# Patient Record
Sex: MALE | Race: WHITE | NOT HISPANIC OR LATINO
[De-identification: names, ages, dates, MRNs, and addresses within clinical notes are randomized per-mention and may not be internally consistent; named-entity substitution may affect disease eponyms.]

---

## 2017-02-23 ENCOUNTER — APPOINTMENT (OUTPATIENT)
Dept: CARDIOLOGY | Facility: CLINIC | Age: 72
End: 2017-02-23

## 2017-02-23 VITALS — BODY MASS INDEX: 28.96 KG/M2 | WEIGHT: 174 LBS

## 2017-02-27 ENCOUNTER — MEDICATION RENEWAL (OUTPATIENT)
Age: 72
End: 2017-02-27

## 2017-03-03 ENCOUNTER — OUTPATIENT (OUTPATIENT)
Dept: OUTPATIENT SERVICES | Facility: HOSPITAL | Age: 72
LOS: 1 days | Discharge: HOME | End: 2017-03-03

## 2017-03-13 ENCOUNTER — MEDICATION RENEWAL (OUTPATIENT)
Age: 72
End: 2017-03-13

## 2017-04-03 ENCOUNTER — APPOINTMENT (OUTPATIENT)
Dept: CARDIOLOGY | Facility: CLINIC | Age: 72
End: 2017-04-03

## 2017-04-03 VITALS — HEIGHT: 65 IN | WEIGHT: 174 LBS | BODY MASS INDEX: 28.99 KG/M2

## 2017-04-03 VITALS — DIASTOLIC BLOOD PRESSURE: 80 MMHG | SYSTOLIC BLOOD PRESSURE: 130 MMHG | HEART RATE: 80 BPM

## 2017-05-01 ENCOUNTER — APPOINTMENT (OUTPATIENT)
Dept: CARDIOLOGY | Facility: CLINIC | Age: 72
End: 2017-05-01

## 2017-06-07 ENCOUNTER — APPOINTMENT (OUTPATIENT)
Dept: OTOLARYNGOLOGY | Facility: CLINIC | Age: 72
End: 2017-06-07

## 2017-06-07 ENCOUNTER — RX RENEWAL (OUTPATIENT)
Age: 72
End: 2017-06-07

## 2017-06-07 VITALS — WEIGHT: 174 LBS | HEIGHT: 65 IN | BODY MASS INDEX: 28.99 KG/M2

## 2017-06-27 DIAGNOSIS — A88.1 EPIDEMIC VERTIGO: ICD-10-CM

## 2017-07-12 ENCOUNTER — APPOINTMENT (OUTPATIENT)
Dept: OTOLARYNGOLOGY | Facility: CLINIC | Age: 72
End: 2017-07-12

## 2017-07-27 ENCOUNTER — APPOINTMENT (OUTPATIENT)
Dept: CARDIOLOGY | Facility: CLINIC | Age: 72
End: 2017-07-27

## 2017-07-27 VITALS — SYSTOLIC BLOOD PRESSURE: 124 MMHG | DIASTOLIC BLOOD PRESSURE: 78 MMHG

## 2017-07-27 VITALS — BODY MASS INDEX: 30.79 KG/M2 | WEIGHT: 185 LBS

## 2017-07-27 RX ORDER — ASPIRIN 81 MG/1
81 TABLET ORAL
Qty: 30 | Refills: 6 | Status: DISCONTINUED | COMMUNITY
Start: 2017-04-03 | End: 2017-07-27

## 2017-07-28 ENCOUNTER — APPOINTMENT (OUTPATIENT)
Dept: OTOLARYNGOLOGY | Facility: CLINIC | Age: 72
End: 2017-07-28
Payer: MEDICARE

## 2017-07-28 VITALS — HEIGHT: 65 IN | BODY MASS INDEX: 30.82 KG/M2 | WEIGHT: 185 LBS

## 2017-07-28 PROCEDURE — 99213 OFFICE O/P EST LOW 20 MIN: CPT

## 2017-08-18 ENCOUNTER — APPOINTMENT (OUTPATIENT)
Dept: OTOLARYNGOLOGY | Facility: CLINIC | Age: 72
End: 2017-08-18
Payer: MEDICARE

## 2017-08-18 VITALS — WEIGHT: 185 LBS | BODY MASS INDEX: 30.82 KG/M2 | HEIGHT: 65 IN

## 2017-08-18 PROCEDURE — 69433 CREATE EARDRUM OPENING: CPT

## 2017-08-18 PROCEDURE — 99213 OFFICE O/P EST LOW 20 MIN: CPT | Mod: 25

## 2017-08-24 ENCOUNTER — APPOINTMENT (OUTPATIENT)
Dept: CARDIOLOGY | Facility: CLINIC | Age: 72
End: 2017-08-24

## 2017-08-24 VITALS — DIASTOLIC BLOOD PRESSURE: 80 MMHG | HEART RATE: 67 BPM | SYSTOLIC BLOOD PRESSURE: 130 MMHG

## 2017-08-24 VITALS — HEIGHT: 65 IN | WEIGHT: 184 LBS | BODY MASS INDEX: 30.66 KG/M2

## 2017-09-22 ENCOUNTER — APPOINTMENT (OUTPATIENT)
Dept: OTOLARYNGOLOGY | Facility: CLINIC | Age: 72
End: 2017-09-22
Payer: MEDICARE

## 2017-09-22 ENCOUNTER — RX RENEWAL (OUTPATIENT)
Age: 72
End: 2017-09-22

## 2017-09-22 VITALS — BODY MASS INDEX: 30.66 KG/M2 | HEIGHT: 65 IN | WEIGHT: 184 LBS

## 2017-09-22 PROCEDURE — 99213 OFFICE O/P EST LOW 20 MIN: CPT

## 2017-10-20 ENCOUNTER — OUTPATIENT (OUTPATIENT)
Dept: OUTPATIENT SERVICES | Facility: HOSPITAL | Age: 72
LOS: 1 days | Discharge: HOME | End: 2017-10-20

## 2017-10-20 DIAGNOSIS — H81.13 BENIGN PAROXYSMAL VERTIGO, BILATERAL: ICD-10-CM

## 2017-12-07 ENCOUNTER — APPOINTMENT (OUTPATIENT)
Dept: CARDIOLOGY | Facility: CLINIC | Age: 72
End: 2017-12-07

## 2017-12-07 VITALS — BODY MASS INDEX: 31.12 KG/M2 | WEIGHT: 187 LBS

## 2017-12-07 VITALS — DIASTOLIC BLOOD PRESSURE: 62 MMHG | SYSTOLIC BLOOD PRESSURE: 140 MMHG

## 2017-12-07 RX ORDER — GLIPIZIDE 10 MG/1
10 TABLET, FILM COATED, EXTENDED RELEASE ORAL
Refills: 0 | Status: DISCONTINUED | COMMUNITY
End: 2017-12-07

## 2017-12-18 ENCOUNTER — APPOINTMENT (OUTPATIENT)
Dept: OTOLARYNGOLOGY | Facility: CLINIC | Age: 72
End: 2017-12-18
Payer: MEDICARE

## 2017-12-18 VITALS — BODY MASS INDEX: 31.16 KG/M2 | HEIGHT: 65 IN | WEIGHT: 187 LBS

## 2017-12-18 PROCEDURE — 99213 OFFICE O/P EST LOW 20 MIN: CPT | Mod: 25

## 2017-12-18 PROCEDURE — 69210 REMOVE IMPACTED EAR WAX UNI: CPT

## 2018-01-16 ENCOUNTER — APPOINTMENT (OUTPATIENT)
Dept: CARDIOLOGY | Facility: CLINIC | Age: 73
End: 2018-01-16

## 2018-01-16 VITALS — BODY MASS INDEX: 31.32 KG/M2 | HEIGHT: 65 IN | WEIGHT: 188 LBS

## 2018-01-16 VITALS — HEART RATE: 82 BPM | SYSTOLIC BLOOD PRESSURE: 130 MMHG | DIASTOLIC BLOOD PRESSURE: 80 MMHG

## 2018-01-16 DIAGNOSIS — H65.92 UNSPECIFIED NONSUPPURATIVE OTITIS MEDIA, LEFT EAR: ICD-10-CM

## 2018-03-12 ENCOUNTER — RX RENEWAL (OUTPATIENT)
Age: 73
End: 2018-03-12

## 2018-04-12 ENCOUNTER — APPOINTMENT (OUTPATIENT)
Dept: CARDIOLOGY | Facility: CLINIC | Age: 73
End: 2018-04-12

## 2018-04-12 VITALS — SYSTOLIC BLOOD PRESSURE: 120 MMHG | DIASTOLIC BLOOD PRESSURE: 70 MMHG | BODY MASS INDEX: 31.62 KG/M2 | WEIGHT: 190 LBS

## 2018-04-12 RX ORDER — PIOGLITAZONE HYDROCHLORIDE 45 MG/1
45 TABLET ORAL
Refills: 0 | Status: COMPLETED | COMMUNITY
Start: 2017-09-11 | End: 2018-04-12

## 2018-04-18 ENCOUNTER — APPOINTMENT (OUTPATIENT)
Dept: OTOLARYNGOLOGY | Facility: CLINIC | Age: 73
End: 2018-04-18
Payer: MEDICARE

## 2018-04-18 VITALS
HEIGHT: 65 IN | WEIGHT: 193 LBS | BODY MASS INDEX: 32.15 KG/M2 | DIASTOLIC BLOOD PRESSURE: 78 MMHG | SYSTOLIC BLOOD PRESSURE: 122 MMHG

## 2018-04-18 DIAGNOSIS — Z87.09 PERSONAL HISTORY OF OTHER DISEASES OF THE RESPIRATORY SYSTEM: ICD-10-CM

## 2018-04-18 PROCEDURE — 31575 DIAGNOSTIC LARYNGOSCOPY: CPT

## 2018-04-18 PROCEDURE — 99213 OFFICE O/P EST LOW 20 MIN: CPT | Mod: 25

## 2018-05-15 ENCOUNTER — APPOINTMENT (OUTPATIENT)
Dept: VASCULAR SURGERY | Facility: CLINIC | Age: 73
End: 2018-05-15
Payer: MEDICARE

## 2018-05-15 VITALS
DIASTOLIC BLOOD PRESSURE: 70 MMHG | HEIGHT: 65 IN | BODY MASS INDEX: 31.99 KG/M2 | WEIGHT: 192 LBS | SYSTOLIC BLOOD PRESSURE: 122 MMHG

## 2018-05-15 PROCEDURE — 99213 OFFICE O/P EST LOW 20 MIN: CPT

## 2018-05-15 PROCEDURE — 93970 EXTREMITY STUDY: CPT

## 2018-05-21 ENCOUNTER — MEDICATION RENEWAL (OUTPATIENT)
Age: 73
End: 2018-05-21

## 2018-05-22 ENCOUNTER — RX RENEWAL (OUTPATIENT)
Age: 73
End: 2018-05-22

## 2018-06-18 ENCOUNTER — APPOINTMENT (OUTPATIENT)
Dept: CARDIOLOGY | Facility: CLINIC | Age: 73
End: 2018-06-18

## 2018-06-18 VITALS
WEIGHT: 199 LBS | DIASTOLIC BLOOD PRESSURE: 70 MMHG | BODY MASS INDEX: 33.15 KG/M2 | HEART RATE: 81 BPM | SYSTOLIC BLOOD PRESSURE: 130 MMHG | HEIGHT: 65 IN

## 2018-07-02 ENCOUNTER — APPOINTMENT (OUTPATIENT)
Dept: CARDIOLOGY | Facility: CLINIC | Age: 73
End: 2018-07-02

## 2018-07-11 ENCOUNTER — OUTPATIENT (OUTPATIENT)
Dept: OUTPATIENT SERVICES | Facility: HOSPITAL | Age: 73
LOS: 1 days | Discharge: HOME | End: 2018-07-11

## 2018-07-11 DIAGNOSIS — I25.10 ATHEROSCLEROTIC HEART DISEASE OF NATIVE CORONARY ARTERY WITHOUT ANGINA PECTORIS: ICD-10-CM

## 2018-10-18 ENCOUNTER — APPOINTMENT (OUTPATIENT)
Dept: CARDIOLOGY | Facility: CLINIC | Age: 73
End: 2018-10-18

## 2018-10-18 VITALS — DIASTOLIC BLOOD PRESSURE: 72 MMHG | SYSTOLIC BLOOD PRESSURE: 132 MMHG

## 2018-10-18 VITALS — BODY MASS INDEX: 33.12 KG/M2 | WEIGHT: 199 LBS

## 2018-10-26 ENCOUNTER — APPOINTMENT (OUTPATIENT)
Dept: CARDIOLOGY | Facility: CLINIC | Age: 73
End: 2018-10-26

## 2018-10-26 VITALS
SYSTOLIC BLOOD PRESSURE: 130 MMHG | DIASTOLIC BLOOD PRESSURE: 76 MMHG | HEART RATE: 80 BPM | BODY MASS INDEX: 32.99 KG/M2 | WEIGHT: 198 LBS | HEIGHT: 65 IN

## 2018-11-30 ENCOUNTER — RX RENEWAL (OUTPATIENT)
Age: 73
End: 2018-11-30

## 2018-12-10 ENCOUNTER — OUTPATIENT (OUTPATIENT)
Dept: OUTPATIENT SERVICES | Facility: HOSPITAL | Age: 73
LOS: 1 days | Discharge: HOME | End: 2018-12-10

## 2018-12-10 DIAGNOSIS — R55 SYNCOPE AND COLLAPSE: ICD-10-CM

## 2018-12-10 LAB — GLUCOSE BLDC GLUCOMTR-MCNC: 138 MG/DL — HIGH (ref 70–99)

## 2018-12-10 NOTE — H&P ADULT - NSHPPHYSICALEXAM_GEN_ALL_CORE
Constitutional: well developed, normal appearance, well groomed, well nourished, no deformities and no acute distress.   Cardiovascular: heart rate and rhythm were normal, normal S1 and S2 and no murmurs present.   Pulmonary: no respiratory distress, normal respiratory rhythm and effort, no accessory muscle use and lungs were clear to auscultation bilaterally.   Skin: The incision was clean, dry and well-healed.   Abdomen: soft, non-tender, no hepato-splenomegaly and no abdominal mass palpated.   Extremities: no clubbing of the fingernails, no localized cyanosis, no petechial hemorrhages and no ischemic changes.

## 2018-12-10 NOTE — H&P ADULT - HISTORY OF PRESENT ILLNESS
Pti s a 74 yo M with hx of DM, 1st degree AV block, HLD, HTN, with loop recorder placed 2 years ago coming to EP lab to have loop recorder removed. Pt has hx of palpitations and dizziness but has not had any episodes in several months. Pt at this time feeling well and asymptomatic. Denies dizziness, syncope, chest pain, leg swelling, fever.

## 2018-12-10 NOTE — H&P ADULT - ATTENDING COMMENTS
Patient with loop recorder for syncope. No events noted. Here for loop explant.    keflex 500 mg po x 1 dose prior to procedure

## 2018-12-10 NOTE — PROGRESS NOTE ADULT - SUBJECTIVE AND OBJECTIVE BOX
Electrophysiology Brief Post-Op Note    I have personally seen and examined the patient.  I agree with the history and physical which I have reviewed and noted any changes below.  12-10-18 @ 08:30 AM    PRE-OP DIAGNOSIS: Syncope    POST-OP DIAGNOSIS: Syncope    PROCEDURE: Implantable Loop Recorder Extraction    Physician: Jaja Childers MD  Assistant: None    ESTIMATED BLOOD LOSS:     5  mL    ANESTHESIA TYPE:  [  ]General Anesthesia  [  ] Sedation  [ X ] Local/Regional    CONDITION  [  ] Critical  [  ] Serious  [  ]Fair  [ X ]Good      SPECIMENS REMOVED (IF APPLICABLE): None    IMPLANTS (IF APPLICABLE): Implantable Loop Recorder ([employer])      FINDINGS  PLAN OF CARE  - Keflex 500 mg PO x 1 dose prior to procedure  - Remove outer dressing tomorrow  - No shower x 2 days. May shower on Thurs.   - Follow up with Dr. Mcelroy in the office in 4 weeks      Jaja Childers MD   Electrophysiology Attending

## 2018-12-10 NOTE — CHART NOTE - NSCHARTNOTEFT_GEN_A_CORE
Cardiac Electrophysiology Procedure Report  Date of procedure	12/10/18  EP Attending	Jaja Childers MD  Procedure	Loop Recorder Removal    Indication: 72 yo M with loop recorder in place for syncope. He is now referred for removal of implantable loop recorder.    Anesthesia: Local    EQUIPMENT EXPLANTED  :     [employer]   Model Number:  [id number]  Serial Number:    [id number]    DESCRIPTION OF PROCEDURE  The patient was brought to the electrophysiology procedure area in a nonsedated state, and received preoperative antibiotics. Informed, written consent was obtained prior to the procedure.  The device was interrogated and no events were noted on the loop recorder. The left anterior chest region was prepped and cleaned from the nipple to the upper left clavicular border with serial applications of Chlorhexidine. Patient was then covered with sterile drapes in the usual manner. Blood pressure, oxygenation, and level of comfort were stable throughout.     Following infiltration with local anesthetic, a 1-cm incision was made along the left sternal border at the fourth intercostal space over the previous scar. The implantable loop recorder was removed from the subcutaneous tissue. The wound was approximated with a 4-0 absorbable suture. Direct pressure was applied to the wound to obtain hemostasis. The wound was approximated using dermabond. Steristrips and a dry, sterile dressing were placed over the wound.     The patient tolerated the procedure well.    COMPLICATIONS  There were no immediate complications.    CONCLUSIONS  Successful removal of a loop recorder.    EBL: 5 cc  _______________________________  Jaja Childers MD  Cardiac Electrophysiology

## 2018-12-14 DIAGNOSIS — Z45.09 ENCOUNTER FOR ADJUSTMENT AND MANAGEMENT OF OTHER CARDIAC DEVICE: ICD-10-CM

## 2019-01-10 ENCOUNTER — APPOINTMENT (OUTPATIENT)
Dept: CARDIOLOGY | Facility: CLINIC | Age: 74
End: 2019-01-10

## 2019-01-10 VITALS — BODY MASS INDEX: 33.28 KG/M2 | DIASTOLIC BLOOD PRESSURE: 70 MMHG | SYSTOLIC BLOOD PRESSURE: 138 MMHG | WEIGHT: 200 LBS

## 2019-01-10 NOTE — ASSESSMENT
[FreeTextEntry1] : dizziness - likely not related to rhythm. It is most likely multifactorial anmeia, debates\par No significant arrhythmic cause for patient dizziness. \par - symptoms improved  continue meds\par -  removing loop. risk and benfits expaliend

## 2019-01-10 NOTE — HISTORY OF PRESENT ILLNESS
[Syncope] : no syncope [Dizziness] : no dizziness [Erythema at Site] : no erythema at device site [Swelling at Site] : no swelling at device site [de-identified] : Patient c/o no further episodes after seeing ENT.  Loop had no episodes

## 2019-01-10 NOTE — PHYSICAL EXAM
[General Appearance - Well Developed] : well developed [Normal Appearance] : normal appearance [Well Groomed] : well groomed [General Appearance - Well Nourished] : well nourished [No Deformities] : no deformities [General Appearance - In No Acute Distress] : no acute distress [Respiration, Rhythm And Depth] : normal respiratory rhythm and effort [Exaggerated Use Of Accessory Muscles For Inspiration] : no accessory muscle use [Auscultation Breath Sounds / Voice Sounds] : lungs were clear to auscultation bilaterally [Heart Rate And Rhythm] : heart rate and rhythm were normal [Heart Sounds] : normal S1 and S2 [Murmurs] : no murmurs present [Abdomen Soft] : soft [Abdomen Tenderness] : non-tender [Abdomen Mass (___ Cm)] : no abdominal mass palpated [Nail Clubbing] : no clubbing of the fingernails [Cyanosis, Localized] : no localized cyanosis [Petechial Hemorrhages (___cm)] : no petechial hemorrhages [] : no ischemic changes [Clean] : clean [Dry] : dry [Well-Healed] : well-healed

## 2019-01-15 ENCOUNTER — APPOINTMENT (OUTPATIENT)
Dept: CARDIOLOGY | Facility: CLINIC | Age: 74
End: 2019-01-15

## 2019-02-22 ENCOUNTER — APPOINTMENT (OUTPATIENT)
Dept: CARDIOLOGY | Facility: CLINIC | Age: 74
End: 2019-02-22

## 2019-02-22 VITALS
DIASTOLIC BLOOD PRESSURE: 64 MMHG | SYSTOLIC BLOOD PRESSURE: 130 MMHG | HEART RATE: 80 BPM | WEIGHT: 196 LBS | HEIGHT: 65 IN | BODY MASS INDEX: 32.65 KG/M2

## 2019-02-22 NOTE — DISCUSSION/SUMMARY
[___ Month(s)] : [unfilled] month(s) [FreeTextEntry1] : The patient refuses cath . His had a creat of 1.38 and only mild ischemia on nuclear stress test with no symptoms. Will continue medical therapy . If symptomatic will discuss cath again.

## 2019-02-22 NOTE — HISTORY OF PRESENT ILLNESS
[FreeTextEntry1] : The patient has had 1 episode of dizziness after having the ILM extracted. . No chest pain No SOB . He had refused cath .

## 2019-02-22 NOTE — PHYSICAL EXAM
[General Appearance - Well Developed] : well developed [Normal Appearance] : normal appearance [Well Groomed] : well groomed [General Appearance - Well Nourished] : well nourished [No Deformities] : no deformities [General Appearance - In No Acute Distress] : no acute distress [Normal Conjunctiva] : the conjunctiva exhibited no abnormalities [Eyelids - No Xanthelasma] : the eyelids demonstrated no xanthelasmas [Normal Oral Mucosa] : normal oral mucosa [No Oral Pallor] : no oral pallor [No Oral Cyanosis] : no oral cyanosis [Respiration, Rhythm And Depth] : normal respiratory rhythm and effort [Exaggerated Use Of Accessory Muscles For Inspiration] : no accessory muscle use [Auscultation Breath Sounds / Voice Sounds] : lungs were clear to auscultation bilaterally [Heart Rate And Rhythm] : heart rate and rhythm were normal [Heart Sounds] : normal S1 and S2 [Murmurs] : no murmurs present [Abdomen Soft] : soft [Abdomen Tenderness] : non-tender [Abdomen Mass (___ Cm)] : no abdominal mass palpated [Abnormal Walk] : normal gait [Nail Clubbing] : no clubbing of the fingernails [Cyanosis, Localized] : no localized cyanosis [Petechial Hemorrhages (___cm)] : no petechial hemorrhages [] : no ischemic changes [Oriented To Time, Place, And Person] : oriented to person, place, and time [Affect] : the affect was normal [Mood] : the mood was normal [No Anxiety] : not feeling anxious [FreeTextEntry1] : No JVD

## 2019-02-22 NOTE — REASON FOR VISIT
[Follow-Up - Clinic] : a clinic follow-up of [Coronary Artery Disease] : coronary artery disease [Hyperlipidemia] : hyperlipidemia [Hypertension] : hypertension [Medication Management] : Medication management [FreeTextEntry1] : dizziness

## 2019-02-22 NOTE — ASSESSMENT
[FreeTextEntry1] : The patient has not had chest pain . Some LONG . Nuclear stress test shows moderate area of mild ischemia inferior wall. In view of known POBA of LAD with acute MI more than 20 years ago and some LONG ( which is multifactorial). The patient had refused cardiac cath . He has been feeling well on medical therapy.

## 2019-03-04 ENCOUNTER — TRANSCRIPTION ENCOUNTER (OUTPATIENT)
Age: 74
End: 2019-03-04

## 2019-03-05 ENCOUNTER — OUTPATIENT (OUTPATIENT)
Dept: OUTPATIENT SERVICES | Facility: HOSPITAL | Age: 74
LOS: 1 days | Discharge: HOME | End: 2019-03-05

## 2019-03-05 DIAGNOSIS — R10.9 UNSPECIFIED ABDOMINAL PAIN: ICD-10-CM

## 2019-03-28 ENCOUNTER — APPOINTMENT (OUTPATIENT)
Dept: CARDIOLOGY | Facility: CLINIC | Age: 74
End: 2019-03-28

## 2019-05-14 ENCOUNTER — APPOINTMENT (OUTPATIENT)
Dept: CARDIOLOGY | Facility: CLINIC | Age: 74
End: 2019-05-14
Payer: MEDICARE

## 2019-05-14 VITALS
DIASTOLIC BLOOD PRESSURE: 76 MMHG | HEART RATE: 78 BPM | HEIGHT: 65 IN | WEIGHT: 198 LBS | BODY MASS INDEX: 32.99 KG/M2 | SYSTOLIC BLOOD PRESSURE: 140 MMHG

## 2019-05-14 PROCEDURE — 99214 OFFICE O/P EST MOD 30 MIN: CPT

## 2019-05-14 PROCEDURE — 93000 ELECTROCARDIOGRAM COMPLETE: CPT

## 2019-05-14 NOTE — HISTORY OF PRESENT ILLNESS
[FreeTextEntry1] : The patient has had no further dizziness. No chest pain. .  The patient has known chronic anemia . He has had a full GI workup and is under the care of Dr. Rios.

## 2019-05-14 NOTE — PHYSICAL EXAM
[General Appearance - Well Developed] : well developed [General Appearance - Well Nourished] : well nourished [Normal Appearance] : normal appearance [Well Groomed] : well groomed [General Appearance - In No Acute Distress] : no acute distress [No Deformities] : no deformities [Normal Conjunctiva] : the conjunctiva exhibited no abnormalities [Eyelids - No Xanthelasma] : the eyelids demonstrated no xanthelasmas [No Oral Cyanosis] : no oral cyanosis [Normal Oral Mucosa] : normal oral mucosa [No Oral Pallor] : no oral pallor [Auscultation Breath Sounds / Voice Sounds] : lungs were clear to auscultation bilaterally [Exaggerated Use Of Accessory Muscles For Inspiration] : no accessory muscle use [Heart Rate And Rhythm] : heart rate and rhythm were normal [Respiration, Rhythm And Depth] : normal respiratory rhythm and effort [Heart Sounds] : normal S1 and S2 [Murmurs] : no murmurs present [Abdomen Tenderness] : non-tender [Abdomen Soft] : soft [Abdomen Mass (___ Cm)] : no abdominal mass palpated [Abnormal Walk] : normal gait [Nail Clubbing] : no clubbing of the fingernails [Cyanosis, Localized] : no localized cyanosis [Petechial Hemorrhages (___cm)] : no petechial hemorrhages [] : no ischemic changes [Oriented To Time, Place, And Person] : oriented to person, place, and time [Affect] : the affect was normal [Mood] : the mood was normal [No Anxiety] : not feeling anxious [FreeTextEntry1] : No JVD

## 2019-05-14 NOTE — ASSESSMENT
[FreeTextEntry1] : The patient has had no chest pain . He had a know fatty liver . HAd had CAD . LDL is closed to goal . . He had an abnormal Nuclear stress test last year . Discussed cath at the time but he had refused . He has been stable on medical therapy .

## 2019-05-24 ENCOUNTER — APPOINTMENT (OUTPATIENT)
Dept: OTOLARYNGOLOGY | Facility: CLINIC | Age: 74
End: 2019-05-24
Payer: MEDICARE

## 2019-05-24 DIAGNOSIS — J31.0 CHRONIC RHINITIS: ICD-10-CM

## 2019-05-24 PROCEDURE — 99214 OFFICE O/P EST MOD 30 MIN: CPT | Mod: 25

## 2019-05-24 PROCEDURE — 92550 TYMPANOMETRY & REFLEX THRESH: CPT

## 2019-05-24 PROCEDURE — 31231 NASAL ENDOSCOPY DX: CPT

## 2019-05-24 PROCEDURE — 92557 COMPREHENSIVE HEARING TEST: CPT

## 2019-05-24 NOTE — HISTORY OF PRESENT ILLNESS
[FreeTextEntry1] : Patient following up on rhinitis and left otalgia. Patient admits otalgia has been going on for a few weeks. No hearing loss. No tinnitus.\par Patient also c/o rhinitis for a few weeks. Patient has been using Flonase with no improvement.  He complains pf nasal dripping , possibly right more than left. No anosmia.

## 2019-05-24 NOTE — ASSESSMENT
[FreeTextEntry1] : Add zyrtec-D.\par Audiogram reviewed today with patient and his daughter.\par R/o CSF leak

## 2019-06-08 ENCOUNTER — OUTPATIENT (OUTPATIENT)
Dept: OUTPATIENT SERVICES | Facility: HOSPITAL | Age: 74
LOS: 1 days | Discharge: HOME | End: 2019-06-08
Payer: MEDICARE

## 2019-06-08 DIAGNOSIS — J34.89 OTHER SPECIFIED DISORDERS OF NOSE AND NASAL SINUSES: ICD-10-CM

## 2019-06-08 PROCEDURE — 70486 CT MAXILLOFACIAL W/O DYE: CPT | Mod: 26

## 2019-06-12 ENCOUNTER — APPOINTMENT (OUTPATIENT)
Dept: OTOLARYNGOLOGY | Facility: CLINIC | Age: 74
End: 2019-06-12
Payer: MEDICARE

## 2019-06-12 VITALS
DIASTOLIC BLOOD PRESSURE: 85 MMHG | HEIGHT: 65 IN | WEIGHT: 198 LBS | SYSTOLIC BLOOD PRESSURE: 132 MMHG | BODY MASS INDEX: 32.99 KG/M2

## 2019-06-12 PROCEDURE — 99214 OFFICE O/P EST MOD 30 MIN: CPT

## 2019-06-12 NOTE — HISTORY OF PRESENT ILLNESS
[de-identified] : Pt returns to the office to evaluate atrophic rhinitis and rhinorrhea. Pt completed CT scan of the sinuses; results are inconclusive regarding a skull base defect. Patient has a history of head trauma during childhood, he has been having a unilateral clear fluid rhinorrhea. \par CT images were reviewed in detail.

## 2019-06-12 NOTE — REASON FOR VISIT
[Subsequent Evaluation] : a subsequent evaluation for [FreeTextEntry2] : atrophic rhinitis, rhinorrhea

## 2019-07-11 ENCOUNTER — APPOINTMENT (OUTPATIENT)
Dept: CARDIOLOGY | Facility: CLINIC | Age: 74
End: 2019-07-11

## 2019-07-29 ENCOUNTER — APPOINTMENT (OUTPATIENT)
Dept: CARDIOLOGY | Facility: CLINIC | Age: 74
End: 2019-07-29

## 2019-09-17 ENCOUNTER — APPOINTMENT (OUTPATIENT)
Dept: CARDIOLOGY | Facility: CLINIC | Age: 74
End: 2019-09-17
Payer: MEDICARE

## 2019-09-17 VITALS
DIASTOLIC BLOOD PRESSURE: 80 MMHG | SYSTOLIC BLOOD PRESSURE: 140 MMHG | WEIGHT: 194 LBS | HEART RATE: 78 BPM | HEIGHT: 65 IN | BODY MASS INDEX: 32.32 KG/M2

## 2019-09-17 PROCEDURE — 93000 ELECTROCARDIOGRAM COMPLETE: CPT

## 2019-09-17 PROCEDURE — 99214 OFFICE O/P EST MOD 30 MIN: CPT

## 2019-09-17 NOTE — PHYSICAL EXAM
[General Appearance - Well Developed] : well developed [Normal Appearance] : normal appearance [Well Groomed] : well groomed [General Appearance - Well Nourished] : well nourished [No Deformities] : no deformities [General Appearance - In No Acute Distress] : no acute distress [Normal Conjunctiva] : the conjunctiva exhibited no abnormalities [Normal Oral Mucosa] : normal oral mucosa [Eyelids - No Xanthelasma] : the eyelids demonstrated no xanthelasmas [No Oral Pallor] : no oral pallor [No Oral Cyanosis] : no oral cyanosis [Respiration, Rhythm And Depth] : normal respiratory rhythm and effort [Exaggerated Use Of Accessory Muscles For Inspiration] : no accessory muscle use [Auscultation Breath Sounds / Voice Sounds] : lungs were clear to auscultation bilaterally [Heart Rate And Rhythm] : heart rate and rhythm were normal [Heart Sounds] : normal S1 and S2 [Murmurs] : no murmurs present [Abdomen Soft] : soft [Abdomen Tenderness] : non-tender [Abdomen Mass (___ Cm)] : no abdominal mass palpated [Abnormal Walk] : normal gait [Nail Clubbing] : no clubbing of the fingernails [Cyanosis, Localized] : no localized cyanosis [Petechial Hemorrhages (___cm)] : no petechial hemorrhages [] : no ischemic changes [Oriented To Time, Place, And Person] : oriented to person, place, and time [No Anxiety] : not feeling anxious [Affect] : the affect was normal [Mood] : the mood was normal [FreeTextEntry1] : No JVD

## 2019-09-17 NOTE — ASSESSMENT
[FreeTextEntry1] : The patient remains stable at this timeo n medical therapy . He did have an abnormal nuclear stress test last year . He was told of the need for cardiac cath but he has refused .ECG is unchanged and he has had no chest pain . The pateint has had a remote MI followed by [facility] ., He had an abnormal nuclear stress test last year and was told of the need for cath at that time. . He had refused . . Mikey continue medical therapy . He has had a ILM in the past for dizziness . This had been removed. No arrhythmias.

## 2019-09-17 NOTE — HISTORY OF PRESENT ILLNESS
[FreeTextEntry1] : The patient has had no further dizziness. No chest pain . . The patient had been told in the past that he needs a cardiac cath but he has refused. The patient has had a POBA aftr MI in th 1990's . He had an abnormal nuclear stress test last year .

## 2019-09-18 ENCOUNTER — OTHER (OUTPATIENT)
Age: 74
End: 2019-09-18

## 2019-10-07 ENCOUNTER — APPOINTMENT (OUTPATIENT)
Dept: CARDIOLOGY | Facility: CLINIC | Age: 74
End: 2019-10-07
Payer: MEDICARE

## 2019-10-07 PROCEDURE — 93880 EXTRACRANIAL BILAT STUDY: CPT

## 2019-10-07 PROCEDURE — 93306 TTE W/DOPPLER COMPLETE: CPT

## 2020-02-10 ENCOUNTER — APPOINTMENT (OUTPATIENT)
Dept: CARDIOLOGY | Facility: CLINIC | Age: 75
End: 2020-02-10
Payer: MEDICARE

## 2020-02-10 VITALS
BODY MASS INDEX: 33.15 KG/M2 | HEIGHT: 65 IN | HEART RATE: 79 BPM | DIASTOLIC BLOOD PRESSURE: 70 MMHG | WEIGHT: 199 LBS | SYSTOLIC BLOOD PRESSURE: 144 MMHG

## 2020-02-10 VITALS — SYSTOLIC BLOOD PRESSURE: 130 MMHG | DIASTOLIC BLOOD PRESSURE: 70 MMHG

## 2020-02-10 PROCEDURE — 99214 OFFICE O/P EST MOD 30 MIN: CPT

## 2020-02-10 PROCEDURE — 93000 ELECTROCARDIOGRAM COMPLETE: CPT

## 2020-02-10 NOTE — PHYSICAL EXAM
[General Appearance - Well Developed] : well developed [Normal Appearance] : normal appearance [General Appearance - Well Nourished] : well nourished [Well Groomed] : well groomed [Normal Conjunctiva] : the conjunctiva exhibited no abnormalities [No Deformities] : no deformities [General Appearance - In No Acute Distress] : no acute distress [Eyelids - No Xanthelasma] : the eyelids demonstrated no xanthelasmas [No Oral Pallor] : no oral pallor [Normal Oral Mucosa] : normal oral mucosa [No Oral Cyanosis] : no oral cyanosis [Respiration, Rhythm And Depth] : normal respiratory rhythm and effort [Exaggerated Use Of Accessory Muscles For Inspiration] : no accessory muscle use [Heart Sounds] : normal S1 and S2 [Murmurs] : no murmurs present [Auscultation Breath Sounds / Voice Sounds] : lungs were clear to auscultation bilaterally [Heart Rate And Rhythm] : heart rate and rhythm were normal [Abdomen Tenderness] : non-tender [Abdomen Soft] : soft [Abdomen Mass (___ Cm)] : no abdominal mass palpated [Abnormal Walk] : normal gait [Nail Clubbing] : no clubbing of the fingernails [Petechial Hemorrhages (___cm)] : no petechial hemorrhages [Cyanosis, Localized] : no localized cyanosis [] : no ischemic changes [Affect] : the affect was normal [Oriented To Time, Place, And Person] : oriented to person, place, and time [No Anxiety] : not feeling anxious [Mood] : the mood was normal [FreeTextEntry1] : No JVD

## 2020-02-10 NOTE — HISTORY OF PRESENT ILLNESS
[FreeTextEntry1] : The patient has been feeling well. No chest pain . Known past MI and POBA in 1990 . No events since that time. He had an abnormal nuclear stress test but had refused cath .  .

## 2020-02-10 NOTE — ASSESSMENT
[FreeTextEntry1] : The patient ha known CAD which is suspected to have progressed . He h does have a remote POBA . He had inferior ischemia on last nuclear stress test but had refused cath .

## 2020-03-11 ENCOUNTER — FORM ENCOUNTER (OUTPATIENT)
Age: 75
End: 2020-03-11

## 2020-03-12 ENCOUNTER — OUTPATIENT (OUTPATIENT)
Dept: OUTPATIENT SERVICES | Facility: HOSPITAL | Age: 75
LOS: 1 days | Discharge: HOME | End: 2020-03-12
Payer: MEDICARE

## 2020-03-12 DIAGNOSIS — I10 ESSENTIAL (PRIMARY) HYPERTENSION: ICD-10-CM

## 2020-03-12 DIAGNOSIS — E11.9 TYPE 2 DIABETES MELLITUS WITHOUT COMPLICATIONS: ICD-10-CM

## 2020-03-12 DIAGNOSIS — I21.9 ACUTE MYOCARDIAL INFARCTION, UNSPECIFIED: ICD-10-CM

## 2020-03-12 PROCEDURE — 78452 HT MUSCLE IMAGE SPECT MULT: CPT | Mod: 26

## 2020-04-17 ENCOUNTER — APPOINTMENT (OUTPATIENT)
Dept: CARDIOLOGY | Facility: CLINIC | Age: 75
End: 2020-04-17
Payer: MEDICARE

## 2020-04-17 VITALS — SYSTOLIC BLOOD PRESSURE: 140 MMHG | DIASTOLIC BLOOD PRESSURE: 70 MMHG

## 2020-04-17 VITALS — HEART RATE: 108 BPM

## 2020-04-17 PROCEDURE — 93000 ELECTROCARDIOGRAM COMPLETE: CPT

## 2020-04-17 PROCEDURE — 93306 TTE W/DOPPLER COMPLETE: CPT

## 2020-04-17 PROCEDURE — 99214 OFFICE O/P EST MOD 30 MIN: CPT | Mod: 95

## 2020-04-17 NOTE — HISTORY OF PRESENT ILLNESS
[Home] : at home, [unfilled] , at the time of the visit. [Other Location: e.g. Home (Enter Location, City,State)___] : at [unfilled] [Patient] : the patient [FreeTextEntry2] : and daughter Anny [FreeTextEntry1] : The patient had a fever 2 weeks ago. No cough . The patient had a fever for about a week. The fever had peaked to 101.2 then low grade fever . Now improved but fatigued . She had noted that his HR was increased up to 120/min by pulse ox. His 02 sat was 94% to 96% . During the time his 02 sat was decreased to the low 90's . ECG done today showed an atrial tachycardia  vs sinus tachycardia .

## 2020-04-17 NOTE — ASSESSMENT
[FreeTextEntry1] : The patient has had a tachycardia of uncertain etiology . Possible AT . He had possible Covid 2 weeks ago but was never tested . Nobody else was sick in the house . He does usually have a first degree AV block and an IVCD . Coreg was stopped for that reason . He does have inferior ischemia on nuclear stress test and has refused stress testing .  This is an issue with his tachycardia . His anemia is improved . Still waiting for his TFT's . He did have a low sat while febrile and now improved. I have discussed ER where they can get a CT scan of the chest urgenty but his daughter refuses . Do not want to give beta blockers because of periods of ? bradycardia as well noted on pulse ox . Echo was suggestive of normal LV systolic function by visual estimation RV appeared noraml.

## 2020-04-20 ENCOUNTER — OUTPATIENT (OUTPATIENT)
Dept: OUTPATIENT SERVICES | Facility: HOSPITAL | Age: 75
LOS: 1 days | Discharge: HOME | End: 2020-04-20
Payer: MEDICARE

## 2020-04-20 ENCOUNTER — RESULT REVIEW (OUTPATIENT)
Age: 75
End: 2020-04-20

## 2020-04-20 DIAGNOSIS — E11.9 TYPE 2 DIABETES MELLITUS WITHOUT COMPLICATIONS: ICD-10-CM

## 2020-04-20 DIAGNOSIS — R00.0 TACHYCARDIA, UNSPECIFIED: ICD-10-CM

## 2020-04-20 LAB
ALBUMIN SERPL ELPH-MCNC: 3.9 G/DL
ALP BLD-CCNC: 38 U/L
ALT SERPL-CCNC: 20 U/L
ANION GAP SERPL CALC-SCNC: 19 MMOL/L
AST SERPL-CCNC: 22 U/L
BASOPHILS # BLD AUTO: 0.02 K/UL
BASOPHILS NFR BLD AUTO: 0.2 %
BILIRUB SERPL-MCNC: 0.5 MG/DL
BUN SERPL-MCNC: 16 MG/DL
CALCIUM SERPL-MCNC: 9.9 MG/DL
CHLORIDE SERPL-SCNC: 95 MMOL/L
CO2 SERPL-SCNC: 22 MMOL/L
CREAT SERPL-MCNC: 1.2 MG/DL
EOSINOPHIL # BLD AUTO: 0.04 K/UL
EOSINOPHIL NFR BLD AUTO: 0.4 %
GLUCOSE SERPL-MCNC: 250 MG/DL
HCT VFR BLD CALC: 36 %
HGB BLD-MCNC: 11.2 G/DL
IMM GRANULOCYTES NFR BLD AUTO: 0.8 %
LYMPHOCYTES # BLD AUTO: 0.75 K/UL
LYMPHOCYTES NFR BLD AUTO: 8.2 %
MAN DIFF?: NORMAL
MCHC RBC-ENTMCNC: 28.1 PG
MCHC RBC-ENTMCNC: 31.1 G/DL
MCV RBC AUTO: 90.2 FL
MONOCYTES # BLD AUTO: 0.62 K/UL
MONOCYTES NFR BLD AUTO: 6.7 %
NEUTROPHILS # BLD AUTO: 7.7 K/UL
NEUTROPHILS NFR BLD AUTO: 83.7 %
PLATELET # BLD AUTO: 333 K/UL
POTASSIUM SERPL-SCNC: 4.8 MMOL/L
PROT SERPL-MCNC: 7.4 G/DL
RBC # BLD: 3.99 M/UL
RBC # FLD: 14.1 %
SODIUM SERPL-SCNC: 136 MMOL/L
T4 FREE SERPL-MCNC: 1.4 NG/DL
TSH SERPL-ACNC: 1 UIU/ML
WBC # FLD AUTO: 9.2 K/UL

## 2020-04-20 PROCEDURE — 71260 CT THORAX DX C+: CPT | Mod: 26

## 2020-06-17 ENCOUNTER — APPOINTMENT (OUTPATIENT)
Dept: CARDIOLOGY | Facility: CLINIC | Age: 75
End: 2020-06-17
Payer: MEDICARE

## 2020-06-17 PROCEDURE — 93228 REMOTE 30 DAY ECG REV/REPORT: CPT

## 2020-06-23 ENCOUNTER — APPOINTMENT (OUTPATIENT)
Dept: CARDIOLOGY | Facility: CLINIC | Age: 75
End: 2020-06-23
Payer: MEDICARE

## 2020-06-23 ENCOUNTER — APPOINTMENT (OUTPATIENT)
Dept: CARDIOLOGY | Facility: CLINIC | Age: 75
End: 2020-06-23

## 2020-06-23 VITALS
WEIGHT: 189 LBS | SYSTOLIC BLOOD PRESSURE: 110 MMHG | HEIGHT: 65 IN | TEMPERATURE: 97.6 F | HEART RATE: 82 BPM | DIASTOLIC BLOOD PRESSURE: 60 MMHG | BODY MASS INDEX: 31.49 KG/M2

## 2020-06-23 DIAGNOSIS — D64.9 ANEMIA, UNSPECIFIED: ICD-10-CM

## 2020-06-23 PROCEDURE — 99214 OFFICE O/P EST MOD 30 MIN: CPT

## 2020-06-23 PROCEDURE — 93000 ELECTROCARDIOGRAM COMPLETE: CPT

## 2020-06-23 NOTE — HISTORY OF PRESENT ILLNESS
[Home] : at home, [unfilled] , at the time of the visit. [Other Location: e.g. Home (Enter Location, City,State)___] : at [unfilled] [Patient] : the patient [FreeTextEntry1] : The patient had a CTA of the chest which showed no PE but had findings c/w Covid relate PNA . The patient had been followed by his pulmonologist . The patient has had no SOB or chest pain Echo showed EF of 55% Mild concentric LVH IVCD conduction of septum mild AI Mild MR mild TR ./ . 02 st today is 98% HR is back to his baseline . [FreeTextEntry2] : and daughter Anny

## 2020-06-23 NOTE — ASSESSMENT
[FreeTextEntry1] : The patient has had a suspected Covid 19 infection with lung findings c/w Covid related PNA . This has resolved clinically and the patient had told me he was told that a follow up CXR shoed that it has resolved. The patient has not had chest pain or SOB . He does have inferior ischemia on nuclear stress test but he has refused cath in the past . LV systolic function is normal. His tachycardia has resolved . 02 sat is 98% . The patient has  PSVT at 138/min which is posible AT vs AFL . No AF noted on long term event monitor . He didhave PVC's and NS VT . The patient should have cath however refused that . Cannot be on beta blockers bedcause of first degree AV block and IVCD

## 2020-06-23 NOTE — REASON FOR VISIT
[Follow-Up - Clinic] : a clinic follow-up of [Coronary Artery Disease] : coronary artery disease [Hypertension] : hypertension [Hyperlipidemia] : hyperlipidemia [Medication Management] : Medication management [FreeTextEntry1] : dizziness

## 2020-06-26 ENCOUNTER — APPOINTMENT (OUTPATIENT)
Dept: CARDIOLOGY | Facility: CLINIC | Age: 75
End: 2020-06-26
Payer: MEDICARE

## 2020-06-26 VITALS
SYSTOLIC BLOOD PRESSURE: 112 MMHG | HEIGHT: 65 IN | TEMPERATURE: 97.9 F | BODY MASS INDEX: 30.99 KG/M2 | HEART RATE: 84 BPM | DIASTOLIC BLOOD PRESSURE: 72 MMHG | WEIGHT: 186 LBS

## 2020-06-26 PROCEDURE — 99214 OFFICE O/P EST MOD 30 MIN: CPT

## 2020-06-26 NOTE — PHYSICAL EXAM
[General Appearance - Well Developed] : well developed [Well Groomed] : well groomed [Normal Appearance] : normal appearance [General Appearance - Well Nourished] : well nourished [No Deformities] : no deformities [General Appearance - In No Acute Distress] : no acute distress [Exaggerated Use Of Accessory Muscles For Inspiration] : no accessory muscle use [Respiration, Rhythm And Depth] : normal respiratory rhythm and effort [Auscultation Breath Sounds / Voice Sounds] : lungs were clear to auscultation bilaterally [Heart Sounds] : normal S1 and S2 [Heart Rate And Rhythm] : heart rate and rhythm were normal [Abdomen Soft] : soft [Murmurs] : no murmurs present [Abdomen Tenderness] : non-tender [Abdomen Mass (___ Cm)] : no abdominal mass palpated [Nail Clubbing] : no clubbing of the fingernails [] : no ischemic changes [Petechial Hemorrhages (___cm)] : no petechial hemorrhages [Cyanosis, Localized] : no localized cyanosis [Clean] : clean [Dry] : dry [Well-Healed] : well-healed

## 2020-06-29 NOTE — HISTORY OF PRESENT ILLNESS
[Syncope] : no syncope [FreeTextEntry1] : \par \par EKG SR 84 bpm 1st degre AV block RBBB [Erythema at Site] : no erythema at device site [Swelling at Site] : no swelling at device site [Dizziness] : no dizziness

## 2020-06-29 NOTE — ASSESSMENT
[FreeTextEntry1] : dizziness - likely not related to rhythm. It is most likely multifactorial anmeia, debates\par No significant arrhythmic cause for patient dizziness. \par - symptoms improved  continue meds\par -  removing loop. risk and befits explained

## 2020-10-08 ENCOUNTER — APPOINTMENT (OUTPATIENT)
Dept: CARDIOLOGY | Facility: CLINIC | Age: 75
End: 2020-10-08
Payer: MEDICARE

## 2020-10-08 VITALS
SYSTOLIC BLOOD PRESSURE: 128 MMHG | WEIGHT: 197 LBS | DIASTOLIC BLOOD PRESSURE: 70 MMHG | HEART RATE: 78 BPM | TEMPERATURE: 97.4 F | HEIGHT: 65 IN | BODY MASS INDEX: 32.82 KG/M2

## 2020-10-08 PROCEDURE — 93000 ELECTROCARDIOGRAM COMPLETE: CPT

## 2020-10-08 PROCEDURE — 99214 OFFICE O/P EST MOD 30 MIN: CPT

## 2020-10-08 NOTE — HISTORY OF PRESENT ILLNESS
[FreeTextEntry1] : The patient has been feeling well. His Covid infection has resolved. No chest pain or SOB . The patient has been feeling well. No SOB . The patient has persisent anemia and is being followed by Dr. Rios .

## 2020-10-08 NOTE — ASSESSMENT
[FreeTextEntry1] : The patient has seen Dr. Donato.  He has had no further tachycardia . He has had an abnormal nuclear stress test which showeed the same degree of interior wall ischemia . He has refuysed cath and is currently stable on medical therapy  LDL is near goal . BP is accpetable. The patient was not though to have AF . During ciovid and tachycardia he at AT vs AFL . Seen by EP

## 2020-10-16 ENCOUNTER — TRANSCRIPTION ENCOUNTER (OUTPATIENT)
Age: 75
End: 2020-10-16

## 2020-12-16 PROBLEM — Z87.09 HISTORY OF SORE THROAT: Status: RESOLVED | Noted: 2018-04-18 | Resolved: 2020-12-16

## 2021-01-16 ENCOUNTER — TRANSCRIPTION ENCOUNTER (OUTPATIENT)
Age: 76
End: 2021-01-16

## 2021-02-23 ENCOUNTER — APPOINTMENT (OUTPATIENT)
Dept: CARDIOLOGY | Facility: CLINIC | Age: 76
End: 2021-02-23
Payer: MEDICARE

## 2021-02-23 VITALS
BODY MASS INDEX: 30.32 KG/M2 | HEIGHT: 65 IN | DIASTOLIC BLOOD PRESSURE: 76 MMHG | SYSTOLIC BLOOD PRESSURE: 126 MMHG | TEMPERATURE: 96.8 F | HEART RATE: 88 BPM | WEIGHT: 182 LBS

## 2021-02-23 PROCEDURE — 99072 ADDL SUPL MATRL&STAF TM PHE: CPT

## 2021-02-23 PROCEDURE — 93000 ELECTROCARDIOGRAM COMPLETE: CPT

## 2021-02-23 PROCEDURE — 99214 OFFICE O/P EST MOD 30 MIN: CPT

## 2021-02-23 NOTE — HISTORY OF PRESENT ILLNESS
[FreeTextEntry1] : The patient had fallen and fractured his right wrist .  He had a mechanical fall. No chest pain

## 2021-02-23 NOTE — ASSESSMENT
[FreeTextEntry1] : The patient had fallen and fractured his right wrist . The patient has had no chest pain or SOB . He has multifocal PVC's on his resting ECG . ILM has been removed . He has an abnormal nuclear stress test which showed moderate area of inferior ischemia . He has refused cath on multiple occasions and I have brought this up in front of his wife today

## 2021-02-23 NOTE — PHYSICAL EXAM
[General Appearance - Well Developed] : well developed [Normal Appearance] : normal appearance [Well Groomed] : well groomed [General Appearance - Well Nourished] : well nourished [No Deformities] : no deformities [General Appearance - In No Acute Distress] : no acute distress [Normal Conjunctiva] : the conjunctiva exhibited no abnormalities [Eyelids - No Xanthelasma] : the eyelids demonstrated no xanthelasmas [Normal Oral Mucosa] : normal oral mucosa [No Oral Pallor] : no oral pallor [No Oral Cyanosis] : no oral cyanosis [Exaggerated Use Of Accessory Muscles For Inspiration] : no accessory muscle use [Respiration, Rhythm And Depth] : normal respiratory rhythm and effort [Auscultation Breath Sounds / Voice Sounds] : lungs were clear to auscultation bilaterally [Heart Rate And Rhythm] : heart rate and rhythm were normal [Heart Sounds] : normal S1 and S2 [Murmurs] : no murmurs present [Abdomen Soft] : soft [Abdomen Tenderness] : non-tender [Abdomen Mass (___ Cm)] : no abdominal mass palpated [Abnormal Walk] : normal gait [Nail Clubbing] : no clubbing of the fingernails [Cyanosis, Localized] : no localized cyanosis [Petechial Hemorrhages (___cm)] : no petechial hemorrhages [] : no ischemic changes [Oriented To Time, Place, And Person] : oriented to person, place, and time [Affect] : the affect was normal [Mood] : the mood was normal [No Anxiety] : not feeling anxious [FreeTextEntry1] : No JVD

## 2021-02-26 ENCOUNTER — OUTPATIENT (OUTPATIENT)
Dept: OUTPATIENT SERVICES | Facility: HOSPITAL | Age: 76
LOS: 1 days | Discharge: HOME | End: 2021-02-26
Payer: MEDICARE

## 2021-02-26 DIAGNOSIS — M54.9 DORSALGIA, UNSPECIFIED: ICD-10-CM

## 2021-02-26 PROCEDURE — 72195 MRI PELVIS W/O DYE: CPT | Mod: 26

## 2021-02-26 PROCEDURE — 72148 MRI LUMBAR SPINE W/O DYE: CPT | Mod: 26

## 2021-03-02 ENCOUNTER — APPOINTMENT (OUTPATIENT)
Dept: CARDIOLOGY | Facility: CLINIC | Age: 76
End: 2021-03-02
Payer: MEDICARE

## 2021-03-02 PROCEDURE — 99072 ADDL SUPL MATRL&STAF TM PHE: CPT

## 2021-03-02 PROCEDURE — 93244 EXT ECG>48HR<7D REV&INTERPJ: CPT

## 2021-03-16 ENCOUNTER — NON-APPOINTMENT (OUTPATIENT)
Age: 76
End: 2021-03-16

## 2021-03-17 ENCOUNTER — NON-APPOINTMENT (OUTPATIENT)
Age: 76
End: 2021-03-17

## 2021-03-25 ENCOUNTER — APPOINTMENT (OUTPATIENT)
Dept: CARDIOLOGY | Facility: CLINIC | Age: 76
End: 2021-03-25
Payer: MEDICARE

## 2021-03-25 VITALS
WEIGHT: 182 LBS | BODY MASS INDEX: 30.29 KG/M2 | SYSTOLIC BLOOD PRESSURE: 120 MMHG | DIASTOLIC BLOOD PRESSURE: 70 MMHG | HEART RATE: 68 BPM

## 2021-03-25 PROCEDURE — 93000 ELECTROCARDIOGRAM COMPLETE: CPT

## 2021-03-25 PROCEDURE — 99213 OFFICE O/P EST LOW 20 MIN: CPT

## 2021-03-25 PROCEDURE — 99072 ADDL SUPL MATRL&STAF TM PHE: CPT

## 2021-04-08 ENCOUNTER — OUTPATIENT (OUTPATIENT)
Dept: OUTPATIENT SERVICES | Facility: HOSPITAL | Age: 76
LOS: 1 days | Discharge: HOME | End: 2021-04-08
Payer: MEDICARE

## 2021-04-08 VITALS
TEMPERATURE: 97 F | SYSTOLIC BLOOD PRESSURE: 140 MMHG | WEIGHT: 169.09 LBS | DIASTOLIC BLOOD PRESSURE: 67 MMHG | RESPIRATION RATE: 16 BRPM | HEIGHT: 64 IN | HEART RATE: 85 BPM | OXYGEN SATURATION: 100 %

## 2021-04-08 DIAGNOSIS — I25.10 ATHEROSCLEROTIC HEART DISEASE OF NATIVE CORONARY ARTERY WITHOUT ANGINA PECTORIS: ICD-10-CM

## 2021-04-08 DIAGNOSIS — Z98.890 OTHER SPECIFIED POSTPROCEDURAL STATES: Chronic | ICD-10-CM

## 2021-04-08 DIAGNOSIS — Z01.818 ENCOUNTER FOR OTHER PREPROCEDURAL EXAMINATION: ICD-10-CM

## 2021-04-08 LAB
A1C WITH ESTIMATED AVERAGE GLUCOSE RESULT: 6.1 % — HIGH (ref 4–5.6)
ALBUMIN SERPL ELPH-MCNC: 4.2 G/DL — SIGNIFICANT CHANGE UP (ref 3.5–5.2)
ALP SERPL-CCNC: 40 U/L — SIGNIFICANT CHANGE UP (ref 30–115)
ALT FLD-CCNC: 20 U/L — SIGNIFICANT CHANGE UP (ref 0–41)
ANION GAP SERPL CALC-SCNC: 12 MMOL/L — SIGNIFICANT CHANGE UP (ref 7–14)
APTT BLD: 31.5 SEC — SIGNIFICANT CHANGE UP (ref 27–39.2)
AST SERPL-CCNC: 27 U/L — SIGNIFICANT CHANGE UP (ref 0–41)
BASOPHILS # BLD AUTO: 0.03 K/UL — SIGNIFICANT CHANGE UP (ref 0–0.2)
BASOPHILS NFR BLD AUTO: 0.6 % — SIGNIFICANT CHANGE UP (ref 0–1)
BILIRUB SERPL-MCNC: 0.3 MG/DL — SIGNIFICANT CHANGE UP (ref 0.2–1.2)
BUN SERPL-MCNC: 25 MG/DL — HIGH (ref 10–20)
CALCIUM SERPL-MCNC: 9.8 MG/DL — SIGNIFICANT CHANGE UP (ref 8.5–10.1)
CHLORIDE SERPL-SCNC: 105 MMOL/L — SIGNIFICANT CHANGE UP (ref 98–110)
CO2 SERPL-SCNC: 24 MMOL/L — SIGNIFICANT CHANGE UP (ref 17–32)
CREAT SERPL-MCNC: 1.3 MG/DL — SIGNIFICANT CHANGE UP (ref 0.7–1.5)
EOSINOPHIL # BLD AUTO: 0.09 K/UL — SIGNIFICANT CHANGE UP (ref 0–0.7)
EOSINOPHIL NFR BLD AUTO: 1.8 % — SIGNIFICANT CHANGE UP (ref 0–8)
ESTIMATED AVERAGE GLUCOSE: 128 MG/DL — HIGH (ref 68–114)
GLUCOSE SERPL-MCNC: 155 MG/DL — HIGH (ref 70–99)
HCT VFR BLD CALC: 32.1 % — LOW (ref 42–52)
HGB BLD-MCNC: 10.3 G/DL — LOW (ref 14–18)
IMM GRANULOCYTES NFR BLD AUTO: 0.6 % — HIGH (ref 0.1–0.3)
INR BLD: 1 RATIO — SIGNIFICANT CHANGE UP (ref 0.65–1.3)
LYMPHOCYTES # BLD AUTO: 1.31 K/UL — SIGNIFICANT CHANGE UP (ref 1.2–3.4)
LYMPHOCYTES # BLD AUTO: 26.7 % — SIGNIFICANT CHANGE UP (ref 20.5–51.1)
MCHC RBC-ENTMCNC: 27.8 PG — SIGNIFICANT CHANGE UP (ref 27–31)
MCHC RBC-ENTMCNC: 32.1 G/DL — SIGNIFICANT CHANGE UP (ref 32–37)
MCV RBC AUTO: 86.8 FL — SIGNIFICANT CHANGE UP (ref 80–94)
MONOCYTES # BLD AUTO: 0.36 K/UL — SIGNIFICANT CHANGE UP (ref 0.1–0.6)
MONOCYTES NFR BLD AUTO: 7.3 % — SIGNIFICANT CHANGE UP (ref 1.7–9.3)
NEUTROPHILS # BLD AUTO: 3.09 K/UL — SIGNIFICANT CHANGE UP (ref 1.4–6.5)
NEUTROPHILS NFR BLD AUTO: 63 % — SIGNIFICANT CHANGE UP (ref 42.2–75.2)
NRBC # BLD: 0 /100 WBCS — SIGNIFICANT CHANGE UP (ref 0–0)
PLATELET # BLD AUTO: 294 K/UL — SIGNIFICANT CHANGE UP (ref 130–400)
POTASSIUM SERPL-MCNC: 4.6 MMOL/L — SIGNIFICANT CHANGE UP (ref 3.5–5)
POTASSIUM SERPL-SCNC: 4.6 MMOL/L — SIGNIFICANT CHANGE UP (ref 3.5–5)
PROT SERPL-MCNC: 6.9 G/DL — SIGNIFICANT CHANGE UP (ref 6–8)
PROTHROM AB SERPL-ACNC: 11.5 SEC — SIGNIFICANT CHANGE UP (ref 9.95–12.87)
RBC # BLD: 3.7 M/UL — LOW (ref 4.7–6.1)
RBC # FLD: 16.1 % — HIGH (ref 11.5–14.5)
SODIUM SERPL-SCNC: 141 MMOL/L — SIGNIFICANT CHANGE UP (ref 135–146)
WBC # BLD: 4.91 K/UL — SIGNIFICANT CHANGE UP (ref 4.8–10.8)
WBC # FLD AUTO: 4.91 K/UL — SIGNIFICANT CHANGE UP (ref 4.8–10.8)

## 2021-04-08 PROCEDURE — 93010 ELECTROCARDIOGRAM REPORT: CPT

## 2021-04-08 NOTE — H&P PST ADULT - NSICDXPASTMEDICALHX_GEN_ALL_CORE_FT
PAST MEDICAL HISTORY:  Anemia     Asthma     CAD (coronary artery disease)     DM (diabetes mellitus)     H/O myocardial ischemia     High cholesterol     HTN (hypertension)     Neuropathy     Spinal stenosis

## 2021-04-08 NOTE — H&P PST ADULT - HISTORY OF PRESENT ILLNESS
Pt is mostly Nepali speaking. Pt daughter (Anny Bernal) states that pt has a hx of MI approx 23 years ago but his recent stress tests have been abnormal. Daughter also states that he wore a holter monitor which showed an excessive amount of PVCs. Denies any symptoms. Now for listed procedure. Denies any chest pain, difficulty breathing, SOB, palpitations, dysuria, URI, or any other infections in the last 2 weeks. Denies any recent travel, contact, or exposure to any persons with known or suspected COVID-19. Pt also denies COVID testing within the last 2 weeks. Denies any suicidal or homicidal ideations. Pt advised to self quarantine until day of procedure. Exercise tolerance of 1 flight of stairs without dyspnea but with limit d/t spinal stenosis. KATHRIN reviewed with patient. Pt verbalized understanding of all pre-operative instructions.    Anesthesia Alert  NO--Difficult Airway  NO--History of neck surgery or radiation  NO--Limited ROM of neck  NO--History of Malignant hyperthermia  NO--No personal or family history of Pseudocholinesterase deficiency.  NO--Prior Anesthesia Complication  NO--Latex Allergy  NO--Loose teeth  NO--History of Rheumatoid Arthritis  NO--KATHRIN  NO--Other_____

## 2021-04-08 NOTE — H&P PST ADULT - REASON FOR ADMISSION
77 yo male presents for PAST in preparation for left heart catheterization on 4/15/2021 under sedation by Dr. Ojeda (Cardiac Cath).

## 2021-04-12 ENCOUNTER — OUTPATIENT (OUTPATIENT)
Dept: OUTPATIENT SERVICES | Facility: HOSPITAL | Age: 76
LOS: 1 days | Discharge: HOME | End: 2021-04-12

## 2021-04-12 ENCOUNTER — LABORATORY RESULT (OUTPATIENT)
Age: 76
End: 2021-04-12

## 2021-04-12 DIAGNOSIS — Z11.59 ENCOUNTER FOR SCREENING FOR OTHER VIRAL DISEASES: ICD-10-CM

## 2021-04-12 DIAGNOSIS — Z98.890 OTHER SPECIFIED POSTPROCEDURAL STATES: Chronic | ICD-10-CM

## 2021-04-12 PROBLEM — E78.00 PURE HYPERCHOLESTEROLEMIA, UNSPECIFIED: Chronic | Status: ACTIVE | Noted: 2021-04-08

## 2021-04-12 PROBLEM — I25.10 ATHEROSCLEROTIC HEART DISEASE OF NATIVE CORONARY ARTERY WITHOUT ANGINA PECTORIS: Chronic | Status: ACTIVE | Noted: 2021-04-08

## 2021-04-12 PROBLEM — I10 ESSENTIAL (PRIMARY) HYPERTENSION: Chronic | Status: ACTIVE | Noted: 2021-04-08

## 2021-04-12 PROBLEM — G62.9 POLYNEUROPATHY, UNSPECIFIED: Chronic | Status: ACTIVE | Noted: 2021-04-08

## 2021-04-12 PROBLEM — D64.9 ANEMIA, UNSPECIFIED: Chronic | Status: ACTIVE | Noted: 2021-04-08

## 2021-04-12 PROBLEM — J45.909 UNSPECIFIED ASTHMA, UNCOMPLICATED: Chronic | Status: ACTIVE | Noted: 2021-04-08

## 2021-04-12 PROBLEM — E11.9 TYPE 2 DIABETES MELLITUS WITHOUT COMPLICATIONS: Chronic | Status: ACTIVE | Noted: 2021-04-08

## 2021-04-12 PROBLEM — M48.00 SPINAL STENOSIS, SITE UNSPECIFIED: Chronic | Status: ACTIVE | Noted: 2021-04-08

## 2021-04-12 PROBLEM — Z86.79 PERSONAL HISTORY OF OTHER DISEASES OF THE CIRCULATORY SYSTEM: Chronic | Status: ACTIVE | Noted: 2021-04-08

## 2021-04-15 ENCOUNTER — INPATIENT (INPATIENT)
Facility: HOSPITAL | Age: 76
LOS: 0 days | Discharge: HOME | End: 2021-04-16
Attending: INTERNAL MEDICINE | Admitting: INTERNAL MEDICINE
Payer: MEDICARE

## 2021-04-15 VITALS
HEART RATE: 95 BPM | DIASTOLIC BLOOD PRESSURE: 69 MMHG | RESPIRATION RATE: 18 BRPM | HEIGHT: 64 IN | SYSTOLIC BLOOD PRESSURE: 149 MMHG | TEMPERATURE: 98 F | WEIGHT: 183.87 LBS

## 2021-04-15 DIAGNOSIS — Z98.890 OTHER SPECIFIED POSTPROCEDURAL STATES: Chronic | ICD-10-CM

## 2021-04-15 LAB
ANION GAP SERPL CALC-SCNC: 14 MMOL/L — SIGNIFICANT CHANGE UP (ref 7–14)
BUN SERPL-MCNC: 26 MG/DL — HIGH (ref 10–20)
CALCIUM SERPL-MCNC: 9.6 MG/DL — SIGNIFICANT CHANGE UP (ref 8.5–10.1)
CHLORIDE SERPL-SCNC: 105 MMOL/L — SIGNIFICANT CHANGE UP (ref 98–110)
CO2 SERPL-SCNC: 22 MMOL/L — SIGNIFICANT CHANGE UP (ref 17–32)
CREAT SERPL-MCNC: 1.2 MG/DL — SIGNIFICANT CHANGE UP (ref 0.7–1.5)
GLUCOSE BLDC GLUCOMTR-MCNC: 103 MG/DL — HIGH (ref 70–99)
GLUCOSE BLDC GLUCOMTR-MCNC: 112 MG/DL — HIGH (ref 70–99)
GLUCOSE BLDC GLUCOMTR-MCNC: 150 MG/DL — HIGH (ref 70–99)
GLUCOSE SERPL-MCNC: 102 MG/DL — HIGH (ref 70–99)
HCT VFR BLD CALC: 31.1 % — LOW (ref 42–52)
HGB BLD-MCNC: 9.9 G/DL — LOW (ref 14–18)
MCHC RBC-ENTMCNC: 27.7 PG — SIGNIFICANT CHANGE UP (ref 27–31)
MCHC RBC-ENTMCNC: 31.8 G/DL — LOW (ref 32–37)
MCV RBC AUTO: 87.1 FL — SIGNIFICANT CHANGE UP (ref 80–94)
NRBC # BLD: 0 /100 WBCS — SIGNIFICANT CHANGE UP (ref 0–0)
PLATELET # BLD AUTO: 298 K/UL — SIGNIFICANT CHANGE UP (ref 130–400)
POTASSIUM SERPL-MCNC: 4.3 MMOL/L — SIGNIFICANT CHANGE UP (ref 3.5–5)
POTASSIUM SERPL-SCNC: 4.3 MMOL/L — SIGNIFICANT CHANGE UP (ref 3.5–5)
RBC # BLD: 3.57 M/UL — LOW (ref 4.7–6.1)
RBC # FLD: 15.8 % — HIGH (ref 11.5–14.5)
SODIUM SERPL-SCNC: 141 MMOL/L — SIGNIFICANT CHANGE UP (ref 135–146)
WBC # BLD: 4.72 K/UL — LOW (ref 4.8–10.8)
WBC # FLD AUTO: 4.72 K/UL — LOW (ref 4.8–10.8)

## 2021-04-15 PROCEDURE — 92924 PRQ TRLUML C ATHRC 1 ART&/BR: CPT | Mod: LC

## 2021-04-15 PROCEDURE — 93458 L HRT ARTERY/VENTRICLE ANGIO: CPT | Mod: 26,XU

## 2021-04-15 PROCEDURE — 93010 ELECTROCARDIOGRAM REPORT: CPT

## 2021-04-15 RX ORDER — FENOFIBRATE,MICRONIZED 130 MG
145 CAPSULE ORAL DAILY
Refills: 0 | Status: DISCONTINUED | OUTPATIENT
Start: 2021-04-15 | End: 2021-04-16

## 2021-04-15 RX ORDER — SIMVASTATIN 20 MG/1
20 TABLET, FILM COATED ORAL AT BEDTIME
Refills: 0 | Status: DISCONTINUED | OUTPATIENT
Start: 2021-04-15 | End: 2021-04-16

## 2021-04-15 RX ORDER — ASPIRIN/CALCIUM CARB/MAGNESIUM 324 MG
81 TABLET ORAL DAILY
Refills: 0 | Status: DISCONTINUED | OUTPATIENT
Start: 2021-04-15 | End: 2021-04-16

## 2021-04-15 RX ORDER — TICAGRELOR 90 MG/1
1 TABLET ORAL
Qty: 60 | Refills: 1
Start: 2021-04-15 | End: 2021-06-13

## 2021-04-15 RX ORDER — TICAGRELOR 90 MG/1
90 TABLET ORAL
Refills: 0 | Status: DISCONTINUED | OUTPATIENT
Start: 2021-04-15 | End: 2021-04-16

## 2021-04-15 RX ORDER — LISINOPRIL 2.5 MG/1
40 TABLET ORAL DAILY
Refills: 0 | Status: DISCONTINUED | OUTPATIENT
Start: 2021-04-15 | End: 2021-04-16

## 2021-04-15 RX ORDER — CARVEDILOL PHOSPHATE 80 MG/1
3.12 CAPSULE, EXTENDED RELEASE ORAL EVERY 12 HOURS
Refills: 0 | Status: DISCONTINUED | OUTPATIENT
Start: 2021-04-15 | End: 2021-04-16

## 2021-04-15 RX ORDER — SODIUM CHLORIDE 9 MG/ML
1000 INJECTION INTRAMUSCULAR; INTRAVENOUS; SUBCUTANEOUS
Refills: 0 | Status: DISCONTINUED | OUTPATIENT
Start: 2021-04-15 | End: 2021-04-16

## 2021-04-15 RX ADMIN — CARVEDILOL PHOSPHATE 3.12 MILLIGRAM(S): 80 CAPSULE, EXTENDED RELEASE ORAL at 18:51

## 2021-04-15 RX ADMIN — TICAGRELOR 90 MILLIGRAM(S): 90 TABLET ORAL at 22:38

## 2021-04-15 RX ADMIN — SIMVASTATIN 20 MILLIGRAM(S): 20 TABLET, FILM COATED ORAL at 22:38

## 2021-04-15 NOTE — CHART NOTE - NSCHARTNOTEFT_GEN_A_CORE
PRE-OP DIAGNOSIS: CAD, abnormal stress test     PROCEDURE:[ x ] LHC                         [ x ] Coronary angiography                         [  ] RHC  Physician: Dr Ojeda  Assistant: Sabrina    ANESTHESIA TYPE:  [  ]General Anesthesia  [ x ] Sedation  [  ] Local/Regional    ESTIMATED BLOOD LOSS:    10   mL    CONDITION  [  ] Critical  [  ] Serious  [  ]Fair  [ x ]Good    IV CONTRAST:   300  mL    FINDINGS  Left Heart Catheterization:  LVEF%:  LVEDP: nl  [ ] Normal Coronary Arteries  [ ] Luminal Irregularities  [ ] Non-obstructive CAD    ACCESS:    [x ] right radial artery  [ ] right femoral artery    HEMOSTASIS:    [x ] D-Stat  [ ] Perclose  [ ] Manual compression    LEFT HEART CATHETERIZATION                                    Left main: mild disease  LAD: 70% diffuse lesion in mid LAD( iFR 0.92 , not hemodynamically significant)  Left Circumflex: 70% diffuse lesion in prox segment , ectatic segment in distal Cx followed by a 90% lesion prior to OM2 origin  Right Coronary Artery: 50% lesion in prox segment,  moderate disease in RPDA and RPL    SYNTAX I/II SCORE:    INTERVENTION  IMPLANTS: 2 BECCA    APPROPRIATE USE CRITERIA (AUC): 7    SPECIMEN REMOVED: N/A    POST-OP DIAGNOSIS  CAD with abnormal stress test , s/p Laser atherectomy and PCI with 1 BECCA ( 3.0x8mm) to distal Cx lesion , and PCI with BECCA in prox segment lesion  (3.5x33mm).  iFR to mid LAD lesion is 0.92 ( not hemodynamically significant).        PLAN OF CARE  [ ] D/C Home today  [ ]  D/C in AM  [ ] Return to In-patient bed  [x ] Admit for observation  [ ] Return for staged procedure:  [ ] CT Surgery consult called  [ x]  DAPT ( asa/brilinta) , BB , statin PRE-OP DIAGNOSIS: CAD, abnormal stress test     PROCEDURE:[ x ] LHC                         [ x ] Coronary angiography                         [  ] RHC  Physician: Dr Ojeda  Assistant: Sabrina    ANESTHESIA TYPE:  [  ]General Anesthesia  [ x ] Sedation  [  ] Local/Regional    ESTIMATED BLOOD LOSS:    10   mL    CONDITION  [  ] Critical  [  ] Serious  [  ]Fair  [ x ]Good    IV CONTRAST:   300  mL    FINDINGS  Left Heart Catheterization:  LVEF%:  LVEDP: nl  [ ] Normal Coronary Arteries  [ ] Luminal Irregularities  [ ] Non-obstructive CAD    ACCESS:    [x ] right radial artery  [ ] right femoral artery    HEMOSTASIS:    [x ] D-Stat  [ ] Perclose  [ ] Manual compression    LEFT HEART CATHETERIZATION                                    Left main: mild disease  LAD: 70% diffuse lesion in mid LAD( iFR 0.92 , not hemodynamically significant)  Left Circumflex: 70% diffuse lesion in prox segment , ectatic segment in distal Cx followed by a 95% lesion prior to OM2 origin  Right Coronary Artery: 80% lesion in prox segment,  moderate disease in RPDA and RPL    SYNTAX I/II SCORE:    INTERVENTION  IMPLANTS: 2 BECCA    APPROPRIATE USE CRITERIA (AUC): 7    SPECIMEN REMOVED: N/A    POST-OP DIAGNOSIS  CAD with abnormal stress test , s/p Laser atherectomy and PCI with 1 BECCA ( 3.0x8mm) to distal Cx lesion , and PCI with BECCA in prox segment lesion  (3.5x33mm).  iFR to mid LAD lesion is 0.92 ( not hemodynamically significant).        PLAN OF CARE  [ ] D/C Home today  [ ]  D/C in AM  [ ] Return to In-patient bed  [x ] Admit for observation  [ ] Return for staged procedure:  [ ] CT Surgery consult called  [ x]  DAPT ( asa/brilinta) , BB , statin

## 2021-04-15 NOTE — ASU PATIENT PROFILE, ADULT - PMH
Anemia    Asthma    CAD (coronary artery disease)    DM (diabetes mellitus)    H/O myocardial ischemia    High cholesterol    HTN (hypertension)    Neuropathy    Spinal stenosis

## 2021-04-15 NOTE — CHART NOTE - NSCHARTNOTEFT_GEN_A_CORE
PREOPERATIVE DAY OF PROCEDURE EVALUATION:  I have personally seen and examined the patient.  I agree with the history and physical which I have reviewed and noted any changes below.  (Signed electronically by __________)  04-15-21 @ 14:55

## 2021-04-16 ENCOUNTER — TRANSCRIPTION ENCOUNTER (OUTPATIENT)
Age: 76
End: 2021-04-16

## 2021-04-16 VITALS — OXYGEN SATURATION: 98 %

## 2021-04-16 LAB
ANION GAP SERPL CALC-SCNC: 11 MMOL/L — SIGNIFICANT CHANGE UP (ref 7–14)
BUN SERPL-MCNC: 19 MG/DL — SIGNIFICANT CHANGE UP (ref 10–20)
CALCIUM SERPL-MCNC: 8.8 MG/DL — SIGNIFICANT CHANGE UP (ref 8.5–10.1)
CHLORIDE SERPL-SCNC: 107 MMOL/L — SIGNIFICANT CHANGE UP (ref 98–110)
CO2 SERPL-SCNC: 24 MMOL/L — SIGNIFICANT CHANGE UP (ref 17–32)
COVID-19 SPIKE DOMAIN AB INTERP: POSITIVE
COVID-19 SPIKE DOMAIN ANTIBODY RESULT: >250 U/ML — HIGH
CREAT SERPL-MCNC: 1.3 MG/DL — SIGNIFICANT CHANGE UP (ref 0.7–1.5)
GLUCOSE BLDC GLUCOMTR-MCNC: 112 MG/DL — HIGH (ref 70–99)
GLUCOSE SERPL-MCNC: 158 MG/DL — HIGH (ref 70–99)
HCT VFR BLD CALC: 31.1 % — LOW (ref 42–52)
HGB BLD-MCNC: 10 G/DL — LOW (ref 14–18)
MCHC RBC-ENTMCNC: 28.1 PG — SIGNIFICANT CHANGE UP (ref 27–31)
MCHC RBC-ENTMCNC: 32.2 G/DL — SIGNIFICANT CHANGE UP (ref 32–37)
MCV RBC AUTO: 87.4 FL — SIGNIFICANT CHANGE UP (ref 80–94)
NRBC # BLD: 0 /100 WBCS — SIGNIFICANT CHANGE UP (ref 0–0)
PLATELET # BLD AUTO: 279 K/UL — SIGNIFICANT CHANGE UP (ref 130–400)
POTASSIUM SERPL-MCNC: 3.8 MMOL/L — SIGNIFICANT CHANGE UP (ref 3.5–5)
POTASSIUM SERPL-SCNC: 3.8 MMOL/L — SIGNIFICANT CHANGE UP (ref 3.5–5)
RBC # BLD: 3.56 M/UL — LOW (ref 4.7–6.1)
RBC # FLD: 15.9 % — HIGH (ref 11.5–14.5)
SARS-COV-2 IGG+IGM SERPL QL IA: >250 U/ML — HIGH
SARS-COV-2 IGG+IGM SERPL QL IA: POSITIVE
SODIUM SERPL-SCNC: 142 MMOL/L — SIGNIFICANT CHANGE UP (ref 135–146)
WBC # BLD: 4.39 K/UL — LOW (ref 4.8–10.8)
WBC # FLD AUTO: 4.39 K/UL — LOW (ref 4.8–10.8)

## 2021-04-16 PROCEDURE — 99238 HOSP IP/OBS DSCHRG MGMT 30/<: CPT

## 2021-04-16 PROCEDURE — 93010 ELECTROCARDIOGRAM REPORT: CPT

## 2021-04-16 RX ORDER — RANITIDINE HYDROCHLORIDE 150 MG/1
1 TABLET, FILM COATED ORAL
Qty: 30 | Refills: 0
Start: 2021-04-16 | End: 2021-05-15

## 2021-04-16 RX ADMIN — TICAGRELOR 90 MILLIGRAM(S): 90 TABLET ORAL at 05:54

## 2021-04-16 RX ADMIN — Medication 145 MILLIGRAM(S): at 11:19

## 2021-04-16 RX ADMIN — Medication 81 MILLIGRAM(S): at 11:19

## 2021-04-16 RX ADMIN — LISINOPRIL 40 MILLIGRAM(S): 2.5 TABLET ORAL at 05:54

## 2021-04-16 NOTE — DISCHARGE NOTE NURSING/CASE MANAGEMENT/SOCIAL WORK - PATIENT PORTAL LINK FT
You can access the FollowMyHealth Patient Portal offered by [facility] by registering at the following website: [email or web address]. By joining [employer]’s FollowMyHealth portal, you will also be able to view your health information using other applications (apps) compatible with our system.

## 2021-04-16 NOTE — DISCHARGE NOTE PROVIDER - NSDCCPTREATMENT_GEN_ALL_CORE_FT
PRINCIPAL PROCEDURE  Procedure: Percutaneous coronary intervention (PCI) with insertion of stent into left circumflex coronary artery  Findings and Treatment: continue medical regimen as prescribed

## 2021-04-16 NOTE — DISCHARGE NOTE PROVIDER - PROVIDER TOKENS
PROVIDER:[TOKEN:[[id number]],FOLLOWUP:[1 week]] Rhomboid Transposition Flap Text: The defect edges were debeveled with a #15 scalpel blade.  Given the location of the defect and the proximity to free margins a rhomboid transposition flap was deemed most appropriate.  Using a sterile surgical marker, an appropriate rhomboid flap was drawn incorporating the defect.    The area thus outlined was incised deep to adipose tissue with a #15 scalpel blade.  The skin margins were undermined to an appropriate distance in all directions utilizing iris scissors.

## 2021-04-16 NOTE — PROGRESS NOTE ADULT - SUBJECTIVE AND OBJECTIVE BOX
Cardiology Follow up    JAMES ESCOTO   76y Male  PAST MEDICAL & SURGICAL HISTORY:  DM (diabetes mellitus)    HTN (hypertension)    CAD (coronary artery disease)    Anemia    Spinal stenosis    High cholesterol    H/O myocardial ischemia    Neuropathy    Asthma    H/O colonoscopy    History of surgery on arm         HPI:    Allergies    No Known Allergies    Intolerances    Patient seen and examined at bedside. No acute events overnight.  Patient without complaints. Pt ambulated without issues/symptoms  Denies CP, SOB, palpitations, or dizziness  Bradycardia events noted on telemetry overnight    Vital Signs Last 24 Hrs  T(C): 36.7 (16 Apr 2021 05:47), Max: 36.7 (16 Apr 2021 05:47)  T(F): 98.1 (16 Apr 2021 05:47), Max: 98.1 (16 Apr 2021 05:47)  HR: 79 (16 Apr 2021 05:47) (79 - 95)  BP: 101/62 (16 Apr 2021 05:47) (101/62 - 149/69)  BP(mean): --  RR: 18 (16 Apr 2021 05:47) (18 - 18)  SpO2: --    MEDICATIONS  (STANDING):  aspirin enteric coated 81 milliGRAM(s) Oral daily  carvedilol 3.125 milliGRAM(s) Oral every 12 hours  fenofibrate Tablet 145 milliGRAM(s) Oral daily  lisinopril 40 milliGRAM(s) Oral daily  simvastatin 20 milliGRAM(s) Oral at bedtime  ticagrelor 90 milliGRAM(s) Oral two times a day    MEDICATIONS  (PRN):      REVIEW OF SYSTEMS:          All negative except as mentioned in HPI    PHYSICAL EXAM:           CONSTITUTIONAL: Well-developed; well-nourished; in no acute distress  	SKIN: warm, dry  	HEAD: Normocephalic; atraumatic  	EYES: PERRL.  	ENT: No nasal discharge, airway clear, mucous membranes moist  	NECK: Supple; non tender.  	CARD: +S1, +S2, no murmurs, gallops, or rubs. Regular rate and rhythm    	RESP: No wheezes, rales or rhonchi. CTA B/L  	ABD: soft ntnd, + BS x 4 quadrants  	EXT: moves all extremities,  no clubbing, cyanosis or edema  	NEURO: Alert and oriented x3, no focal deficits          PSYCH: Cooperative, appropriate          VASCULAR:  + Rad / + PTs / +  DPs          EXTREMITY:             Right Groin:  Dressing D/C/I, pressure dressing removed, access site soft, no hematoma, no pain, + pulses, no sign of infection, no numbness  	   Right Radial: Dressing D/C/I, pressure dressing removed, access site soft, no hematoma, no pain, + pulses, no sign of infection, no numbness            ECG:   < from: 12 Lead ECG (04.15.21 @ 20:28) >  Ventricular Rate 79 BPM    Atrial Rate 79 BPM    P-R Interval 282 ms    QRS Duration 134 ms    Q-T Interval 424 ms    QTC Calculation(Bazett) 486 ms    P Axis 64 degrees    R Axis 98 degrees    T Axis -13 degrees    Diagnosis Line Atrial fibrillation with  Premature ventricular complexes  Rightward axis  Non-specific intra-ventricular conduction block  Cannot rule out Inferior infarct , age undetermined  Abnormal ECG    Confirmed by WILLIAN FRENCH MD ([id number]) on 4/15/2021 9:31:11 PM                                                                                     LABS:                        10.0   4.39  )-----------( 279      ( 16 Apr 2021 08:41 )             31.1     04-16    142  |  107  |  19  ----------------------------<  158<H>  3.8   |  24  |  1.3    Ca    8.8      16 Apr 2021 04:56    A/P:  I discussed the case with Cardiologist Dr. Ojeda and recommend the following:    S/P PCI:   LEFT HEART CATHETERIZATION                                    Left main: mild disease  LAD: 70% diffuse lesion in mid LAD( iFR 0.92 , not hemodynamically significant)  Left Circumflex: 70% diffuse lesion in prox segment , ectatic segment in distal Cx followed by a 95% lesion prior to OM2 origin  Right Coronary Artery: 80% lesion in prox segment,  moderate disease in RPDA and RPL    SYNTAX I/II SCORE:    INTERVENTION  IMPLANTS: 2 BECCA    APPROPRIATE USE CRITERIA (AUC): 7    SPECIMEN REMOVED: N/A    POST-OP DIAGNOSIS  CAD with abnormal stress test , s/p Laser atherectomy and PCI with 1 BECCA ( 3.0x8mm) to distal Cx lesion , and PCI with BECCA in prox segment lesion  (3.5x33mm).  iFR to mid LAD lesion is 0.92 ( not hemodynamically significant).                        Hold Janumet for 48 hr after Cardiac Cath                    Start Zantac for GI prophylaxis   	     Continue DAPT ( Aspirin 81 mg PO Daily and Brilinta 90 mg po Q12hr ), ACEi, B-Blocker, Statin Therapy                   Patient pharmacy called in for Brilinta prescription and it is 0$ per month                   Patient agreeing to take DAPT for at least one year or as directed by cardiologist                    Pt given instructions on importance of taking antiplatelet medication or risk acute stent thrombosis/death                   Post cath instructions, access site care and activity restrictions reviewed with patient                     Discussed with patient to return to hospital if experience chest pain, shortness breath, dizziness and site bleeding                   Aggressive risk factor modification, diet counseling, smoking cessation discussed with patient                       Cardiac rehab information provided/ referral and communication to cardiac rehab provided                   Can discharge patient from cardiac standpoint after ambulating without symptoms and access site wnl, ECG and blood work reviewed                    Follow up with Cardiology Dr. Garcia in two weeks. Instructed to call and make an appointment

## 2021-04-16 NOTE — DISCHARGE NOTE PROVIDER - NSDCCPCAREPLAN_GEN_ALL_CORE_FT
PRINCIPAL DISCHARGE DIAGNOSIS  Diagnosis: CAD S/P percutaneous coronary angioplasty  Assessment and Plan of Treatment: continue medical regimen as prescribed

## 2021-04-16 NOTE — DISCHARGE NOTE PROVIDER - NSDCFUSCHEDAPPT_GEN_ALL_CORE_FT
JAMES ESCOTO ; 05/04/2021 ; NPP Cardio [address] JAMES Adames ; 05/13/2021 ; NP Cardio [address]

## 2021-04-16 NOTE — DISCHARGE NOTE PROVIDER - CARE PROVIDER_API CALL
Ifeanyi Garcia)  Cardiovascular Disease; Internal Medicine  [address] [place] [address]  Phone: [phone]  Fax: [phone]  Follow Up Time: 1 week

## 2021-04-16 NOTE — DISCHARGE NOTE PROVIDER - NSDCFUADDINST_GEN_ALL_CORE_FT
no heavy lifting > 10lbs x 1 week; no driving x 24 hours; no baths, swimming pools x 1 week; may shower  hold janumet x 48 hrs post cath  continue DAPT, BB, Statin, ACE.  Instructed to call 911 if chest pain, shortness of breath or bleeding from access site. F/U with cardiologist in 1 week   low sodium low fat low cholesterol diabetic diet

## 2021-04-16 NOTE — DISCHARGE NOTE PROVIDER - HOSPITAL COURSE
75 y/o M s/p PCI, admitted for observation stable for dc as discussed with Dr. Ojeda      Left Heart Catheterization:  LVEF%:  LVEDP: nl  [ ] Normal Coronary Arteries  [ ] Luminal Irregularities  [ ] Non-obstructive CAD    ACCESS:    [x ] right radial artery  [ ] right femoral artery    HEMOSTASIS:    [x ] D-Stat  [ ] Perclose  [ ] Manual compression    LEFT HEART CATHETERIZATION                                    Left main: mild disease  LAD: 70% diffuse lesion in mid LAD( iFR 0.92 , not hemodynamically significant)  Left Circumflex: 70% diffuse lesion in prox segment , ectatic segment in distal Cx followed by a 95% lesion prior to OM2 origin  Right Coronary Artery: 80% lesion in prox segment,  moderate disease in RPDA and RPL    SYNTAX I/II SCORE:    INTERVENTION  IMPLANTS: 2 BECCA    APPROPRIATE USE CRITERIA (AUC): 7    SPECIMEN REMOVED: N/A    POST-OP DIAGNOSIS  CAD with abnormal stress test , s/p Laser atherectomy and PCI with 1 BECCA ( 3.0x8mm) to distal Cx lesion , and PCI with BECCA in prox segment lesion  (3.5x33mm).  iFR to mid LAD lesion is 0.92 ( not hemodynamically significant).        PLAN OF CARE  [ ] D/C Home today  [ ]  D/C in AM  [ ] Return to In-patient bed  [x ] Admit for observation  [ ] Return for staged procedure:  [ ] CT Surgery consult called  [ x]  DAPT ( asa/brilinta) , BB , statin.    Pt stable for discharge as discussed with Dr. Ojeda

## 2021-04-16 NOTE — DISCHARGE NOTE PROVIDER - NSDCMRMEDTOKEN_GEN_ALL_CORE_FT
Aspir 81 oral delayed release tablet: 1 tab(s) orally once a day  azelastine 0.05% ophthalmic solution: 1 drop(s) to each affected eye once a day  azelastine 137 mcg/inh (0.1%) nasal spray: 1 spray(s) nasal 2 times a day, As Needed  carvedilol 3.125 mg oral tablet: 1 tab(s) orally 2 times a day  cetirizine 10 mg oral tablet: 1 tab(s) orally once a day  fenofibrate 145 mg oral tablet: 1 tab(s) orally once a day  glipiZIDE 10 mg oral tablet: 0.5 tab(s) orally 2 times a day  Iron Chews 15 mg oral tablet, chewable: 1 tab(s) orally once a day  Janumet XR 50 mg-1000 mg oral tablet, extended release: 1 tab(s) orally 2 times a day  HOLD MEDICATION FOR 48 HR AFTER CARDIAC CATH  pioglitazone 45 mg oral tablet: 1 tab(s) orally once a day, As Needed  ProAir HFA 90 mcg/inh inhalation aerosol: 2 puff(s) inhaled every 6 hours, As Needed  ramipril 10 mg oral capsule: 1 cap(s) orally once a day  simvastatin 20 mg oral tablet: 1 tab(s) orally once a day (at bedtime)  ticagrelor 90 mg oral tablet: 1 tab(s) orally 2 times a day MDD:2  Vitamin B-100 oral tablet: 1 tab(s) orally once a day  Vitamin B-12 250 mcg oral tablet: 1 tab(s) orally once a day   Aspir 81 oral delayed release tablet: 1 tab(s) orally once a day  azelastine 0.05% ophthalmic solution: 1 drop(s) to each affected eye once a day  azelastine 137 mcg/inh (0.1%) nasal spray: 1 spray(s) nasal 2 times a day, As Needed  carvedilol 3.125 mg oral tablet: 1 tab(s) orally 2 times a day  cetirizine 10 mg oral tablet: 1 tab(s) orally once a day  fenofibrate 145 mg oral tablet: 1 tab(s) orally once a day  glipiZIDE 10 mg oral tablet: 0.5 tab(s) orally 2 times a day  Iron Chews 15 mg oral tablet, chewable: 1 tab(s) orally once a day  Janumet XR 50 mg-1000 mg oral tablet, extended release: 1 tab(s) orally 2 times a day  HOLD MEDICATION FOR 48 HR AFTER CARDIAC CATH  pioglitazone 45 mg oral tablet: 1 tab(s) orally once a day, As Needed  ProAir HFA 90 mcg/inh inhalation aerosol: 2 puff(s) inhaled every 6 hours, As Needed  ramipril 10 mg oral capsule: 1 cap(s) orally once a day  simvastatin 20 mg oral tablet: 1 tab(s) orally once a day (at bedtime)  ticagrelor 90 mg oral tablet: 1 tab(s) orally 2 times a day MDD:2  Vitamin B-100 oral tablet: 1 tab(s) orally once a day  Vitamin B-12 250 mcg oral tablet: 1 tab(s) orally once a day  Zantac 150 oral tablet: 1 tab(s) orally once a day MDD:1

## 2021-04-20 DIAGNOSIS — I34.0 NONRHEUMATIC MITRAL (VALVE) INSUFFICIENCY: ICD-10-CM

## 2021-04-20 DIAGNOSIS — I10 ESSENTIAL (PRIMARY) HYPERTENSION: ICD-10-CM

## 2021-04-20 DIAGNOSIS — I25.119 ATHEROSCLEROTIC HEART DISEASE OF NATIVE CORONARY ARTERY WITH UNSPECIFIED ANGINA PECTORIS: ICD-10-CM

## 2021-04-20 DIAGNOSIS — I47.1 SUPRAVENTRICULAR TACHYCARDIA: ICD-10-CM

## 2021-04-20 DIAGNOSIS — D64.9 ANEMIA, UNSPECIFIED: ICD-10-CM

## 2021-04-20 DIAGNOSIS — E78.5 HYPERLIPIDEMIA, UNSPECIFIED: ICD-10-CM

## 2021-04-20 DIAGNOSIS — I25.2 OLD MYOCARDIAL INFARCTION: ICD-10-CM

## 2021-04-20 DIAGNOSIS — I49.3 VENTRICULAR PREMATURE DEPOLARIZATION: ICD-10-CM

## 2021-04-20 DIAGNOSIS — J45.909 UNSPECIFIED ASTHMA, UNCOMPLICATED: ICD-10-CM

## 2021-04-20 DIAGNOSIS — Z79.51 LONG TERM (CURRENT) USE OF INHALED STEROIDS: ICD-10-CM

## 2021-04-20 DIAGNOSIS — I25.84 CORONARY ATHEROSCLEROSIS DUE TO CALCIFIED CORONARY LESION: ICD-10-CM

## 2021-04-20 DIAGNOSIS — E11.40 TYPE 2 DIABETES MELLITUS WITH DIABETIC NEUROPATHY, UNSPECIFIED: ICD-10-CM

## 2021-04-20 DIAGNOSIS — M48.00 SPINAL STENOSIS, SITE UNSPECIFIED: ICD-10-CM

## 2021-04-20 DIAGNOSIS — Z79.82 LONG TERM (CURRENT) USE OF ASPIRIN: ICD-10-CM

## 2021-04-20 DIAGNOSIS — Z79.84 LONG TERM (CURRENT) USE OF ORAL HYPOGLYCEMIC DRUGS: ICD-10-CM

## 2021-04-20 DIAGNOSIS — I35.1 NONRHEUMATIC AORTIC (VALVE) INSUFFICIENCY: ICD-10-CM

## 2021-04-26 ENCOUNTER — RESULT CHARGE (OUTPATIENT)
Age: 76
End: 2021-04-26

## 2021-04-26 ENCOUNTER — APPOINTMENT (OUTPATIENT)
Dept: CARDIOLOGY | Facility: CLINIC | Age: 76
End: 2021-04-26
Payer: MEDICARE

## 2021-04-26 VITALS
BODY MASS INDEX: 29.82 KG/M2 | HEART RATE: 83 BPM | HEIGHT: 65 IN | DIASTOLIC BLOOD PRESSURE: 70 MMHG | TEMPERATURE: 97.6 F | WEIGHT: 179 LBS | SYSTOLIC BLOOD PRESSURE: 140 MMHG

## 2021-04-26 VITALS — DIASTOLIC BLOOD PRESSURE: 70 MMHG | SYSTOLIC BLOOD PRESSURE: 110 MMHG

## 2021-04-26 PROCEDURE — 99072 ADDL SUPL MATRL&STAF TM PHE: CPT

## 2021-04-26 PROCEDURE — 93000 ELECTROCARDIOGRAM COMPLETE: CPT

## 2021-04-26 PROCEDURE — 99214 OFFICE O/P EST MOD 30 MIN: CPT

## 2021-04-26 RX ORDER — AMLODIPINE BESYLATE 2.5 MG/1
2.5 TABLET ORAL DAILY
Qty: 90 | Refills: 3 | Status: DISCONTINUED | COMMUNITY
Start: 1900-01-01 | End: 2021-04-26

## 2021-04-26 NOTE — ASSESSMENT
[FreeTextEntry1] : The patient had 3 vessel CAD  . LAD is not significant by FFR . He had 2 BECCA placed in the LCX . The RCA may need further intervention but was a calcified lesion . The patient has not had chest pain  He is back on low dose beta blockers .

## 2021-04-26 NOTE — CARDIOLOGY SUMMARY
[___] : [unfilled] [de-identified] : 4- NSR with firt degree AV block IVCD rightward axis IVCD  [de-identified] : 3-2-2021 PVC's NS VT  [de-identified] : 3- Lexiscan stress test inferior ischemia .  [de-identified] : 4- Normal LV systolic function mild MR and Mild TR .  [de-identified] : 4- LAD 70% stenosis not signficnt by FFR  70% lesion int eh prox LCX and 95% lesion in the distal LCX and 70-80% proc RCA \par 3.x12 Xience stent BECCA in prox LCX \par 3.5x 33 Xience BECCA in the distal lesio  [de-identified] : 10-7-2019 Mild Carotid disease bilaterally .

## 2021-04-26 NOTE — HISTORY OF PRESENT ILLNESS
[FreeTextEntry1] : The patient had a cardiac cath perfromed. There was a 70 % lesion in the LAD which was not significant and severe proximal and distal LCX lesion . Had atherectomy and stent in the distal lesion and stent in the proximal lesion He has not had chest pain and feels well overall. He had een seen by Dr. Mcelroy for his VT and frequent PVC's

## 2021-04-26 NOTE — REASON FOR VISIT
[Other: ____] : [unfilled] [Family Member] : family member [Follow-Up - Clinic] : a clinic follow-up of [Coronary Artery Disease] : coronary artery disease [Hyperlipidemia] : hyperlipidemia [Hypertension] : hypertension [Medication Management] : Medication management [FreeTextEntry3] : Dr. Canada  [FreeTextEntry1] : dizziness

## 2021-05-02 ENCOUNTER — RX RENEWAL (OUTPATIENT)
Age: 76
End: 2021-05-02

## 2021-05-04 ENCOUNTER — APPOINTMENT (OUTPATIENT)
Dept: CARDIOLOGY | Facility: CLINIC | Age: 76
End: 2021-05-04
Payer: MEDICARE

## 2021-05-04 PROCEDURE — 93306 TTE W/DOPPLER COMPLETE: CPT

## 2021-05-04 PROCEDURE — 99072 ADDL SUPL MATRL&STAF TM PHE: CPT

## 2021-05-13 ENCOUNTER — APPOINTMENT (OUTPATIENT)
Dept: CARDIOLOGY | Facility: CLINIC | Age: 76
End: 2021-05-13

## 2021-05-29 NOTE — ASSESSMENT
[FreeTextEntry1] : NSVT - Patient has normal ejection fraction and no new CAD on recent catheterization.\par \par – Recommend continue beta blockers. \par – no further EP intervention required at this time.

## 2021-05-29 NOTE — HISTORY OF PRESENT ILLNESS
[FreeTextEntry1] : He had frequent PVC's up to 7% burden. He did have 1 4 beat run of VT. He was on Coreg in the past which had been stopped for IVCD with prolonged first degree AV block,The patient has CAD. The patient had an MI in 1998 and had POBA at the time. He has had an old IWMI. Characterization show no significant new CAD.\par \par \par \par \par EKG SR 84 bpm 1st degre AV block RBBB\par Event monitor – episode of NSVT and PVC [Syncope] : no syncope [Dizziness] : no dizziness [Erythema at Site] : no erythema at device site [Swelling at Site] : no swelling at device site

## 2021-06-04 ENCOUNTER — APPOINTMENT (OUTPATIENT)
Dept: CARDIOLOGY | Facility: CLINIC | Age: 76
End: 2021-06-04

## 2021-06-07 ENCOUNTER — RX RENEWAL (OUTPATIENT)
Age: 76
End: 2021-06-07

## 2021-06-15 ENCOUNTER — APPOINTMENT (OUTPATIENT)
Dept: CARDIOLOGY | Facility: CLINIC | Age: 76
End: 2021-06-15
Payer: MEDICARE

## 2021-06-15 PROCEDURE — 99072 ADDL SUPL MATRL&STAF TM PHE: CPT

## 2021-06-15 PROCEDURE — 93880 EXTRACRANIAL BILAT STUDY: CPT

## 2021-07-02 ENCOUNTER — APPOINTMENT (OUTPATIENT)
Dept: CARDIOLOGY | Facility: CLINIC | Age: 76
End: 2021-07-02
Payer: MEDICARE

## 2021-07-02 PROCEDURE — 99072 ADDL SUPL MATRL&STAF TM PHE: CPT

## 2021-07-02 PROCEDURE — 93272 ECG/REVIEW INTERPRET ONLY: CPT

## 2021-07-09 ENCOUNTER — APPOINTMENT (OUTPATIENT)
Dept: CARDIOLOGY | Facility: CLINIC | Age: 76
End: 2021-07-09
Payer: MEDICARE

## 2021-07-09 ENCOUNTER — APPOINTMENT (OUTPATIENT)
Dept: CARDIOLOGY | Facility: CLINIC | Age: 76
End: 2021-07-09

## 2021-07-09 VITALS
SYSTOLIC BLOOD PRESSURE: 114 MMHG | TEMPERATURE: 97.1 F | DIASTOLIC BLOOD PRESSURE: 70 MMHG | WEIGHT: 173 LBS | HEART RATE: 85 BPM | HEIGHT: 65 IN | BODY MASS INDEX: 28.82 KG/M2

## 2021-07-09 PROCEDURE — 99214 OFFICE O/P EST MOD 30 MIN: CPT

## 2021-07-09 PROCEDURE — 93000 ELECTROCARDIOGRAM COMPLETE: CPT

## 2021-07-09 PROCEDURE — 99072 ADDL SUPL MATRL&STAF TM PHE: CPT

## 2021-07-09 RX ORDER — IRON ASPGLY,PS/C/SUCCINIC ACID 150-50-50
150-50-50 CAPSULE ORAL TWICE DAILY
Refills: 0 | Status: DISCONTINUED | COMMUNITY
End: 2021-07-09

## 2021-07-09 RX ORDER — PIOGLITAZONE HYDROCHLORIDE 45 MG/1
45 TABLET ORAL DAILY
Refills: 0 | Status: DISCONTINUED | COMMUNITY
End: 2021-07-09

## 2021-07-09 RX ORDER — FAMOTIDINE 20 MG/1
20 TABLET, FILM COATED ORAL DAILY
Refills: 0 | Status: DISCONTINUED | COMMUNITY
End: 2021-07-09

## 2021-07-09 NOTE — CARDIOLOGY SUMMARY
[___] : [unfilled] [de-identified] : 4- NSR with firt degree AV block IVCD rightward axis IVCD  [de-identified] : 3-2-2021 PVC's NS VT  [de-identified] : 3- Lexiscan stress test inferior ischemia .  [de-identified] : 4- Normal LV systolic function mild MR and Mild TR .  [de-identified] : 4- LAD 70% stenosis not signficnt by FFR  70% lesion int eh prox LCX and 95% lesion in the distal LCX and 70-80% proc RCA \par 3.x12 Xience stent BECCA in prox LCX \par 3.5x 33 Xience BECCA in the distal lesio  [de-identified] : 10-7-2019 Mild Carotid disease bilaterally .

## 2021-07-09 NOTE — HISTORY OF PRESENT ILLNESS
[FreeTextEntry1] : The patient had a cardiac cath performed. There was a 70 % lesion in the LAD which was not significant and severe proximal and distal LCX lesion . Had atherectomy and stent in the distal lesion and stent in the proximal lesion He has not had chest pain and feels well overall. He had een seen by Dr. Mcelroy for his VT and frequent PVC's . The patient had a repeat MCOT monitor which showed 12% PVC's . The patiient has 80% Proc RCA lesion which is heavily calcified .

## 2021-07-09 NOTE — REASON FOR VISIT
[Symptom and Test Evaluation] : symptom and test evaluation [Other: ____] : [unfilled] [Family Member] : family member [Follow-Up - Clinic] : a clinic follow-up of [Coronary Artery Disease] : coronary artery disease [Hyperlipidemia] : hyperlipidemia [Hypertension] : hypertension [Medication Management] : Medication management [FreeTextEntry3] : Dr. Canada  [FreeTextEntry1] : dizziness

## 2021-07-13 ENCOUNTER — APPOINTMENT (OUTPATIENT)
Dept: OTOLARYNGOLOGY | Facility: CLINIC | Age: 76
End: 2021-07-13
Payer: MEDICARE

## 2021-07-13 PROCEDURE — 31231 NASAL ENDOSCOPY DX: CPT

## 2021-07-13 PROCEDURE — 92557 COMPREHENSIVE HEARING TEST: CPT

## 2021-07-13 PROCEDURE — 99072 ADDL SUPL MATRL&STAF TM PHE: CPT

## 2021-07-13 PROCEDURE — 99214 OFFICE O/P EST MOD 30 MIN: CPT | Mod: 25

## 2021-07-13 PROCEDURE — 92550 TYMPANOMETRY & REFLEX THRESH: CPT

## 2021-07-13 NOTE — PHYSICAL EXAM
[Midline] : trachea located in midline position [Normal] : no rashes [de-identified] : right appears normal, left bulging tm

## 2021-07-13 NOTE — HISTORY OF PRESENT ILLNESS
[de-identified] : Pt returns to the office to evaluate atrophic rhinitis and rhinorrhea. Pt completed CT scan of the sinuses; results are inconclusive regarding a skull base defect. Patient has a history of head trauma during childhood, he has been having a unilateral clear fluid rhinorrhea. \par CT images were reviewed in detail.  [FreeTextEntry1] : \par 7/13/21: Patient returns today c/o otalgia. Left ear pain ,  come and goes . Pain is mostly at night time . Ears are clogged. Denies any infections since last visit.  Denies any tinnitus.  \par Has episodes of bradycardia, heart rate goes down to 20's.  Dr Naga Garcia concerned it may be sleep apnea. Would like a possible sleep study. Does snore at night. \par \par Also still having rhinorrhea at times. uses azelastine with improvement.

## 2021-07-13 NOTE — ASSESSMENT
[FreeTextEntry1] : I reviewed, interpreted, and discussed the Audiogram done today. stable since 2019. Left CHL, stable. Patient a previous CT scan. Scope ok. \par \par I explained to the patient the pathophysiology of TMJ dysfunction, causing referred otalgia. I showed the impact of an  uneven bite/occlusion. \par During painful episodes, I recommended using slightly warm compresses to relieve the spasm of the masticators muscles, eating soft food, masticating on both sides of the jaw instead of one side,  in addition to using NSAIDs. I also explained the risks of side effects related to NSAIDs including stomach ulcers and recommended gastric protection while using NSAIDs. \par I also discussed the importance of seeing the dentist to align the bite to avoid a recurrence of the problem down the road.\par \par Written note from Dr Garcia suspecting sleep apnea reviewed.

## 2021-07-20 ENCOUNTER — APPOINTMENT (OUTPATIENT)
Dept: CARDIOLOGY | Facility: CLINIC | Age: 76
End: 2021-07-20

## 2021-08-02 ENCOUNTER — RX RENEWAL (OUTPATIENT)
Age: 76
End: 2021-08-02

## 2021-08-09 ENCOUNTER — RX RENEWAL (OUTPATIENT)
Age: 76
End: 2021-08-09

## 2021-09-01 ENCOUNTER — APPOINTMENT (OUTPATIENT)
Dept: CARDIOLOGY | Facility: CLINIC | Age: 76
End: 2021-09-01

## 2021-09-20 ENCOUNTER — OUTPATIENT (OUTPATIENT)
Dept: OUTPATIENT SERVICES | Facility: HOSPITAL | Age: 76
LOS: 1 days | Discharge: HOME | End: 2021-09-20

## 2021-09-20 ENCOUNTER — LABORATORY RESULT (OUTPATIENT)
Age: 76
End: 2021-09-20

## 2021-09-20 DIAGNOSIS — Z98.890 OTHER SPECIFIED POSTPROCEDURAL STATES: Chronic | ICD-10-CM

## 2021-09-20 DIAGNOSIS — Z11.59 ENCOUNTER FOR SCREENING FOR OTHER VIRAL DISEASES: ICD-10-CM

## 2021-09-23 ENCOUNTER — RESULT REVIEW (OUTPATIENT)
Age: 76
End: 2021-09-23

## 2021-09-23 ENCOUNTER — OUTPATIENT (OUTPATIENT)
Dept: OUTPATIENT SERVICES | Facility: HOSPITAL | Age: 76
LOS: 1 days | Discharge: HOME | End: 2021-09-23
Payer: MEDICARE

## 2021-09-23 DIAGNOSIS — Z98.890 OTHER SPECIFIED POSTPROCEDURAL STATES: Chronic | ICD-10-CM

## 2021-09-23 DIAGNOSIS — I25.10 ATHEROSCLEROTIC HEART DISEASE OF NATIVE CORONARY ARTERY WITHOUT ANGINA PECTORIS: ICD-10-CM

## 2021-09-23 DIAGNOSIS — I77.9 DISORDER OF ARTERIES AND ARTERIOLES, UNSPECIFIED: ICD-10-CM

## 2021-09-23 DIAGNOSIS — E78.5 HYPERLIPIDEMIA, UNSPECIFIED: ICD-10-CM

## 2021-09-23 DIAGNOSIS — E11.9 TYPE 2 DIABETES MELLITUS WITHOUT COMPLICATIONS: ICD-10-CM

## 2021-09-23 DIAGNOSIS — R42 DIZZINESS AND GIDDINESS: ICD-10-CM

## 2021-09-23 PROCEDURE — G1004: CPT

## 2021-09-23 PROCEDURE — 93018 CV STRESS TEST I&R ONLY: CPT

## 2021-09-23 PROCEDURE — 93016 CV STRESS TEST SUPVJ ONLY: CPT

## 2021-09-23 PROCEDURE — 78452 HT MUSCLE IMAGE SPECT MULT: CPT | Mod: 26,ME

## 2021-09-23 RX ORDER — REGADENOSON 0.08 MG/ML
0.4 INJECTION, SOLUTION INTRAVENOUS ONCE
Refills: 0 | Status: DISCONTINUED | OUTPATIENT
Start: 2021-09-23 | End: 2021-10-07

## 2021-10-18 ENCOUNTER — APPOINTMENT (OUTPATIENT)
Dept: CARDIOLOGY | Facility: CLINIC | Age: 76
End: 2021-10-18
Payer: MEDICARE

## 2021-10-18 VITALS
WEIGHT: 161 LBS | HEIGHT: 65 IN | BODY MASS INDEX: 26.82 KG/M2 | TEMPERATURE: 97.6 F | DIASTOLIC BLOOD PRESSURE: 80 MMHG | SYSTOLIC BLOOD PRESSURE: 133 MMHG

## 2021-10-18 VITALS — HEART RATE: 80 BPM

## 2021-10-18 PROCEDURE — 93000 ELECTROCARDIOGRAM COMPLETE: CPT

## 2021-10-18 PROCEDURE — 99214 OFFICE O/P EST MOD 30 MIN: CPT

## 2021-10-18 RX ORDER — IPRATROPIUM BROMIDE 21 UG/1
0.03 SPRAY NASAL
Qty: 90 | Refills: 0 | Status: DISCONTINUED | COMMUNITY
Start: 2017-06-07 | End: 2021-10-18

## 2021-10-18 NOTE — PHYSICAL EXAM
[General Appearance - Well Developed] : well developed [Normal Appearance] : normal appearance [Well Groomed] : well groomed [General Appearance - Well Nourished] : well nourished [No Deformities] : no deformities [General Appearance - In No Acute Distress] : no acute distress [Normal Conjunctiva] : the conjunctiva exhibited no abnormalities [Eyelids - No Xanthelasma] : the eyelids demonstrated no xanthelasmas [Normal Oral Mucosa] : normal oral mucosa [No Oral Pallor] : no oral pallor [No Oral Cyanosis] : no oral cyanosis [Respiration, Rhythm And Depth] : normal respiratory rhythm and effort [Auscultation Breath Sounds / Voice Sounds] : lungs were clear to auscultation bilaterally [Exaggerated Use Of Accessory Muscles For Inspiration] : no accessory muscle use [Heart Rate And Rhythm] : heart rate and rhythm were normal [Heart Sounds] : normal S1 and S2 [Murmurs] : no murmurs present [Abdomen Soft] : soft [Abdomen Tenderness] : non-tender [Abdomen Mass (___ Cm)] : no abdominal mass palpated [Abnormal Walk] : normal gait [Nail Clubbing] : no clubbing of the fingernails [Cyanosis, Localized] : no localized cyanosis [Petechial Hemorrhages (___cm)] : no petechial hemorrhages [] : no ischemic changes [Oriented To Time, Place, And Person] : oriented to person, place, and time [Mood] : the mood was normal [Affect] : the affect was normal [No Anxiety] : not feeling anxious [FreeTextEntry1] : No JVD

## 2021-10-18 NOTE — CARDIOLOGY SUMMARY
[___] : [unfilled] [de-identified] : 4- NSR with firt degree AV block IVCD rightward axis IVCD  [de-identified] : 3-2-2021 PVC's NS VT \par 7-2021 PVC's No VT . 12% PVC buden [de-identified] : 3- Lexiscan stress test inferior ischemia . \par 9- Mild apical ischemia .  [de-identified] : 4- Normal LV systolic function mild MR and Mild TR .  [de-identified] : 4- LAD 70% stenosis not signficnt by FFR  70% lesion int eh prox LCX and 95% lesion in the distal LCX and 70-80% proc RCA \par 3.x12 Xience stent BECCA in prox LCX \par 3.5x 33 Xience BECCA in the distal lesio  [de-identified] : 10-7-2019 Mild Carotid disease bilaterally .

## 2021-10-18 NOTE — HISTORY OF PRESENT ILLNESS
[FreeTextEntry1] : The patient had a cardiac cath performed. There was a 70 % lesion in the LAD which was not significant and severe proximal and distal LCX lesion . Had atherectomy and stent in the distal lesion and stent in the proximal lesion He has not had chest pain and feels well overall. He had een seen by Dr. Mcelroy for his VT and frequent PVC's . The patient had a repeat MCOT monitor which showed 12% PVC's . The patiient has 80% Proc RCA lesion which is heavily calcified . Nuclear stress showed  only mild apical ischemia which is improved compared to prior nuclear stress test prior to his intervention .

## 2021-10-18 NOTE — ASSESSMENT
[FreeTextEntry1] : The patient had 3 vessel CAD  . LAD is not significant by FFR . He had 2 BECCA placed in the LCX . The RCA may need further intervention but was a calcified lesion . The patient has not had chest pain  He is back on low dose beta blockers . The patient has not had chest pain and the degree of ischemia is less than pre PCI of the LCX . He has a 12% PVC burdon . He did not have VT on MCOT

## 2021-12-02 ENCOUNTER — APPOINTMENT (OUTPATIENT)
Dept: CARDIOLOGY | Facility: CLINIC | Age: 76
End: 2021-12-02
Payer: MEDICARE

## 2021-12-02 VITALS
HEIGHT: 63 IN | BODY MASS INDEX: 28 KG/M2 | TEMPERATURE: 98.1 F | HEART RATE: 77 BPM | WEIGHT: 158 LBS | SYSTOLIC BLOOD PRESSURE: 130 MMHG | DIASTOLIC BLOOD PRESSURE: 84 MMHG

## 2021-12-02 PROCEDURE — 93000 ELECTROCARDIOGRAM COMPLETE: CPT

## 2021-12-02 PROCEDURE — 99214 OFFICE O/P EST MOD 30 MIN: CPT

## 2021-12-02 NOTE — ASSESSMENT
[FreeTextEntry1] : NSVT - Patient has normal ejection fraction and no new CAD on recent catheterization.\par \par – Recommend continue beta blockers. \par – will repeat EM at next visit to evaluate PVC burden

## 2021-12-02 NOTE — HISTORY OF PRESENT ILLNESS
[FreeTextEntry1] : He had frequent PVC's up to 7% burden. He did have 1 4 beat run of VT. He was on Coreg in the past which had been stopped for IVCD with prolonged first degree AV block,The patient has CAD. The patient had an MI in 1998 and had POBA at the time. He has had an old IWMI. Characterization show no significant new CAD.\par \par \par The patient had a cardiac cath performed. There was a 70 % lesion in the LAD which was not significant and severe proximal and distal LCX lesion . Had atherectomy and stent in the distal lesion and stent in the proximal lesion He has not had chest pain and feels well overall. He had een seen by Dr. Mcelroy for his VT and frequent PVC's . The patient had a repeat MCOT monitor which showed 12% PVC's . The patiient has 80% Proc RCA lesion which is heavily calcified . Nuclear stress showed only mild apical ischemia which is improved compared to prior nuclear stress test prior to his intervention . \par  \par \par patient is feeling well. Denies palpitations and chest pain.\par \par \par EKG SR  1st degre AV block IVCD\par Event monitor – episode of NSVT and PVC [Syncope] : no syncope [Dizziness] : no dizziness [Erythema at Site] : no erythema at device site [Swelling at Site] : no swelling at device site

## 2022-01-21 ENCOUNTER — APPOINTMENT (OUTPATIENT)
Dept: CARDIOLOGY | Facility: CLINIC | Age: 77
End: 2022-01-21
Payer: MEDICARE

## 2022-01-21 VITALS — SYSTOLIC BLOOD PRESSURE: 124 MMHG | DIASTOLIC BLOOD PRESSURE: 64 MMHG | HEART RATE: 79 BPM

## 2022-01-21 VITALS — TEMPERATURE: 97.6 F | BODY MASS INDEX: 27.29 KG/M2 | HEIGHT: 63 IN | WEIGHT: 154 LBS

## 2022-01-21 PROCEDURE — 99214 OFFICE O/P EST MOD 30 MIN: CPT

## 2022-01-21 PROCEDURE — 93000 ELECTROCARDIOGRAM COMPLETE: CPT

## 2022-01-21 NOTE — CARDIOLOGY SUMMARY
[___] : [unfilled] [de-identified] : 4- NSR with firt degree AV block IVCD rightward axis IVCD  [de-identified] : 3-2-2021 PVC's NS VT \par 7-2021 PVC's No VT . 12% PVC buden [de-identified] : 3- Lexiscan stress test inferior ischemia . \par 9- Mild apical ischemia .  [de-identified] : 4- Normal LV systolic function mild MR and Mild TR .  [de-identified] : 4- LAD 70% stenosis not signficnt by FFR  70% lesion int eh prox LCX and 95% lesion in the distal LCX and 70-80% proc RCA \par 3.x12 Xience stent BECCA in prox LCX \par 3.5x 33 Xience BECCA in the distal lesio  [de-identified] : 10-7-2019 Mild Carotid disease bilaterally .

## 2022-01-21 NOTE — HISTORY OF PRESENT ILLNESS
[FreeTextEntry1] : The patient had a cardiac cath performed. There was a 70 % lesion in the LAD which was not significant and severe proximal and distal LCX lesion . Had atherectomy and stent in the distal lesion and stent in the proximal lesion He has not had chest pain and feels well overall. He had een seen by Dr. Mcelroy for his VT and frequent PVC's . The patient had a repeat MCOT monitor which showed 12% PVC's . The patiient has 80% Prox RCA lesion which is heavily calcified . Nuclear stress showed  only mild apical ischemia which is improved compared to prior nuclear stress test prior to his intervention . The patiet has not had chest pain amd he has not PVC's on reting ECG .

## 2022-01-21 NOTE — ASSESSMENT
[FreeTextEntry1] : The patient had 3 vessel CAD  . LAD is not significant by FFR . He had 2 BECCA placed in the LCX . The RCA may need further intervention but was a calcified lesion . The patient has not had chest pain  He is back on low dose beta blockers . The patient has not had chest pain and the degree of ischemia is less than pre PCI of the LCX . He has a 12% PVC burden . He did not have VT on MCOT . The patient has not had chest pain and he has no PVC's on his resting ECG . The patient has lost weight . The patient has had a recent CT scan of the abdomen and pelvis which was said to be ok

## 2022-04-19 ENCOUNTER — APPOINTMENT (OUTPATIENT)
Dept: CARDIOLOGY | Facility: CLINIC | Age: 77
End: 2022-04-19
Payer: MEDICARE

## 2022-04-19 VITALS
DIASTOLIC BLOOD PRESSURE: 68 MMHG | HEART RATE: 76 BPM | BODY MASS INDEX: 27.64 KG/M2 | WEIGHT: 156 LBS | HEIGHT: 63 IN | SYSTOLIC BLOOD PRESSURE: 130 MMHG | TEMPERATURE: 97.3 F

## 2022-04-19 DIAGNOSIS — H92.09 OTALGIA, UNSPECIFIED EAR: ICD-10-CM

## 2022-04-19 DIAGNOSIS — M79.89 OTHER SPECIFIED SOFT TISSUE DISORDERS: ICD-10-CM

## 2022-04-19 PROCEDURE — 99214 OFFICE O/P EST MOD 30 MIN: CPT

## 2022-04-19 PROCEDURE — 93000 ELECTROCARDIOGRAM COMPLETE: CPT

## 2022-04-19 RX ORDER — CHOLECALCIFEROL (VITAMIN D3) 125 MCG
TABLET ORAL
Refills: 0 | Status: DISCONTINUED | COMMUNITY
End: 2022-04-19

## 2022-04-19 RX ORDER — AZELASTINE HYDROCHLORIDE 0.5 MG/ML
0.05 SOLUTION/ DROPS OPHTHALMIC DAILY
Refills: 0 | Status: DISCONTINUED | COMMUNITY
End: 2022-04-19

## 2022-04-19 NOTE — ASSESSMENT
[FreeTextEntry1] : The patient has not had chest pain . He has 3 vessel CAD He has had PCI and BECCA in LCX and he has a hjavily calcified RCA with suspected significant lesion which is treated medically . The patient had improvement in the degree of ischemia in the apex after the last intervention . The patient is followed by Dr. Vogt for PVC's 12% burden and no VT on last monitor  .He does have lightheadedness when he stands up . The patient is going for Colonosocpy and EGD . He is stable for ischemia with intermedatie risk . If there is an unaccpetable bleeding risk may stop Brilinta for 5 days before the procedure but should conitnue ASA.  He may alos have to go for cataracat surgery . He is an intermediate risk undergoing a minoir risk procedure . He should conitnue Brilinta and ASA unless there is a contraindication , and may then stop Brilinta for 5 days before

## 2022-04-19 NOTE — REVIEW OF SYSTEMS
----- Message from Carol Valdivia sent at 4/4/2017  9:14 AM EDT -----  Regarding: surg clearance  Contact: [phone]  Libby from Dr garcia's- Pt having Surgery on may 8- needs surgery clearance     [Joint Pain] : joint pain [Negative] : Psychiatric

## 2022-04-19 NOTE — HISTORY OF PRESENT ILLNESS
[FreeTextEntry1] : The patient had a cardiac cath performed. There was a 70 % lesion in the LAD which was not significant and severe proximal and distal LCX lesion . Had atherectomy and stent in the distal lesion and stent in the proximal lesion He has not had chest pain and feels well overall. He had een seen by Dr. Mcelroy for his VT and frequent PVC's . The patient had a repeat MCOT monitor which showed 12% PVC's . The patiient has 80% Prox RCA lesion which is heavily calcified . Nuclear stress showed  only mild apical ischemia which is improved compared to prior nuclear stress test prior to his intervention . The patient has had some dizziness when bending down and picking something up .

## 2022-04-19 NOTE — CARDIOLOGY SUMMARY
[___] : [unfilled] [de-identified] : 4- NSR with firt degree AV block IVCD rightward axis IVCD \par 4-192022 NSR IVCD PVC's  [de-identified] : 3-2-2021 PVC's NS VT \par 7-2021 PVC's No VT . 12% PVC buden [de-identified] : 3- Lexiscan stress test inferior ischemia . \par 9- Mild apical ischemia .  [de-identified] : 4- Normal LV systolic function mild MR and Mild TR . \par 5-4-2021 EF 56% TraceMR trace TR mild AI  [de-identified] : 4- LAD 70% stenosis not signficnt by FFR  70% lesion int eh prox LCX and 95% lesion in the distal LCX and 70-80% proc RCA \par 3.x12 Xience stent BECCA in prox LCX \par 3.5x 33 Xience BECCA in the distal lesio  [de-identified] : 10-7-2019 Mild Carotid disease bilaterally .

## 2022-05-29 ENCOUNTER — NON-APPOINTMENT (OUTPATIENT)
Age: 77
End: 2022-05-29

## 2022-06-17 ENCOUNTER — APPOINTMENT (OUTPATIENT)
Dept: CARDIOLOGY | Facility: CLINIC | Age: 77
End: 2022-06-17
Payer: MEDICARE

## 2022-06-17 VITALS
HEART RATE: 67 BPM | HEIGHT: 63 IN | WEIGHT: 185 LBS | DIASTOLIC BLOOD PRESSURE: 70 MMHG | SYSTOLIC BLOOD PRESSURE: 112 MMHG | TEMPERATURE: 97.2 F | BODY MASS INDEX: 32.78 KG/M2

## 2022-06-17 PROCEDURE — 99214 OFFICE O/P EST MOD 30 MIN: CPT

## 2022-06-17 PROCEDURE — 93000 ELECTROCARDIOGRAM COMPLETE: CPT

## 2022-06-18 NOTE — ASSESSMENT
[FreeTextEntry1] : NSVT - Patient has normal ejection fraction and no new CAD on recent catheterization.\par \par – Recommend continue beta blockers. \par – Event monitor

## 2022-06-18 NOTE — HISTORY OF PRESENT ILLNESS
[FreeTextEntry1] : He had frequent PVC's up to 7% burden. He did have 1 4 beat run of VT. He was on Coreg in the past which had been stopped for IVCD with prolonged first degree AV block,The patient has CAD. The patient had an MI in 1998 and had POBA at the time. He has had an old IWMI. Characterization show no significant new CAD.\par \par \par The patient had a cardiac cath performed. There was a 70 % lesion in the LAD which was not significant and severe proximal and distal LCX lesion . Had atherectomy and stent in the distal lesion and stent in the proximal lesion He has not had chest pain and feels well overall. He had een seen by Dr. Mcelroy for his VT and frequent PVC's . The patient had a repeat MCOT monitor which showed 12% PVC's . The patiient has 80% Proc RCA lesion which is heavily calcified . Nuclear stress showed only mild apical ischemia which is improved compared to prior nuclear stress test prior to his intervention . \par  \par \par patient is feeling well. Denies palpitations and chest pain.\par \par \par EKG SR 67  1st degre AV block IVCD\par Event monitor – episode of NSVT and PVC [Syncope] : no syncope [Dizziness] : no dizziness [Erythema at Site] : no erythema at device site [Swelling at Site] : no swelling at device site

## 2022-07-15 ENCOUNTER — NON-APPOINTMENT (OUTPATIENT)
Age: 77
End: 2022-07-15

## 2022-07-25 ENCOUNTER — APPOINTMENT (OUTPATIENT)
Dept: OTOLARYNGOLOGY | Facility: CLINIC | Age: 77
End: 2022-07-25

## 2022-07-25 DIAGNOSIS — J30.9 ALLERGIC RHINITIS, UNSPECIFIED: ICD-10-CM

## 2022-07-25 DIAGNOSIS — G47.9 SLEEP DISORDER, UNSPECIFIED: ICD-10-CM

## 2022-07-25 PROCEDURE — 99214 OFFICE O/P EST MOD 30 MIN: CPT | Mod: 25

## 2022-07-25 PROCEDURE — 31231 NASAL ENDOSCOPY DX: CPT

## 2022-07-25 NOTE — PROCEDURE
[Flexible Endoscope] : examined with the flexible endoscope [Congested] : congested [Allergic] : allergic signs [Pale] : pale [Sinan] : sinan [Normal] : the septum showed no abnormalities

## 2022-07-25 NOTE — REASON FOR VISIT
[Subsequent Evaluation] : a subsequent evaluation for [FreeTextEntry2] : left ear pop ,  runny nose

## 2022-07-25 NOTE — HISTORY OF PRESENT ILLNESS
[FreeTextEntry1] : Patient presents today c/o runny nose, ear popping, throat irritated,  hoarse voice .  Patient states his nose has been a problem for some time it is constantly runny and especially when he is eating.   He also c/l the last 8 months occasionally ear popping and sensation of air blowing in his ear.  He also suffers from occasional irritated throat and hoarseness. Pt also has sleep issue and bradycardia. [Obstructive Sleep Apnea] : Obstructive Sleep Apnea [Frequent Nocturnal Awakening] : frequent nocturnal awakening [Daytime Somnolence] : daytime somnolence

## 2022-08-19 ENCOUNTER — APPOINTMENT (OUTPATIENT)
Dept: CARDIOLOGY | Facility: CLINIC | Age: 77
End: 2022-08-19

## 2022-08-19 PROCEDURE — 93306 TTE W/DOPPLER COMPLETE: CPT

## 2022-08-26 ENCOUNTER — APPOINTMENT (OUTPATIENT)
Dept: CARDIOLOGY | Facility: CLINIC | Age: 77
End: 2022-08-26

## 2022-08-26 VITALS
SYSTOLIC BLOOD PRESSURE: 120 MMHG | HEART RATE: 77 BPM | DIASTOLIC BLOOD PRESSURE: 65 MMHG | WEIGHT: 160 LBS | HEIGHT: 63 IN | TEMPERATURE: 98 F | RESPIRATION RATE: 16 BRPM | BODY MASS INDEX: 28.35 KG/M2

## 2022-08-26 PROCEDURE — 99214 OFFICE O/P EST MOD 30 MIN: CPT | Mod: 57,25

## 2022-08-26 PROCEDURE — 93000 ELECTROCARDIOGRAM COMPLETE: CPT

## 2022-09-07 ENCOUNTER — APPOINTMENT (OUTPATIENT)
Dept: CARDIOLOGY | Facility: CLINIC | Age: 77
End: 2022-09-07

## 2022-09-07 VITALS — HEART RATE: 64 BPM | SYSTOLIC BLOOD PRESSURE: 130 MMHG | DIASTOLIC BLOOD PRESSURE: 80 MMHG

## 2022-09-07 VITALS — HEIGHT: 63 IN | WEIGHT: 164 LBS | BODY MASS INDEX: 29.06 KG/M2 | TEMPERATURE: 97.6 F

## 2022-09-07 PROCEDURE — 99214 OFFICE O/P EST MOD 30 MIN: CPT | Mod: 25

## 2022-09-07 PROCEDURE — 93000 ELECTROCARDIOGRAM COMPLETE: CPT

## 2022-09-07 RX ORDER — ALBUTEROL SULFATE 90 UG/1
108 (90 BASE) AEROSOL, METERED RESPIRATORY (INHALATION)
Qty: 1 | Refills: 0 | Status: DISCONTINUED | COMMUNITY
Start: 2017-08-08 | End: 2022-09-07

## 2022-09-07 NOTE — ASSESSMENT
[FreeTextEntry1] : The patient has not had chest pain . He has 3 vessel CAD He has had PCI and BECCA in LCX and he has a heavily calcified RCA with suspected significant lesion which is treated medically . The patient had improvement in the degree of ischemia in the apex after the last intervention . The patient is followed by Dr. Vogt for PVC's 14% burden and no VT on last monitor  .He saw Dr. Donato and he may have an ILM for Mobitz I and perids of bradycardia . He has fleeting lightheadedness . . Echo showed normal LV systolic function

## 2022-09-07 NOTE — HISTORY OF PRESENT ILLNESS
[FreeTextEntry1] : The patient had a cardiac cath performed. There was a 70 % lesion in the LAD which was not significant and severe proximal and distal LCX lesion . Had atherectomy and stent in the distal lesion and stent in the proximal lesion He has not had chest pain and feels well overall. He had een seen by Dr. Mcelroy for his VT and frequent PVC's . . The patiient has 80% Prox RCA lesion which is heavily calcified . Nuclear stress showed  only mild apical ischemia which is improved compared to prior nuclear stress test prior to his intervention . The patient has had some dizziness when bending down and picking something up . The patient had another MCOT by Dr. Mcelroy and was told of having Mobitz I and he had suggest to reimplant his loop monitor . There was a 14% PVC burden but no VT .

## 2022-09-07 NOTE — REASON FOR VISIT
[Symptom and Test Evaluation] : symptom and test evaluation [Other: ____] : [unfilled] [Family Member] : family member [FreeTextEntry3] : Dr. Canada  [Follow-Up - Clinic] : a clinic follow-up of [Coronary Artery Disease] : coronary artery disease [Hyperlipidemia] : hyperlipidemia [Hypertension] : hypertension [Medication Management] : Medication management [FreeTextEntry1] : dizziness

## 2022-09-07 NOTE — CARDIOLOGY SUMMARY
[de-identified] : 4- NSR with firt degree AV block IVCD rightward axis IVCD \par 4-192022 NSR IVCD PVC's  [de-identified] : 3-2-2021 PVC's NS VT \par 7-2021 PVC's No VT . 12% PVC buden [de-identified] : 3- Lexiscan stress test inferior ischemia . \par 9- Mild apical ischemia .  [de-identified] : 4- Normal LV systolic function mild MR and Mild TR . \par 5-4-2021 EF 56% TraceMR trace TR mild AI \par 8- Normal LV systolic function Trace MR mild TR mild enlargement of the sinus of Valsalva and ascending aorta  [de-identified] : 4- LAD 70% stenosis not signficnt by FFR  70% lesion int eh prox LCX and 95% lesion in the distal LCX and 70-80% proc RCA \par 3.x12 Xience stent BECCA in prox LCX \par 3.5x 33 Xience BECCA in the distal lesion  [de-identified] : 10-7-2019 Mild Carotid disease bilaterally .  [___] : [unfilled]

## 2022-09-07 NOTE — PHYSICAL EXAM
[General Appearance - Well Developed] : well developed [Normal Appearance] : normal appearance [Well Groomed] : well groomed [General Appearance - Well Nourished] : well nourished [No Deformities] : no deformities [General Appearance - In No Acute Distress] : no acute distress [Normal Conjunctiva] : the conjunctiva exhibited no abnormalities [Eyelids - No Xanthelasma] : the eyelids demonstrated no xanthelasmas [Normal Oral Mucosa] : normal oral mucosa [No Oral Pallor] : no oral pallor [No Oral Cyanosis] : no oral cyanosis [FreeTextEntry1] : No JVD [Respiration, Rhythm And Depth] : normal respiratory rhythm and effort [Exaggerated Use Of Accessory Muscles For Inspiration] : no accessory muscle use [Auscultation Breath Sounds / Voice Sounds] : lungs were clear to auscultation bilaterally [Heart Rate And Rhythm] : heart rate and rhythm were normal [Heart Sounds] : normal S1 and S2 [Murmurs] : no murmurs present [Abdomen Soft] : soft [Abdomen Tenderness] : non-tender [Abdomen Mass (___ Cm)] : no abdominal mass palpated [Abnormal Walk] : normal gait [Nail Clubbing] : no clubbing of the fingernails [Cyanosis, Localized] : no localized cyanosis [Petechial Hemorrhages (___cm)] : no petechial hemorrhages [] : no ischemic changes [Oriented To Time, Place, And Person] : oriented to person, place, and time [Affect] : the affect was normal [Mood] : the mood was normal [No Anxiety] : not feeling anxious

## 2022-09-28 ENCOUNTER — OUTPATIENT (OUTPATIENT)
Dept: OUTPATIENT SERVICES | Facility: HOSPITAL | Age: 77
LOS: 1 days | Discharge: HOME | End: 2022-09-28

## 2022-09-28 DIAGNOSIS — Z98.890 OTHER SPECIFIED POSTPROCEDURAL STATES: Chronic | ICD-10-CM

## 2022-09-28 DIAGNOSIS — I44.0 ATRIOVENTRICULAR BLOCK, FIRST DEGREE: ICD-10-CM

## 2022-09-28 LAB — GLUCOSE BLDC GLUCOMTR-MCNC: 104 MG/DL — HIGH (ref 70–99)

## 2022-09-28 PROCEDURE — 33285 INSJ SUBQ CAR RHYTHM MNTR: CPT

## 2022-09-28 NOTE — ASSESSMENT
[FreeTextEntry1] : conduction disease\par - WB during night with conduction \par - loop implant - risk an befits explained \par

## 2022-09-28 NOTE — HISTORY OF PRESENT ILLNESS
[FreeTextEntry1] : He had frequent PVC's up to 7% burden. He did have 1 4 beat run of VT. He was on Coreg in the past which had been stopped for IVCD with prolonged first degree AV block,The patient has CAD. The patient had an MI in 1998 and had POBA at the time. He has had an old IWMI. Characterization show no significant new CAD.\par \par \par The patient had a cardiac cath performed. There was a 70 % lesion in the LAD which was not significant and severe proximal and distal LCX lesion . Had atherectomy and stent in the distal lesion and stent in the proximal lesion He has not had chest pain and feels well overall. He had een seen by Dr. Mcelroy for his VT and frequent PVC's . The patient had a repeat MCOT monitor which showed 12% PVC's . The patiient has 80% Proc RCA lesion which is heavily calcified . Nuclear stress showed only mild apical ischemia which is improved compared to prior nuclear stress test prior to his intervention . \par  \par \par Patient feels some palpitations \par \par EKG SR 77 bpm  1st degre AV block IVCD\par \par \par echo - EF 57%, mil AI\par Event monitor - WB all durignthe night while sleeping \par Event monitor – episode of NSVT and PVC [Syncope] : no syncope [Dizziness] : no dizziness [Erythema at Site] : no erythema at device site [Swelling at Site] : no swelling at device site

## 2022-09-30 DIAGNOSIS — R00.2 PALPITATIONS: ICD-10-CM

## 2022-10-24 ENCOUNTER — APPOINTMENT (OUTPATIENT)
Dept: SLEEP CENTER | Facility: HOSPITAL | Age: 77
End: 2022-10-24

## 2022-10-24 ENCOUNTER — OUTPATIENT (OUTPATIENT)
Dept: OUTPATIENT SERVICES | Facility: HOSPITAL | Age: 77
LOS: 1 days | Discharge: HOME | End: 2022-10-24

## 2022-10-24 DIAGNOSIS — Z98.890 OTHER SPECIFIED POSTPROCEDURAL STATES: Chronic | ICD-10-CM

## 2022-10-24 PROCEDURE — 95810 POLYSOM 6/> YRS 4/> PARAM: CPT | Mod: 26

## 2022-10-25 DIAGNOSIS — G47.33 OBSTRUCTIVE SLEEP APNEA (ADULT) (PEDIATRIC): ICD-10-CM

## 2022-10-26 ENCOUNTER — APPOINTMENT (OUTPATIENT)
Dept: CARDIOLOGY | Facility: CLINIC | Age: 77
End: 2022-10-26

## 2022-10-26 VITALS
TEMPERATURE: 98 F | WEIGHT: 165 LBS | HEIGHT: 63 IN | HEART RATE: 98 BPM | DIASTOLIC BLOOD PRESSURE: 67 MMHG | BODY MASS INDEX: 29.23 KG/M2 | SYSTOLIC BLOOD PRESSURE: 112 MMHG | RESPIRATION RATE: 16 BRPM

## 2022-10-26 PROCEDURE — 93000 ELECTROCARDIOGRAM COMPLETE: CPT | Mod: 59

## 2022-10-26 PROCEDURE — 93291 INTERROG DEV EVAL SCRMS IP: CPT

## 2022-10-26 PROCEDURE — 99213 OFFICE O/P EST LOW 20 MIN: CPT | Mod: 25

## 2022-10-26 RX ORDER — BLOOD SUGAR DIAGNOSTIC
STRIP MISCELLANEOUS
Qty: 100 | Refills: 0 | Status: DISCONTINUED | COMMUNITY
Start: 2017-08-04 | End: 2022-10-26

## 2022-10-30 NOTE — ASSESSMENT
[FreeTextEntry1] : Cardiac h/o CAD, MI, PCI with stent in 2021, frequent PVC, complaints of palpitations. s/p ILR implant for long term arrhythmia monitoring\par \par Left para sternal incision has healed well\par \par Device interrogation showed 8% of PVC\par \par Enrolled on remote monitoring.\par Discussed with the  schedule and process of remote monitoring,presence and  availability of remote team.I also mentioned that device doesn't monitor if patient is having a heart attack, if they have symptoms/cardiac event they should not wait for a call from remote team, but proceed to ED.\par I added that they will received bills for remote monitoring for co-payment that is not covered by their insurance.\par \par Continue current med regimen\par \par RTO in 6 months

## 2022-10-30 NOTE — HISTORY OF PRESENT ILLNESS
[FreeTextEntry1] : He had frequent PVC's up to 7% burden. He did have 1 4 beat run of VT. He was on Coreg in the past which had been stopped for IVCD with prolonged first degree AV block,The patient has CAD. The patient had an MI in 1998 and had POBA at the time. He has had an old IWMI. Characterization show no significant new CAD.\par \par \par The patient had a cardiac cath performed. There was a 70 % lesion in the LAD which was not significant and severe proximal and distal LCX lesion . Had atherectomy and stent in the distal lesion and stent in the proximal lesion He has not had chest pain and feels well overall. He had seen by Dr. Mcelroy for his VT and frequent PVC's . The patient had a repeat MCOT monitor which showed 12% PVC's . The patient has 80% Proc RCA lesion which is heavily calcified . Nuclear stress showed only mild apical ischemia which is improved compared to prior nuclear stress test prior to his intervention . \par  \par \par Patient feels some palpitations, Had an event monitor that showed nocturnal WB as well as NSVT and PVC's\par An ILR was implanted on 9/28/2022 for long term arrhythmia monitoring.\par \par Cardiologist: Dr. Garcia\par PCP: Dr. Canada\par \par \par \par

## 2022-10-30 NOTE — PHYSICAL EXAM
[General Appearance - Well Developed] : well developed [Normal Appearance] : normal appearance [Well Groomed] : well groomed [General Appearance - Well Nourished] : well nourished [No Deformities] : no deformities [General Appearance - In No Acute Distress] : no acute distress [Heart Rate And Rhythm] : heart rate and rhythm were normal [Heart Sounds] : normal S1 and S2 [Murmurs] : no murmurs present [Respiration, Rhythm And Depth] : normal respiratory rhythm and effort [Exaggerated Use Of Accessory Muscles For Inspiration] : no accessory muscle use [Auscultation Breath Sounds / Voice Sounds] : lungs were clear to auscultation bilaterally [Clean] : clean [Dry] : dry [Well-Healed] : well-healed [Abdomen Soft] : soft [Abdomen Tenderness] : non-tender [Abdomen Mass (___ Cm)] : no abdominal mass palpated [Nail Clubbing] : no clubbing of the fingernails [Cyanosis, Localized] : no localized cyanosis [Petechial Hemorrhages (___cm)] : no petechial hemorrhages [] : no ischemic changes [FreeTextEntry1] : Left parasternal

## 2022-10-30 NOTE — CARDIOLOGY SUMMARY
[de-identified] : 4- NSR with firt degree AV block IVCD rightward axis IVCD \par 4-192022 NSR IVCD PVC's \par 8/26/2022 NSR 77 bpm  1st degre AV block IVCD\par 10/26/2022 NSR with 1st degree AVB , AR 316ms left posterior Fascicular block, QRS 134ms occ PVC [de-identified] : 3-2-2021 PVC's NS VT \par 7-2021 PVC's No VT . 12% PVC buden [de-identified] : 3- Lexiscan stress test inferior ischemia . \par 9- Mild apical ischemia .  [de-identified] : 4- Normal LV systolic function mild MR and Mild TR . \par 5-4-2021 EF 56% TraceMR trace TR mild AI \par 8- Normal LV systolic function Trace MR mild TR mild enlargement of the sinus of Valsalva and ascending aorta  [de-identified] : 4- LAD 70% stenosis not signficnt by FFR  70% lesion int eh prox LCX and 95% lesion in the distal LCX and 70-80% proc RCA \par 3.x12 Xience stent BECCA in prox LCX \par 3.5x 33 Xience BECAC in the distal lesion  [de-identified] : 10-7-2019 Mild Carotid disease bilaterally .

## 2022-10-30 NOTE — PROCEDURE
[No] : not [NSR] : normal sinus rhythm [Longevity: ___ months] : The estimated remaining battery life is [unfilled] months [Sensing Amplitude ___mv] : sensing amplitude was [unfilled] mv [de-identified] : SAYDA IVERSON [de-identified] : MARC [de-identified] : [id number] [de-identified] : 9/28/2022 [de-identified] : PVC -8% [de-identified] : Good

## 2022-11-28 ENCOUNTER — APPOINTMENT (OUTPATIENT)
Dept: UROLOGY | Facility: CLINIC | Age: 77
End: 2022-11-28

## 2022-11-28 VITALS
OXYGEN SATURATION: 98 % | RESPIRATION RATE: 18 BRPM | TEMPERATURE: 97.6 F | HEART RATE: 75 BPM | DIASTOLIC BLOOD PRESSURE: 70 MMHG | WEIGHT: 165 LBS | BODY MASS INDEX: 28.17 KG/M2 | HEIGHT: 64 IN | SYSTOLIC BLOOD PRESSURE: 140 MMHG

## 2022-11-28 DIAGNOSIS — Z00.00 ENCOUNTER FOR GENERAL ADULT MEDICAL EXAMINATION W/OUT ABNORMAL FINDINGS: ICD-10-CM

## 2022-11-28 DIAGNOSIS — Z86.39 PERSONAL HISTORY OF OTHER ENDOCRINE, NUTRITIONAL AND METABOLIC DISEASE: ICD-10-CM

## 2022-11-28 DIAGNOSIS — Z86.79 PERSONAL HISTORY OF OTHER DISEASES OF THE CIRCULATORY SYSTEM: ICD-10-CM

## 2022-11-28 DIAGNOSIS — Z83.3 FAMILY HISTORY OF DIABETES MELLITUS: ICD-10-CM

## 2022-11-28 DIAGNOSIS — Z86.2 PERSONAL HISTORY OF DISEASES OF THE BLOOD AND BLOOD-FORMING ORGANS AND CERTAIN DISORDERS INVOLVING THE IMMUNE MECHANISM: ICD-10-CM

## 2022-11-28 PROCEDURE — 99204 OFFICE O/P NEW MOD 45 MIN: CPT

## 2022-11-28 NOTE — ASSESSMENT
[FreeTextEntry1] : JAMES SECOTO is a 77 year old male with a hx of CAD with stents , DM , HTN who presents for consultation for bladder wall thickening, BPH\par Patient is currently asymptomatic without any lower urinary tract symptoms.  Bladder wall minimally thickened on my read w distended bladder and symmetric.  No bladder tumors.\par Urinalysis negative and PSA low risk for prostate cancer.\par Watchful waiting of BPH.  He will inform us if his symptoms worsen\par Recommended follow-up w his PCP, possibly nephrology for his worsening CKD, proteinuria

## 2022-11-28 NOTE — ADDENDUM
[FreeTextEntry1] : Patient's note was transcribed with the assistance of a medical scribe under the supervision of Dr. Castellon.\par I, Dr. Castellon, have reviewed the patient's chart and agree that it aligns with my medical decisions.\par Hoda Salinas, our scribe, also served as a chaperone for physical examination purposes.\par \par \par

## 2022-11-28 NOTE — HISTORY OF PRESENT ILLNESS
[FreeTextEntry1] : JAMES ESCOTO is a 77 year old male with a hx of CAD with stents , DM , HTN who presents for consultation for bladder wall thickening, BPH\par \par Pt denies any obstructive or irritative urinary symptoms.  Voids a few times during the day and perhaps nocturia 1 time. He denies gross hematuria, dysuria or associated symptoms.  Referred by primary care to evaluate ultrasound findings.\par \par RBUS 09/2022 images visualized  - No hydronephrosis, shadowing calculi or perinephric fluid. Mild thickening or trabeculation along bladder wall  but no lesion seen. There are prostatic calcifications . Prostate is enlarged , measuring about 72 cm  .\par On my read bladder wall thickness 0.19 cm with bladder distended.  No masses seen.\par No intravesical protrusion of the prostate.\par \par Labs 09/2022 : creatinine 1.69 //GFR 42 //A1c - 6.6 \par PSA 09/22 : 0.51 \par U/A 08/22 - proteinuria , no microscopic hematuria or pyuria \par \par Denies  PMH including previous kidney stones, recurrent UTIs. \par Family History: No  malignancies\par Social History: pt from [place] speaks english , daughter is IM hospitalist at [employer] \par \par Old notes reviewed:\par reviewed medication list , not on any diuretics , on DM medication Janumet

## 2022-11-30 ENCOUNTER — APPOINTMENT (OUTPATIENT)
Dept: CARDIOLOGY | Facility: CLINIC | Age: 77
End: 2022-11-30

## 2022-12-01 ENCOUNTER — NON-APPOINTMENT (OUTPATIENT)
Age: 77
End: 2022-12-01

## 2022-12-01 LAB
BILIRUB UR QL STRIP: NORMAL
COLLECTION METHOD: NORMAL
GLUCOSE UR-MCNC: NORMAL
HCG UR QL: 0.2 EU/DL
HGB UR QL STRIP.AUTO: NORMAL
KETONES UR-MCNC: NORMAL
LEUKOCYTE ESTERASE UR QL STRIP: NORMAL
NITRITE UR QL STRIP: NORMAL
PH UR STRIP: 5.5
PROT UR STRIP-MCNC: 30
SP GR UR STRIP: 1.03

## 2023-01-04 ENCOUNTER — APPOINTMENT (OUTPATIENT)
Dept: CARDIOLOGY | Facility: CLINIC | Age: 78
End: 2023-01-04
Payer: MEDICARE

## 2023-01-04 VITALS — HEIGHT: 64 IN | WEIGHT: 164 LBS | BODY MASS INDEX: 28 KG/M2 | TEMPERATURE: 97.6 F

## 2023-01-04 VITALS — HEART RATE: 79 BPM | SYSTOLIC BLOOD PRESSURE: 128 MMHG | DIASTOLIC BLOOD PRESSURE: 70 MMHG

## 2023-01-04 DIAGNOSIS — I77.9 DISORDER OF ARTERIES AND ARTERIOLES, UNSPECIFIED: ICD-10-CM

## 2023-01-04 PROCEDURE — 99214 OFFICE O/P EST MOD 30 MIN: CPT | Mod: 25

## 2023-01-04 PROCEDURE — 93000 ELECTROCARDIOGRAM COMPLETE: CPT

## 2023-01-04 NOTE — CARDIOLOGY SUMMARY
[___] : [unfilled] [de-identified] : 4- NSR with firt degree AV block IVCD rightward axis IVCD \par 4-192022 NSR IVCD PVC's \par 1-4-2022 NSR first degree AV block IVCD rightward axis . PVC .  [de-identified] : 3-2-2021 PVC's NS VT \par 7-2021 PVC's No VT . 12% PVC buden [de-identified] : 3- Lexiscan stress test inferior ischemia . \par 9- Mild apical ischemia .  [de-identified] : 4- Normal LV systolic function mild MR and Mild TR . \par 5-4-2021 EF 56% TraceMR trace TR mild AI \par 8- Normal LV systolic function Trace MR mild TR mild enlargement of the sinus of Valsalva and ascending aorta  [de-identified] : 4- LAD 70% stenosis not signficnt by FFR  70% lesion int eh prox LCX and 95% lesion in the distal LCX and 70-80% proc RCA \par 3.x12 Xience stent BECCA in prox LCX \par 3.5x 33 Xience BECCA in the distal lesion  [de-identified] : 10-7-2019 Mild Carotid disease bilaterally .

## 2023-01-04 NOTE — ASSESSMENT
[FreeTextEntry1] : The patient has not had chest pain . He has 3 vessel CAD He has had PCI and BECCA in LCX and he has a heavily calcified RCA with suspected significant lesion which is treated medically . The patient had improvement in the degree of ischemia in the apex after the last intervention . The patient is followed by Dr. Vogt for PVC's 14% burden and no VT on last monitor  .He saw Dr. Donato and had an ILM implanted .  . . Echo showed normal LV systolic function . I had spoken to daughter and she feels he has remained unchanged .

## 2023-01-04 NOTE — HISTORY OF PRESENT ILLNESS
[FreeTextEntry1] : The patient had a cardiac cath performed. There was a 70 % lesion in the LAD which was not significant and severe proximal and distal LCX lesion . Had atherectomy and stent in the distal lesion and stent in the proximal lesion He has not had chest pain and feels well overall. He had een seen by Dr. Mcelroy for his VT and frequent PVC's . . The patiient has 80% Prox RCA lesion which is heavily calcified . Nuclear stress showed  only mild apical ischemia which is improved compared to prior nuclear stress test prior to his intervention . The patient has had some dizziness when bending down and picking something up . The patient had another MCOT by Dr. Mcelroy and was told of having Mobitz I and he had suggest to reimplant his loop monitor . There was a 14% PVC burden but no VT . \par The patient has had left arm pain . only at rest and it is usually improved after he rubs his arm .

## 2023-01-11 ENCOUNTER — NON-APPOINTMENT (OUTPATIENT)
Age: 78
End: 2023-01-11

## 2023-01-11 ENCOUNTER — APPOINTMENT (OUTPATIENT)
Dept: CARDIOLOGY | Facility: CLINIC | Age: 78
End: 2023-01-11
Payer: MEDICARE

## 2023-01-11 PROCEDURE — 93298 REM INTERROG DEV EVAL SCRMS: CPT

## 2023-01-11 PROCEDURE — G2066: CPT

## 2023-01-24 ENCOUNTER — RX RENEWAL (OUTPATIENT)
Age: 78
End: 2023-01-24

## 2023-01-27 ENCOUNTER — INPATIENT (INPATIENT)
Facility: HOSPITAL | Age: 78
LOS: 0 days | Discharge: HOME | End: 2023-01-28
Attending: HOSPITALIST | Admitting: HOSPITALIST
Payer: MEDICARE

## 2023-01-27 ENCOUNTER — NON-APPOINTMENT (OUTPATIENT)
Age: 78
End: 2023-01-27

## 2023-01-27 VITALS
HEART RATE: 74 BPM | RESPIRATION RATE: 18 BRPM | SYSTOLIC BLOOD PRESSURE: 145 MMHG | DIASTOLIC BLOOD PRESSURE: 65 MMHG | TEMPERATURE: 98 F | OXYGEN SATURATION: 98 %

## 2023-01-27 DIAGNOSIS — Z98.890 OTHER SPECIFIED POSTPROCEDURAL STATES: Chronic | ICD-10-CM

## 2023-01-27 LAB
ALBUMIN SERPL ELPH-MCNC: 3.5 G/DL — SIGNIFICANT CHANGE UP (ref 3.5–5.2)
ALP SERPL-CCNC: 38 U/L — SIGNIFICANT CHANGE UP (ref 30–115)
ALT FLD-CCNC: 13 U/L — SIGNIFICANT CHANGE UP (ref 0–41)
ANION GAP SERPL CALC-SCNC: 12 MMOL/L — SIGNIFICANT CHANGE UP (ref 7–14)
AST SERPL-CCNC: 31 U/L — SIGNIFICANT CHANGE UP (ref 0–41)
BASOPHILS # BLD AUTO: 0.02 K/UL — SIGNIFICANT CHANGE UP (ref 0–0.2)
BASOPHILS NFR BLD AUTO: 0.3 % — SIGNIFICANT CHANGE UP (ref 0–1)
BILIRUB SERPL-MCNC: 0.3 MG/DL — SIGNIFICANT CHANGE UP (ref 0.2–1.2)
BUN SERPL-MCNC: 22 MG/DL — HIGH (ref 10–20)
CALCIUM SERPL-MCNC: 8.9 MG/DL — SIGNIFICANT CHANGE UP (ref 8.4–10.4)
CHLORIDE SERPL-SCNC: 106 MMOL/L — SIGNIFICANT CHANGE UP (ref 98–110)
CO2 SERPL-SCNC: 22 MMOL/L — SIGNIFICANT CHANGE UP (ref 17–32)
CREAT SERPL-MCNC: 1.4 MG/DL — SIGNIFICANT CHANGE UP (ref 0.7–1.5)
EGFR: 52 ML/MIN/1.73M2 — LOW
EOSINOPHIL # BLD AUTO: 0.13 K/UL — SIGNIFICANT CHANGE UP (ref 0–0.7)
EOSINOPHIL NFR BLD AUTO: 2.2 % — SIGNIFICANT CHANGE UP (ref 0–8)
GLUCOSE SERPL-MCNC: 116 MG/DL — HIGH (ref 70–99)
HCT VFR BLD CALC: 31.5 % — LOW (ref 42–52)
HGB BLD-MCNC: 10 G/DL — LOW (ref 14–18)
IMM GRANULOCYTES NFR BLD AUTO: 0.3 % — SIGNIFICANT CHANGE UP (ref 0.1–0.3)
LIDOCAIN IGE QN: 55 U/L — SIGNIFICANT CHANGE UP (ref 7–60)
LYMPHOCYTES # BLD AUTO: 1.28 K/UL — SIGNIFICANT CHANGE UP (ref 1.2–3.4)
LYMPHOCYTES # BLD AUTO: 21.9 % — SIGNIFICANT CHANGE UP (ref 20.5–51.1)
MAGNESIUM SERPL-MCNC: 1.4 MG/DL — LOW (ref 1.8–2.4)
MCHC RBC-ENTMCNC: 27.9 PG — SIGNIFICANT CHANGE UP (ref 27–31)
MCHC RBC-ENTMCNC: 31.7 G/DL — LOW (ref 32–37)
MCV RBC AUTO: 88 FL — SIGNIFICANT CHANGE UP (ref 80–94)
MONOCYTES # BLD AUTO: 0.54 K/UL — SIGNIFICANT CHANGE UP (ref 0.1–0.6)
MONOCYTES NFR BLD AUTO: 9.2 % — SIGNIFICANT CHANGE UP (ref 1.7–9.3)
NEUTROPHILS # BLD AUTO: 3.85 K/UL — SIGNIFICANT CHANGE UP (ref 1.4–6.5)
NEUTROPHILS NFR BLD AUTO: 66.1 % — SIGNIFICANT CHANGE UP (ref 42.2–75.2)
NRBC # BLD: 0 /100 WBCS — SIGNIFICANT CHANGE UP (ref 0–0)
NT-PROBNP SERPL-SCNC: 1688 PG/ML — HIGH (ref 0–300)
PLATELET # BLD AUTO: 258 K/UL — SIGNIFICANT CHANGE UP (ref 130–400)
POTASSIUM SERPL-MCNC: 4.9 MMOL/L — SIGNIFICANT CHANGE UP (ref 3.5–5)
POTASSIUM SERPL-SCNC: 4.9 MMOL/L — SIGNIFICANT CHANGE UP (ref 3.5–5)
PROT SERPL-MCNC: 6.4 G/DL — SIGNIFICANT CHANGE UP (ref 6–8)
RBC # BLD: 3.58 M/UL — LOW (ref 4.7–6.1)
RBC # FLD: 15 % — HIGH (ref 11.5–14.5)
SODIUM SERPL-SCNC: 140 MMOL/L — SIGNIFICANT CHANGE UP (ref 135–146)
TROPONIN T SERPL-MCNC: <0.01 NG/ML — SIGNIFICANT CHANGE UP
WBC # BLD: 5.84 K/UL — SIGNIFICANT CHANGE UP (ref 4.8–10.8)
WBC # FLD AUTO: 5.84 K/UL — SIGNIFICANT CHANGE UP (ref 4.8–10.8)

## 2023-01-27 PROCEDURE — 99223 1ST HOSP IP/OBS HIGH 75: CPT

## 2023-01-27 PROCEDURE — 99285 EMERGENCY DEPT VISIT HI MDM: CPT

## 2023-01-27 PROCEDURE — 93010 ELECTROCARDIOGRAM REPORT: CPT

## 2023-01-27 RX ORDER — FERROUS SULFATE 325(65) MG
325 TABLET ORAL DAILY
Refills: 0 | Status: DISCONTINUED | OUTPATIENT
Start: 2023-01-27 | End: 2023-01-28

## 2023-01-27 RX ORDER — FENOFIBRATE,MICRONIZED 130 MG
145 CAPSULE ORAL DAILY
Refills: 0 | Status: DISCONTINUED | OUTPATIENT
Start: 2023-01-27 | End: 2023-01-28

## 2023-01-27 RX ORDER — LISINOPRIL 2.5 MG/1
10 TABLET ORAL DAILY
Refills: 0 | Status: DISCONTINUED | OUTPATIENT
Start: 2023-01-27 | End: 2023-01-28

## 2023-01-27 RX ORDER — ASCORBIC ACID 60 MG
500 TABLET,CHEWABLE ORAL DAILY
Refills: 0 | Status: DISCONTINUED | OUTPATIENT
Start: 2023-01-27 | End: 2023-01-28

## 2023-01-27 RX ORDER — PANTOPRAZOLE SODIUM 20 MG/1
40 TABLET, DELAYED RELEASE ORAL
Refills: 0 | Status: DISCONTINUED | OUTPATIENT
Start: 2023-01-27 | End: 2023-01-28

## 2023-01-27 RX ORDER — TICAGRELOR 90 MG/1
60 TABLET ORAL EVERY 12 HOURS
Refills: 0 | Status: DISCONTINUED | OUTPATIENT
Start: 2023-01-27 | End: 2023-01-28

## 2023-01-27 RX ORDER — ALBUTEROL 90 UG/1
2 AEROSOL, METERED ORAL EVERY 6 HOURS
Refills: 0 | Status: DISCONTINUED | OUTPATIENT
Start: 2023-01-27 | End: 2023-01-28

## 2023-01-27 RX ORDER — PREGABALIN 225 MG/1
1000 CAPSULE ORAL DAILY
Refills: 0 | Status: DISCONTINUED | OUTPATIENT
Start: 2023-01-27 | End: 2023-01-28

## 2023-01-27 RX ORDER — ASPIRIN/CALCIUM CARB/MAGNESIUM 324 MG
81 TABLET ORAL DAILY
Refills: 0 | Status: DISCONTINUED | OUTPATIENT
Start: 2023-01-27 | End: 2023-01-28

## 2023-01-27 RX ORDER — MAGNESIUM SULFATE 500 MG/ML
2 VIAL (ML) INJECTION ONCE
Refills: 0 | Status: COMPLETED | OUTPATIENT
Start: 2023-01-27 | End: 2023-01-27

## 2023-01-27 RX ORDER — ASPIRIN/CALCIUM CARB/MAGNESIUM 324 MG
162 TABLET ORAL ONCE
Refills: 0 | Status: COMPLETED | OUTPATIENT
Start: 2023-01-27 | End: 2023-01-27

## 2023-01-27 RX ORDER — CARVEDILOL PHOSPHATE 80 MG/1
3.12 CAPSULE, EXTENDED RELEASE ORAL EVERY 12 HOURS
Refills: 0 | Status: DISCONTINUED | OUTPATIENT
Start: 2023-01-27 | End: 2023-01-28

## 2023-01-27 RX ORDER — MONTELUKAST 4 MG/1
10 TABLET, CHEWABLE ORAL DAILY
Refills: 0 | Status: DISCONTINUED | OUTPATIENT
Start: 2023-01-27 | End: 2023-01-28

## 2023-01-27 RX ORDER — HEPARIN SODIUM 5000 [USP'U]/ML
5000 INJECTION INTRAVENOUS; SUBCUTANEOUS EVERY 12 HOURS
Refills: 0 | Status: DISCONTINUED | OUTPATIENT
Start: 2023-01-27 | End: 2023-01-28

## 2023-01-27 RX ORDER — SIMVASTATIN 20 MG/1
40 TABLET, FILM COATED ORAL AT BEDTIME
Refills: 0 | Status: DISCONTINUED | OUTPATIENT
Start: 2023-01-27 | End: 2023-01-28

## 2023-01-27 RX ADMIN — SIMVASTATIN 40 MILLIGRAM(S): 20 TABLET, FILM COATED ORAL at 22:55

## 2023-01-27 RX ADMIN — Medication 25 GRAM(S): at 22:54

## 2023-01-27 RX ADMIN — Medication 162 MILLIGRAM(S): at 16:01

## 2023-01-27 NOTE — H&P ADULT - HISTORY OF PRESENT ILLNESS
HPI: 78-year-old male past medical history of hypertension hyperlipidemia diabetes status post PCI x2 in 2021 presents with right-sided arm pain feels similar to last time he had MI nausea without vomiting no shortness of breath no abdominal pain no leg pain no leg swelling. Denies any other symptoms. Cardiologist is Dr. Cantrell. Note that daughter is a hospitalist.     In the ER:   Vitals: /65, HR 74, RR 18, T36.6 oral, and SpO2 98%  EKG: Sinus rhythm with 1st degree A-V block. New T wave inversions in Lead II, III, aVF   Sig Labs: Hgb 10.0, Cr 1.4 eGFR 52, Mg 1.4, Trop <0.01, BNP 1,688   Imaging: Pending CXR    Interventions in the ER: Aspirin 162, X-ray ordered (not yet performed), Cardiology Consulted

## 2023-01-27 NOTE — ED PROVIDER NOTE - OBJECTIVE STATEMENT
78-year-old male past medical history of hypertension hyperlipidemia diabetes status post PCI x2 in 2021 presents with right-sided arm pain feels similar to last time he had MI nausea without vomiting no shortness of breath no abdominal pain no leg pain no leg swelling.  Well appearing, NAD, non toxic. NCAT PERRLA EOMI neck supple non tender normal wob cta bl rrr abdomen s nt nd no rebound no guarding WWPx4 neuro non focal no trauma rule out ACS rule out consider PE, will reach out to Dr. Cantrell cardiac labs EKG CBC CMP

## 2023-01-27 NOTE — H&P ADULT - ASSESSMENT
HPI: 78-year-old male past medical history of hypertension hyperlipidemia diabetes status post PCI x2 in 2021 presents with right-sided arm pain feels similar to last time he had MI nausea without vomiting no shortness of breath no abdominal pain no leg pain no leg swelling. Denies any other symptoms. Cardiologist is Dr. Cantrell. Note that daughter is a hospitalist.     In the ER:   Vitals: /65, HR 74, RR 18, T36.6 oral, and SpO2 98%  EKG: Sinus rhythm with 1st degree A-V block. New T wave inversions in Lead II, III, aVF   Sig Labs: Hgb 10.0, Cr 1.4 eGFR 52, Mg 1.4, Trop <0.01, BNP 1,688   Imaging: Pending CXR    Interventions in the ER: Aspirin 162, X-ray ordered (not yet performed), Cardiology Consulted     # Non-Cardiac Chest Pain in High risk cardiac patient   # CAD s/p 2 BECCA in 2021   # New Onset CHF?  # HTN  # DLD  - Patient clinically and hemodynamically stable on admission   - EKG shows new 1st degree A-V block. New T wave inversions in Lead II, III, aVF   - Labs significant for BNP 1688 and Negative Trops (trend)   - Cardiology Consulted (Plan to stress test tomorrow)  - Admit to tele  - Echo ordered   - TSH, Lipid profile, A1C in AM  - C/w home GDMT (Carvidolol 3.125 BD, Ramipril converted to lisinopril 10, ASpirin 81, Ticagrelor 90 BID)    # DM  - A1C in AM  - Insulin 15/5x3 + SS    # CKD IIIa  - Cr 1.4 eGFR 52  - Trend                                                                 # DVT prophylaxis - Hep 5000 BID  # GI prophylaxis - Pantoprazole 40  # Diet - DASH/CC  # Activity Score (AM-PAC) -AAT  # Code status - Full  # Disposition - Acute

## 2023-01-27 NOTE — ED ADULT NURSE NOTE - OBJECTIVE STATEMENT
Pt with C/.O left arm pain  on and off numbness from yesterday . Pt denies Fever chills N/V/D ,denies dizziness.

## 2023-01-27 NOTE — H&P ADULT - NSHPREVIEWOFSYSTEMS_GEN_ALL_CORE
Review of Systems:  •	CONSTITUTIONAL - No fever  •	SKIN - No rash  •	HEMATOLOGIC - No abnormal bleeding or bruising  •	RESPIRATORY - No shortness of breath, No cough  •	CARDIAC -No chest pain  •	GI - No abdominal pain  •                   - No dysuria   •	ENDO - No polydypsia, No polyuria, No heat/cold intolerance  •	MUSCULOSKELETAL - No joint paint, No swelling, No back pain, Left arm  pain  All other systems negative, unless specified in HPI

## 2023-01-27 NOTE — H&P ADULT - ATTENDING COMMENTS
78-year-old male past medical history of hypertension hyperlipidemia diabetes status post PCI x2 in 2021 presents with right-sided arm pain feels similar to last time he had MI nausea without vomiting no shortness of breath no abdominal pain no leg pain no leg swelling.    ER: W/U  Vitals: /65, HR 74, RR 18, T36.6 oral, and SpO2 98%  EKG: Sinus rhythm with 1st degree A-V block. New T wave inversions in Lead II, III, aVF   Sig Labs: Hgb 10.0, Cr 1.4 eGFR 52, Mg 1.4, Trop <0.01, BNP 1,688     Interventions in the ER: Aspirin 162, X-ray ordered (not yet performed), Cardiology Consulted     IMPRESSION   Atypical Chest Pain with   CAD s/p BECCA 2021  > EKG: Sinus rhythm with 1st degree A-V block. T wave inversions in Lead II, III, aVF  > Trop <0.01, BNP 1,688 , Chest X-Ray noted pending Official Read   >Carvidolol 3.125 BD, Ramipril converted to lisinopril 10, ASpirin 81, Ticagrelor 90 BID  > Admit to Tele, echo , TSH , Lipid A1c , Cardio Eval   Hx HTN and DLD - continue home medication   Hx DM - Hold oral meds;  check fs;  Start insulin  regimen if  serum Glucose >180, start insulin  protocol and adjust insulin  Lantus/Lispro,  Hold for Hypoglycemia Goal  Gaol 140-180   Hx CKD 3  Creatinine at baseline 1.2-1.4 monitor BUN/cr   Normocytic  Anemia - Hgb Stable  monitor cbc 78-year-old male past medical history of hypertension hyperlipidemia diabetes status post PCI x2 in 2021 presents with right-sided arm pain feels similar to last time he had MI nausea without vomiting no shortness of breath no abdominal pain no leg pain no leg swelling.    ER: W/U  Vitals: /65, HR 74, RR 18, T36.6 oral, and SpO2 98%  EKG: Sinus rhythm with 1st degree A-V block. New T wave inversions in Lead II, III, aVF   Sig Labs: Hgb 10.0, Cr 1.4 eGFR 52, Mg 1.4, Trop <0.01, BNP 1,688     Interventions in the ER: Aspirin 162, X-ray ordered (not yet performed), Cardiology Consulted     VITAL SIGNS: AFebrile, vital signs stable  CONSTITUTIONAL: Well-developed; well-nourished; in no acute distress.  SKIN: Skin exam is warm and dry, no acute rash.  HEAD: Normocephalic; atraumatic.  EYES: Pupils  reactive to light, Extraocular movements intact; conjunctiva and sclera clear.  ENT: No nasal discharge; airway clear. Moist mucus membranes.  NECK: Supple; non tender. No rigidity  CARD: Rregular rate and rhythm. Normal S1, S2; no murmurs, gallops, or rubs.  RESP: CT  auscultation bilaterally. No wheezes, rales or rhonchi.  ABD: Abdomen soft; non-tender; non-distended  EXT: Normal ROM. No clubbing, cyanosis or edema.   NEURO: Alert and oriented x 3. No focal deficits.  PSYCH: cooperative, appropriate.     IMPRESSION   Atypical Chest Pain with   CAD s/p BECCA 2021  > EKG: Sinus rhythm with 1st degree A-V block. T wave inversions in Lead II, III, aVF  > Trop <0.01, BNP 1,688 , Chest X-Ray noted pending Official Read   >Carvidolol 3.125 BD, Ramipril converted to lisinopril 10, ASpirin 81, Ticagrelor 90 BID  > Admit to Tele, echo , TSH , Lipid A1c , Cardio Eval   Hx HTN and DLD - continue home medication   Hx DM - Hold oral meds;  check fs;  Start insulin  regimen if  serum Glucose >180, start insulin  protocol and adjust insulin  Lantus/Lispro,  Hold for Hypoglycemia Goal  Gaol 140-180   Hx CKD 3  Creatinine at baseline 1.2-1.4 monitor BUN/cr   Normocytic  Anemia - Hgb Stable  monitor cbc

## 2023-01-27 NOTE — ED ADULT TRIAGE NOTE - CHIEF COMPLAINT QUOTE
pt sts has extensive cardiac hx. co left sided arm pain. denies sob, weakness. had stemi 24 years ago with same symptoms.

## 2023-01-27 NOTE — H&P ADULT - NSHPPHYSICALEXAM_GEN_ALL_CORE
General: AOx3   HEENT: PERRLA  LUNGS: BGAE  HEART: S1S2, RRR  ABDOMEN: Soft, Non-Tender   EXT: Palpable pulses   NEURO: Grossly Intact, Moves all extremities   SKIN: Well perfused

## 2023-01-28 ENCOUNTER — TRANSCRIPTION ENCOUNTER (OUTPATIENT)
Age: 78
End: 2023-01-28

## 2023-01-28 VITALS
HEART RATE: 79 BPM | OXYGEN SATURATION: 99 % | TEMPERATURE: 98 F | DIASTOLIC BLOOD PRESSURE: 60 MMHG | SYSTOLIC BLOOD PRESSURE: 128 MMHG | RESPIRATION RATE: 18 BRPM

## 2023-01-28 LAB
A1C WITH ESTIMATED AVERAGE GLUCOSE RESULT: 7 % — HIGH (ref 4–5.6)
ALBUMIN SERPL ELPH-MCNC: 3.8 G/DL — SIGNIFICANT CHANGE UP (ref 3.5–5.2)
ALP SERPL-CCNC: 41 U/L — SIGNIFICANT CHANGE UP (ref 30–115)
ALT FLD-CCNC: 13 U/L — SIGNIFICANT CHANGE UP (ref 0–41)
ANION GAP SERPL CALC-SCNC: 8 MMOL/L — SIGNIFICANT CHANGE UP (ref 7–14)
AST SERPL-CCNC: 18 U/L — SIGNIFICANT CHANGE UP (ref 0–41)
BILIRUB SERPL-MCNC: 0.4 MG/DL — SIGNIFICANT CHANGE UP (ref 0.2–1.2)
BUN SERPL-MCNC: 20 MG/DL — SIGNIFICANT CHANGE UP (ref 10–20)
CALCIUM SERPL-MCNC: 9.2 MG/DL — SIGNIFICANT CHANGE UP (ref 8.4–10.4)
CHLORIDE SERPL-SCNC: 105 MMOL/L — SIGNIFICANT CHANGE UP (ref 98–110)
CHOLEST SERPL-MCNC: 128 MG/DL — SIGNIFICANT CHANGE UP
CO2 SERPL-SCNC: 26 MMOL/L — SIGNIFICANT CHANGE UP (ref 17–32)
CREAT SERPL-MCNC: 1.3 MG/DL — SIGNIFICANT CHANGE UP (ref 0.7–1.5)
EGFR: 57 ML/MIN/1.73M2 — LOW
ESTIMATED AVERAGE GLUCOSE: 154 MG/DL — HIGH (ref 68–114)
FLUAV AG NPH QL: SIGNIFICANT CHANGE UP
FLUBV AG NPH QL: SIGNIFICANT CHANGE UP
GLUCOSE BLDC GLUCOMTR-MCNC: 163 MG/DL — HIGH (ref 70–99)
GLUCOSE SERPL-MCNC: 141 MG/DL — HIGH (ref 70–99)
HCT VFR BLD CALC: 30.3 % — LOW (ref 42–52)
HCV AB S/CO SERPL IA: 0.03 COI — SIGNIFICANT CHANGE UP
HCV AB SERPL-IMP: SIGNIFICANT CHANGE UP
HDLC SERPL-MCNC: 31 MG/DL — LOW
HGB BLD-MCNC: 9.6 G/DL — LOW (ref 14–18)
LIPID PNL WITH DIRECT LDL SERPL: 69 MG/DL — SIGNIFICANT CHANGE UP
MAGNESIUM SERPL-MCNC: 1.9 MG/DL — SIGNIFICANT CHANGE UP (ref 1.8–2.4)
MCHC RBC-ENTMCNC: 27.6 PG — SIGNIFICANT CHANGE UP (ref 27–31)
MCHC RBC-ENTMCNC: 31.7 G/DL — LOW (ref 32–37)
MCV RBC AUTO: 87.1 FL — SIGNIFICANT CHANGE UP (ref 80–94)
NON HDL CHOLESTEROL: 97 MG/DL — SIGNIFICANT CHANGE UP
NRBC # BLD: 0 /100 WBCS — SIGNIFICANT CHANGE UP (ref 0–0)
PLATELET # BLD AUTO: 245 K/UL — SIGNIFICANT CHANGE UP (ref 130–400)
POTASSIUM SERPL-MCNC: 4.2 MMOL/L — SIGNIFICANT CHANGE UP (ref 3.5–5)
POTASSIUM SERPL-SCNC: 4.2 MMOL/L — SIGNIFICANT CHANGE UP (ref 3.5–5)
PROT SERPL-MCNC: 6.5 G/DL — SIGNIFICANT CHANGE UP (ref 6–8)
RBC # BLD: 3.48 M/UL — LOW (ref 4.7–6.1)
RBC # FLD: 15 % — HIGH (ref 11.5–14.5)
RSV RNA NPH QL NAA+NON-PROBE: SIGNIFICANT CHANGE UP
SARS-COV-2 RNA SPEC QL NAA+PROBE: SIGNIFICANT CHANGE UP
SODIUM SERPL-SCNC: 139 MMOL/L — SIGNIFICANT CHANGE UP (ref 135–146)
TRIGL SERPL-MCNC: 141 MG/DL — SIGNIFICANT CHANGE UP
TROPONIN T SERPL-MCNC: <0.01 NG/ML — SIGNIFICANT CHANGE UP
TROPONIN T SERPL-MCNC: <0.01 NG/ML — SIGNIFICANT CHANGE UP
TSH SERPL-MCNC: 1.63 UIU/ML — SIGNIFICANT CHANGE UP (ref 0.27–4.2)
WBC # BLD: 5.28 K/UL — SIGNIFICANT CHANGE UP (ref 4.8–10.8)
WBC # FLD AUTO: 5.28 K/UL — SIGNIFICANT CHANGE UP (ref 4.8–10.8)

## 2023-01-28 PROCEDURE — 78452 HT MUSCLE IMAGE SPECT MULT: CPT | Mod: 26

## 2023-01-28 PROCEDURE — 93018 CV STRESS TEST I&R ONLY: CPT

## 2023-01-28 PROCEDURE — 93016 CV STRESS TEST SUPVJ ONLY: CPT

## 2023-01-28 PROCEDURE — 71045 X-RAY EXAM CHEST 1 VIEW: CPT | Mod: 26

## 2023-01-28 PROCEDURE — 99223 1ST HOSP IP/OBS HIGH 75: CPT | Mod: GC

## 2023-01-28 PROCEDURE — 93306 TTE W/DOPPLER COMPLETE: CPT | Mod: 26

## 2023-01-28 PROCEDURE — 99239 HOSP IP/OBS DSCHRG MGMT >30: CPT | Mod: GC

## 2023-01-28 RX ORDER — SIMVASTATIN 20 MG/1
1 TABLET, FILM COATED ORAL
Qty: 0 | Refills: 0 | DISCHARGE

## 2023-01-28 RX ORDER — REGADENOSON 0.08 MG/ML
0.4 INJECTION, SOLUTION INTRAVENOUS ONCE
Refills: 0 | Status: DISCONTINUED | OUTPATIENT
Start: 2023-01-28 | End: 2023-01-28

## 2023-01-28 RX ORDER — ATORVASTATIN CALCIUM 80 MG/1
1 TABLET, FILM COATED ORAL
Qty: 30 | Refills: 3
Start: 2023-01-28 | End: 2023-05-27

## 2023-01-28 RX ADMIN — TICAGRELOR 60 MILLIGRAM(S): 90 TABLET ORAL at 06:40

## 2023-01-28 RX ADMIN — MONTELUKAST 10 MILLIGRAM(S): 4 TABLET, CHEWABLE ORAL at 12:18

## 2023-01-28 RX ADMIN — PANTOPRAZOLE SODIUM 40 MILLIGRAM(S): 20 TABLET, DELAYED RELEASE ORAL at 06:40

## 2023-01-28 RX ADMIN — Medication 145 MILLIGRAM(S): at 12:18

## 2023-01-28 RX ADMIN — Medication 81 MILLIGRAM(S): at 12:17

## 2023-01-28 RX ADMIN — Medication 325 MILLIGRAM(S): at 12:18

## 2023-01-28 RX ADMIN — CARVEDILOL PHOSPHATE 3.12 MILLIGRAM(S): 80 CAPSULE, EXTENDED RELEASE ORAL at 06:40

## 2023-01-28 RX ADMIN — Medication 500 MILLIGRAM(S): at 12:30

## 2023-01-28 RX ADMIN — PREGABALIN 1000 MICROGRAM(S): 225 CAPSULE ORAL at 12:17

## 2023-01-28 RX ADMIN — LISINOPRIL 10 MILLIGRAM(S): 2.5 TABLET ORAL at 06:40

## 2023-01-28 NOTE — DISCHARGE NOTE NURSING/CASE MANAGEMENT/SOCIAL WORK - NSDCPEFALRISK_GEN_ALL_CORE
For information on Fall & Injury Prevention, visit: [email or web address] OR  [email or web address] OR  https://www.cdc.gov/steadi/patient.html

## 2023-01-28 NOTE — DISCHARGE NOTE PROVIDER - HOSPITAL COURSE
78-year-old male past medical history of hypertension hyperlipidemia diabetes status post PCI x2 in 2021 presents with right-sided arm pain feels similar to last time he had MI nausea without vomiting no shortness of breath no abdominal pain no leg pain no leg swelling. Denies any other symptoms. Cardiologist is Dr. Cantrell. Note that daughter is a hospitalist.     In the ER:   Vitals: /65, HR 74, RR 18, T36.6 oral, and SpO2 98%  EKG: Sinus rhythm with 1st degree A-V block. New T wave inversions in Lead II, III, aVF   Sig Labs: Hgb 10.0, Cr 1.4 eGFR 52, Mg 1.4, Trop <0.01, BNP 1,688     Interventions in the ER: Aspirin 162, X-ray ordered (not yet performed), Cardiology Consulted     During hospitalization patient remained stable w negative serial trops, and no ischemia on stress test. Echo also done showing ef 50-55% w G1DD. Patient subsequently cleared for d/c with outpatient f/u. 78-year-old male past medical history of hypertension hyperlipidemia diabetes status post PCI x2 in 2021 presents with right-sided arm pain feels similar to last time he had MI nausea without vomiting no shortness of breath no abdominal pain no leg pain no leg swelling. Denies any other symptoms. Cardiologist is Dr. Cantrell. Note that daughter is a hospitalist.     In the ER:   Vitals: /65, HR 74, RR 18, T36.6 oral, and SpO2 98%  EKG: Sinus rhythm with 1st degree A-V block. New T wave inversions in Lead II, III, aVF   Sig Labs: Hgb 10.0, Cr 1.4 eGFR 52, Mg 1.4, Trop <0.01, BNP 1,688     Interventions in the ER: Aspirin 162, X-ray ordered (not yet performed), Cardiology Consulted     During hospitalization patient remained stable w negative serial trops, and no ischemia on stress test. Echo also done showing ef 50-55% w G1DD. Cleared by cardiology.  Patient subsequently cleared for d/c with outpatient f/u.

## 2023-01-28 NOTE — DISCHARGE NOTE PROVIDER - CARE PROVIDERS DIRECT ADDRESSES
[email or web address]

## 2023-01-28 NOTE — CONSULT NOTE ADULT - TIME BILLING
Interviewed and examined patient. Reviewed hospital course, ECG, TTE, LHC, NST, tele, lab work, and medications. Discussed plan with patient and daughter.

## 2023-01-28 NOTE — DISCHARGE NOTE NURSING/CASE MANAGEMENT/SOCIAL WORK - PATIENT PORTAL LINK FT
You can access the FollowMyHealth Patient Portal offered by [facility] by registering at the following website: [email or web address]. By joining [email or web address]s FollowMyHealth portal, you will also be able to view your health information using other applications (apps) compatible with our system.

## 2023-01-28 NOTE — DISCHARGE NOTE PROVIDER - CARE PROVIDER_API CALL
Darryn Armas)  Family Medicine  [address] [place] [id number]  Phone: [phone]  Fax: [phone]  Follow Up Time:    Darryn Armas)  Family Medicine  [address] [place] [address]  Phone: [phone]  Fax: [phone]  Follow Up Time:     Ifeanyi Garcia)  Cardiology; Cardiovascular Disease; Internal Medicine  [address] [place] [address]  Phone: [phone]  Fax: [phone]  Follow Up Time: 1 week

## 2023-01-28 NOTE — CONSULT NOTE ADULT - ATTENDING COMMENTS
Agree with assessment and plan above, unless otherwise documented in my attestation.  Mr Gabe has PMHx of CAD s/p PCI to LCx, HLD who presented with atypical chest pain. He underwent NST, which had no evidence of ischemia. Would continue his current outpatient CAD regimen and have him follow up with Dr Garcia.   -Continue ASA 91 daily  -Continue simavstatin 40 daily  -Continue coreg 3.125 BID and lisinopril 10 daily

## 2023-01-28 NOTE — DISCHARGE NOTE PROVIDER - NSDCFUSCHEDAPPT_GEN_ALL_CORE_FT
[facility]  CARDIOLOGY [address]  Scheduled Appointment: 02/15/2023    [facility]  CARDIOLOGY [address]  Scheduled Appointment: 04/07/2023    Ifeanyi Garcia  [facility]  CARDIOLOGY [address]  Scheduled Appointment: 04/27/2023

## 2023-01-28 NOTE — DISCHARGE NOTE PROVIDER - ATTENDING DISCHARGE PHYSICAL EXAMINATION:
Attending attestation  Attending DC note  Pt seen and examined at bedside. No cp or sob.   vitals, labs, exam stable stress test neg, echo wnl, dw cardiology cleared for dc  Hospital course as above.  Plan dw pt and agreed to plan  Medically cleared for DC. Med recc completed.  DW resident. Spent 32 mins on case   Attending attestation  Attending DC note  Pt seen and examined at bedside. No cp or sob.   vitals, labs, exam stable stress test neg, echo wnl, dw cardiology cleared for dc, arm pain resolved  Hospital course as above.  Plan dw pt and agreed to plan  Medically cleared for DC. Med recc completed.  DW resident. Spent 32 mins on case

## 2023-01-28 NOTE — CONSULT NOTE ADULT - SUBJECTIVE AND OBJECTIVE BOX
Patient is a 78y old  Male who presents with a chief complaint of Left Arm Pain.     HPI: 78-year-old male past medical history of hypertension hyperlipidemia diabetes status post PCI x2 in 2021 presents with right-sided arm pain feels similar to last time he had MI nausea without vomiting no shortness of breath no abdominal pain no leg pain no leg swelling. Denies any other symptoms. Cardiologist is Dr. Cantrell. Note that daughter is a hospitalist.     In the ER:     Vitals: /65, HR 74, RR 18, T36.6 oral, and SpO2 98%  EKG: Sinus rhythm with 1st degree A-V block. New T wave inversions in Lead II, III, aVF   Sig Labs: Hgb 10.0, Cr 1.4 eGFR 52, Mg 1.4, Trop <0.01, BNP 1,688   Imaging: Pending CXR    Interventions in the ER: Aspirin 162, X-ray ordered (not yet performed), Cardiology Consulted  (27 Jan 2023 20:50)      PAST MEDICAL & SURGICAL HISTORY:  DM (diabetes mellitus)      HTN (hypertension)      CAD (coronary artery disease)      Anemia      Spinal stenosis      High cholesterol      H/O myocardial ischemia      Neuropathy      Asthma      H/O colonoscopy      History of surgery on arm          MEDICATIONS  (STANDING):  ascorbic acid 500 milliGRAM(s) Oral daily  aspirin  chewable 81 milliGRAM(s) Oral daily  carvedilol 3.125 milliGRAM(s) Oral every 12 hours  cyanocobalamin 1000 MICROGram(s) Oral daily  fenofibrate Tablet 145 milliGRAM(s) Oral daily  ferrous    sulfate 325 milliGRAM(s) Oral daily  heparin   Injectable 5000 Unit(s) SubCutaneous every 12 hours  lisinopril 10 milliGRAM(s) Oral daily  montelukast 10 milliGRAM(s) Oral daily  pantoprazole    Tablet 40 milliGRAM(s) Oral before breakfast  regadenoson Injectable 0.4 milliGRAM(s) IV Push once  simvastatin 40 milliGRAM(s) Oral at bedtime  ticagrelor 60 milliGRAM(s) Oral every 12 hours    MEDICATIONS  (PRN):  albuterol    90 MICROgram(s) HFA Inhaler 2 Puff(s) Inhalation every 6 hours PRN Shortness of Breath      FAMILY HISTORY:  No pertinent family history (Father, Mother)        Review of Systems:  CONSTITUTIONAL: No fever, weight loss, or fatigue  EYES: No eye pain, visual disturbances, or discharge  ENMT:  No difficulty hearing, tinnitus, vertigo; No sinus or throat pain  NECK: No pain or stiffness  BREASTS: No pain, masses, or nipple discharge  RESPIRATORY: No cough, wheezing, chills or hemoptysis; No shortness of breath  CARDIOVASCULAR: No chest pain, palpitations, dizziness, or leg swelling  GASTROINTESTINAL: No abdominal or epigastric pain. No nausea, vomiting, or hematemesis; No diarrhea or constipation. No melena or hematochezia.  GENITOURINARY: No dysuria, frequency, hematuria, or incontinence  NEUROLOGICAL: No headaches, memory loss, loss of strength, numbness, or tremors  SKIN: No itching, burning, rashes, or lesions   LYMPH NODES: No enlarged glands  ENDOCRINE: No heat or cold intolerance; No hair loss  MUSCULOSKELETAL: No joint pain or swelling; No muscle, back, or extremity pain  PSYCHIATRIC: No depression, anxiety, mood swings, or difficulty sleeping  HEME/LYMPH: No easy bruising, or bleeding gums  ALLERY AND IMMUNOLOGIC: No hives or eczema    SOCIAL HISTORY:    Vital Signs Last 24 Hrs  T(C): 36.6 (28 Jan 2023 08:06), Max: 37 (27 Jan 2023 20:09)  T(F): 97.9 (28 Jan 2023 08:06), Max: 98.6 (27 Jan 2023 20:09)  HR: 61 (28 Jan 2023 08:06) (61 - 75)  BP: 124/61 (28 Jan 2023 08:06) (124/61 - 170/77)  BP(mean): 106 (28 Jan 2023 03:50) (85 - 110)  RR: 18 (28 Jan 2023 08:06) (18 - 20)  SpO2: 99% (28 Jan 2023 08:06) (97% - 100%)    Parameters below as of 28 Jan 2023 08:06  Patient On (Oxygen Delivery Method): room air        Physical Exam:  GENERAL: NAD, well-groomed, well-developed  HEAD:  NCAT  EYES: EOMI, PERRLA, conjunctiva clear  ENMT: No tonsillar erythema, exudates, or enlargement; Moist mucous membranes, Good dentition, No lesions  NECK: Supple, No JVD, Normal thyroid  NERVOUS SYSTEM: AAOX4, Good concentration; Motor Strength 5/5 B/L upper and lower extremities; DTRs 2+ intact and symmetric  CHEST/LUNG: CTA b/l no w/r/r  HEART: +s1s2 RRR no m/g/r  ABDOMEN: soft, NT/ND (+) bs, no HSM  EXTREMITIES:  2+ Peripheral Pulses, No c/c/e  LYMPH: No lymphadenopathy noted  SKIN: No rashes or lesions    ECG:    ECHO:    I&O's Detail      LABS:                        9.6    5.28  )-----------( 245      ( 28 Jan 2023 08:58 )             30.3     01-28    139  |  105  |  20  ----------------------------<  141<H>  4.2   |  26  |  1.3    Ca    9.2      28 Jan 2023 08:58  Mg     1.9     01-28    TPro  6.5  /  Alb  3.8  /  TBili  0.4  /  DBili  x   /  AST  18  /  ALT  13  /  AlkPhos  41  01-28    CARDIAC MARKERS ( 28 Jan 2023 09:16 )  x     / <0.01 ng/mL / x     / x     / x      CARDIAC MARKERS ( 28 Jan 2023 00:34 )  x     / <0.01 ng/mL / x     / x     / x      CARDIAC MARKERS ( 27 Jan 2023 16:08 )  x     / <0.01 ng/mL / x     / x     / x              I&O's Summary    BNPSerum Pro-Brain Natriuretic Peptide: 1688 pg/mL (01-27 @ 16:08)      RADIOLOGY & ADDITIONAL STUDIES: Patient is a 78y old  Male who presents with a chief complaint of Left Arm Pain.     HPI: 78-year-old male past medical history of hypertension hyperlipidemia diabetes status post PCI x2 in 2021 presents with right-sided arm pain feels similar to last time he had MI nausea without vomiting no shortness of breath no abdominal pain no leg pain no leg swelling. Denies any other symptoms. Cardiologist is Dr. Cantrell. Note that daughter is a hospitalist.     In the ER:     Vitals: /65, HR 74, RR 18, T36.6 oral, and SpO2 98%  EKG: Sinus rhythm with 1st degree A-V block. New T wave inversions in Lead II, III, aVF   Sig Labs: Hgb 10.0, Cr 1.4 eGFR 52, Mg 1.4, Trop <0.01, BNP 1,688   Imaging: Pending CXR    Interventions in the ER: Aspirin 162, X-ray ordered , Cardiology Consulted      PAST MEDICAL & SURGICAL HISTORY:  DM (diabetes mellitus)      HTN (hypertension)      CAD (coronary artery disease)      Anemia      Spinal stenosis      High cholesterol      H/O myocardial ischemia      Neuropathy      Asthma      H/O colonoscopy      History of surgery on arm          MEDICATIONS  (STANDING):  ascorbic acid 500 milliGRAM(s) Oral daily  aspirin  chewable 81 milliGRAM(s) Oral daily  carvedilol 3.125 milliGRAM(s) Oral every 12 hours  cyanocobalamin 1000 MICROGram(s) Oral daily  fenofibrate Tablet 145 milliGRAM(s) Oral daily  ferrous    sulfate 325 milliGRAM(s) Oral daily  heparin   Injectable 5000 Unit(s) SubCutaneous every 12 hours  lisinopril 10 milliGRAM(s) Oral daily  montelukast 10 milliGRAM(s) Oral daily  pantoprazole    Tablet 40 milliGRAM(s) Oral before breakfast  regadenoson Injectable 0.4 milliGRAM(s) IV Push once  simvastatin 40 milliGRAM(s) Oral at bedtime  ticagrelor 60 milliGRAM(s) Oral every 12 hours    MEDICATIONS  (PRN):  albuterol    90 MICROgram(s) HFA Inhaler 2 Puff(s) Inhalation every 6 hours PRN Shortness of Breath      FAMILY HISTORY:  No pertinent family history (Father, Mother)        Review of Systems:  CONSTITUTIONAL: No fever, weight loss, or fatigue  EYES: No eye pain, visual disturbances, or discharge  ENMT:  No difficulty hearing, tinnitus, vertigo; No sinus or throat pain  NECK: No pain or stiffness  BREASTS: No pain, masses, or nipple discharge  RESPIRATORY: No cough, wheezing, chills or hemoptysis; No shortness of breath  CARDIOVASCULAR: No chest pain, palpitations, dizziness, or leg swelling  GASTROINTESTINAL: No abdominal or epigastric pain. No nausea, vomiting, or hematemesis; No diarrhea or constipation. No melena or hematochezia.  GENITOURINARY: No dysuria, frequency, hematuria, or incontinence  NEUROLOGICAL: No headaches, memory loss, loss of strength, numbness, or tremors  SKIN: No itching, burning, rashes, or lesions   LYMPH NODES: No enlarged glands  ENDOCRINE: No heat or cold intolerance; No hair loss  MUSCULOSKELETAL: No joint pain or swelling; No muscle, back, or extremity pain  PSYCHIATRIC: No depression, anxiety, mood swings, or difficulty sleeping  HEME/LYMPH: No easy bruising, or bleeding gums  ALLERY AND IMMUNOLOGIC: No hives or eczema    SOCIAL HISTORY:    Vital Signs Last 24 Hrs  T(C): 36.6 (28 Jan 2023 08:06), Max: 37 (27 Jan 2023 20:09)  T(F): 97.9 (28 Jan 2023 08:06), Max: 98.6 (27 Jan 2023 20:09)  HR: 61 (28 Jan 2023 08:06) (61 - 75)  BP: 124/61 (28 Jan 2023 08:06) (124/61 - 170/77)  BP(mean): 106 (28 Jan 2023 03:50) (85 - 110)  RR: 18 (28 Jan 2023 08:06) (18 - 20)  SpO2: 99% (28 Jan 2023 08:06) (97% - 100%)    Parameters below as of 28 Jan 2023 08:06  Patient On (Oxygen Delivery Method): room air        Physical Exam:  GENERAL: NAD, well-groomed, well-developed  HEAD:  NCAT  EYES: EOMI, PERRLA, conjunctiva clear  ENMT: No tonsillar erythema, exudates, or enlargement; Moist mucous membranes, Good dentition, No lesions  NECK: Supple, No JVD, Normal thyroid  NERVOUS SYSTEM: AAOX4, Good concentration; Motor Strength 5/5 B/L upper and lower extremities; DTRs 2+ intact and symmetric  CHEST/LUNG: CTA b/l no w/r/r  HEART: +s1s2 RRR no m/g/r  ABDOMEN: soft, NT/ND (+) bs, no HSM  EXTREMITIES:  2+ Peripheral Pulses, No c/c/e  LYMPH: No lymphadenopathy noted  SKIN: No rashes or lesions    ECG:    ECHO:    I&O's Detail      LABS:                        9.6    5.28  )-----------( 245      ( 28 Jan 2023 08:58 )             30.3     01-28    139  |  105  |  20  ----------------------------<  141<H>  4.2   |  26  |  1.3    Ca    9.2      28 Jan 2023 08:58  Mg     1.9     01-28    TPro  6.5  /  Alb  3.8  /  TBili  0.4  /  DBili  x   /  AST  18  /  ALT  13  /  AlkPhos  41  01-28    CARDIAC MARKERS ( 28 Jan 2023 09:16 )  x     / <0.01 ng/mL / x     / x     / x      CARDIAC MARKERS ( 28 Jan 2023 00:34 )  x     / <0.01 ng/mL / x     / x     / x      CARDIAC MARKERS ( 27 Jan 2023 16:08 )  x     / <0.01 ng/mL / x     / x     / x              I&O's Summary    BNPSerum Pro-Brain Natriuretic Peptide: 1688 pg/mL (01-27 @ 16:08)      RADIOLOGY & ADDITIONAL STUDIES:

## 2023-01-28 NOTE — CONSULT NOTE ADULT - ASSESSMENT
79 yo M with PMHx of HTN, DLD, DM, s/p PCI x2 in 2021 presents with right-sided arm pain    # Atypical chest pain    79 yo M with PMHx of HTN, DLD, DM, CAD s/p PCI x2 in 2021 presents with right-sided arm pain    # Atypical chest pain   # CAD s/p PCI x2 2021  - Trop neg x3  - EKG with new   - Pending Echo and nuclear stress test    - PT not found in room    77 yo M with PMHx of HTN, DLD, DM, CAD s/p PCI x2 in 2021 presents with right-sided arm pain    # Atypical chest pain   # CAD s/p PCI x2 2021  - Trop neg x3  - EKG with 1st degree block (previously seen) and new TWI II, III, aVF   - Pending Echo and nuclear stress test  - C/w ASA, Brilinta      THIS IS AN INCOMPLETE NOTE UNTIL SIGNED BY ATTENDING

## 2023-01-28 NOTE — DISCHARGE NOTE PROVIDER - NSDCCPCAREPLAN_GEN_ALL_CORE_FT
PRINCIPAL DISCHARGE DIAGNOSIS  Diagnosis: Arm pain  Assessment and Plan of Treatment: You were monitored closely and underwent a stress test which was negative. Please follow up with your cardiologist after discharge.

## 2023-01-28 NOTE — DISCHARGE NOTE PROVIDER - PROVIDER TOKENS
PROVIDER:[TOKEN:[[id number]]] PROVIDER:[TOKEN:[[id number]]],PROVIDER:[TOKEN:[[id number]],FOLLOWUP:[1 week]]

## 2023-01-28 NOTE — DISCHARGE NOTE PROVIDER - NSDCMRMEDTOKEN_GEN_ALL_CORE_FT
Aspir 81 oral delayed release tablet: 1 tab(s) orally once a day  atorvastatin 40 mg oral tablet: 1 tab(s) orally once a day   azelastine 0.05% ophthalmic solution: 1 drop(s) to each affected eye once a day  azelastine 137 mcg/inh (0.1%) nasal spray: 1 spray(s) nasal 2 times a day, As Needed  carvedilol 3.125 mg oral tablet: 1 tab(s) orally 2 times a day  cetirizine 10 mg oral tablet: 1 tab(s) orally once a day  fenofibrate 145 mg oral tablet: 1 tab(s) orally once a day  glipiZIDE 10 mg oral tablet: 0.5 tab(s) orally 2 times a day  Iron Chews 15 mg oral tablet, chewable: 1 tab(s) orally once a day  Janumet XR 50 mg-1000 mg oral tablet, extended release: 1 tab(s) orally 2 times a day  HOLD MEDICATION FOR 48 HR AFTER CARDIAC CATH  pioglitazone 45 mg oral tablet: 1 tab(s) orally once a day, As Needed  ProAir HFA 90 mcg/inh inhalation aerosol: 2 puff(s) inhaled every 6 hours, As Needed  ramipril 10 mg oral capsule: 1 cap(s) orally once a day  ticagrelor 90 mg oral tablet: 1 tab(s) orally 2 times a day MDD:2  Vitamin B-100 oral tablet: 1 tab(s) orally once a day  Vitamin B-12 250 mcg oral tablet: 1 tab(s) orally once a day  Zantac 150 oral tablet: 1 tab(s) orally once a day MDD:1

## 2023-01-30 ENCOUNTER — NON-APPOINTMENT (OUTPATIENT)
Age: 78
End: 2023-01-30

## 2023-01-30 ENCOUNTER — OUTPATIENT (OUTPATIENT)
Dept: OUTPATIENT SERVICES | Facility: HOSPITAL | Age: 78
LOS: 1 days | End: 2023-01-30

## 2023-01-30 DIAGNOSIS — Z98.890 OTHER SPECIFIED POSTPROCEDURAL STATES: Chronic | ICD-10-CM

## 2023-01-30 DIAGNOSIS — I25.10 ATHEROSCLEROTIC HEART DISEASE OF NATIVE CORONARY ARTERY WITHOUT ANGINA PECTORIS: ICD-10-CM

## 2023-01-30 DIAGNOSIS — I44.0 ATRIOVENTRICULAR BLOCK, FIRST DEGREE: ICD-10-CM

## 2023-01-30 LAB — GLUCOSE BLDC GLUCOMTR-MCNC: 218 MG/DL — HIGH (ref 70–99)

## 2023-02-01 DIAGNOSIS — Z20.822 CONTACT WITH AND (SUSPECTED) EXPOSURE TO COVID-19: ICD-10-CM

## 2023-02-01 DIAGNOSIS — Z95.5 PRESENCE OF CORONARY ANGIOPLASTY IMPLANT AND GRAFT: ICD-10-CM

## 2023-02-01 DIAGNOSIS — Z79.84 LONG TERM (CURRENT) USE OF ORAL HYPOGLYCEMIC DRUGS: ICD-10-CM

## 2023-02-01 DIAGNOSIS — M79.601 PAIN IN RIGHT ARM: ICD-10-CM

## 2023-02-01 DIAGNOSIS — R07.89 OTHER CHEST PAIN: ICD-10-CM

## 2023-02-01 DIAGNOSIS — I12.9 HYPERTENSIVE CHRONIC KIDNEY DISEASE WITH STAGE 1 THROUGH STAGE 4 CHRONIC KIDNEY DISEASE, OR UNSPECIFIED CHRONIC KIDNEY DISEASE: ICD-10-CM

## 2023-02-01 DIAGNOSIS — E78.5 HYPERLIPIDEMIA, UNSPECIFIED: ICD-10-CM

## 2023-02-01 DIAGNOSIS — I25.2 OLD MYOCARDIAL INFARCTION: ICD-10-CM

## 2023-02-01 DIAGNOSIS — N18.31 CHRONIC KIDNEY DISEASE, STAGE 3A: ICD-10-CM

## 2023-02-01 DIAGNOSIS — D64.9 ANEMIA, UNSPECIFIED: ICD-10-CM

## 2023-02-01 DIAGNOSIS — Z91.041 RADIOGRAPHIC DYE ALLERGY STATUS: ICD-10-CM

## 2023-02-01 DIAGNOSIS — E11.22 TYPE 2 DIABETES MELLITUS WITH DIABETIC CHRONIC KIDNEY DISEASE: ICD-10-CM

## 2023-02-01 DIAGNOSIS — I25.10 ATHEROSCLEROTIC HEART DISEASE OF NATIVE CORONARY ARTERY WITHOUT ANGINA PECTORIS: ICD-10-CM

## 2023-02-01 DIAGNOSIS — Z79.82 LONG TERM (CURRENT) USE OF ASPIRIN: ICD-10-CM

## 2023-02-03 ENCOUNTER — NON-APPOINTMENT (OUTPATIENT)
Age: 78
End: 2023-02-03

## 2023-02-07 ENCOUNTER — NON-APPOINTMENT (OUTPATIENT)
Age: 78
End: 2023-02-07

## 2023-02-13 ENCOUNTER — OUTPATIENT (OUTPATIENT)
Dept: OUTPATIENT SERVICES | Facility: HOSPITAL | Age: 78
LOS: 1 days | End: 2023-02-13
Payer: MEDICARE

## 2023-02-13 DIAGNOSIS — I25.10 ATHEROSCLEROTIC HEART DISEASE OF NATIVE CORONARY ARTERY WITHOUT ANGINA PECTORIS: ICD-10-CM

## 2023-02-13 DIAGNOSIS — Z98.890 OTHER SPECIFIED POSTPROCEDURAL STATES: Chronic | ICD-10-CM

## 2023-02-13 PROCEDURE — 82962 GLUCOSE BLOOD TEST: CPT

## 2023-02-13 PROCEDURE — 93798 PHYS/QHP OP CAR RHAB W/ECG: CPT

## 2023-02-14 DIAGNOSIS — I25.10 ATHEROSCLEROTIC HEART DISEASE OF NATIVE CORONARY ARTERY WITHOUT ANGINA PECTORIS: ICD-10-CM

## 2023-02-14 LAB — GLUCOSE BLDC GLUCOMTR-MCNC: 167 MG/DL — HIGH (ref 70–99)

## 2023-02-15 ENCOUNTER — NON-APPOINTMENT (OUTPATIENT)
Age: 78
End: 2023-02-15

## 2023-02-15 ENCOUNTER — OUTPATIENT (OUTPATIENT)
Dept: OUTPATIENT SERVICES | Facility: HOSPITAL | Age: 78
LOS: 1 days | End: 2023-02-15

## 2023-02-15 ENCOUNTER — APPOINTMENT (OUTPATIENT)
Dept: CARDIOLOGY | Facility: CLINIC | Age: 78
End: 2023-02-15
Payer: MEDICARE

## 2023-02-15 DIAGNOSIS — I25.10 ATHEROSCLEROTIC HEART DISEASE OF NATIVE CORONARY ARTERY WITHOUT ANGINA PECTORIS: ICD-10-CM

## 2023-02-15 DIAGNOSIS — Z98.890 OTHER SPECIFIED POSTPROCEDURAL STATES: Chronic | ICD-10-CM

## 2023-02-15 LAB — GLUCOSE BLDC GLUCOMTR-MCNC: 118 MG/DL — HIGH (ref 70–99)

## 2023-02-15 PROCEDURE — G2066: CPT

## 2023-02-15 PROCEDURE — 93298 REM INTERROG DEV EVAL SCRMS: CPT

## 2023-02-21 ENCOUNTER — OUTPATIENT (OUTPATIENT)
Dept: OUTPATIENT SERVICES | Facility: HOSPITAL | Age: 78
LOS: 1 days | End: 2023-02-21

## 2023-02-21 DIAGNOSIS — Z98.890 OTHER SPECIFIED POSTPROCEDURAL STATES: Chronic | ICD-10-CM

## 2023-02-21 DIAGNOSIS — I25.10 ATHEROSCLEROTIC HEART DISEASE OF NATIVE CORONARY ARTERY WITHOUT ANGINA PECTORIS: ICD-10-CM

## 2023-02-21 LAB — GLUCOSE BLDC GLUCOMTR-MCNC: 110 MG/DL — HIGH (ref 70–99)

## 2023-02-27 ENCOUNTER — OUTPATIENT (OUTPATIENT)
Dept: OUTPATIENT SERVICES | Facility: HOSPITAL | Age: 78
LOS: 1 days | End: 2023-02-27

## 2023-02-27 DIAGNOSIS — Z98.890 OTHER SPECIFIED POSTPROCEDURAL STATES: Chronic | ICD-10-CM

## 2023-02-27 DIAGNOSIS — I25.10 ATHEROSCLEROTIC HEART DISEASE OF NATIVE CORONARY ARTERY WITHOUT ANGINA PECTORIS: ICD-10-CM

## 2023-02-27 LAB — GLUCOSE BLDC GLUCOMTR-MCNC: 173 MG/DL — HIGH (ref 70–99)

## 2023-02-28 DIAGNOSIS — I25.10 ATHEROSCLEROTIC HEART DISEASE OF NATIVE CORONARY ARTERY WITHOUT ANGINA PECTORIS: ICD-10-CM

## 2023-03-06 ENCOUNTER — OUTPATIENT (OUTPATIENT)
Dept: OUTPATIENT SERVICES | Facility: HOSPITAL | Age: 78
LOS: 1 days | End: 2023-03-06
Payer: MEDICARE

## 2023-03-06 DIAGNOSIS — Z98.890 OTHER SPECIFIED POSTPROCEDURAL STATES: Chronic | ICD-10-CM

## 2023-03-06 DIAGNOSIS — I25.10 ATHEROSCLEROTIC HEART DISEASE OF NATIVE CORONARY ARTERY WITHOUT ANGINA PECTORIS: ICD-10-CM

## 2023-03-06 LAB — GLUCOSE BLDC GLUCOMTR-MCNC: 149 MG/DL — HIGH (ref 70–99)

## 2023-03-06 PROCEDURE — 93798 PHYS/QHP OP CAR RHAB W/ECG: CPT

## 2023-03-06 PROCEDURE — 82962 GLUCOSE BLOOD TEST: CPT

## 2023-03-13 ENCOUNTER — OUTPATIENT (OUTPATIENT)
Dept: OUTPATIENT SERVICES | Facility: HOSPITAL | Age: 78
LOS: 1 days | End: 2023-03-13

## 2023-03-13 DIAGNOSIS — I25.10 ATHEROSCLEROTIC HEART DISEASE OF NATIVE CORONARY ARTERY WITHOUT ANGINA PECTORIS: ICD-10-CM

## 2023-03-13 DIAGNOSIS — Z98.890 OTHER SPECIFIED POSTPROCEDURAL STATES: Chronic | ICD-10-CM

## 2023-03-13 LAB — GLUCOSE BLDC GLUCOMTR-MCNC: 200 MG/DL — HIGH (ref 70–99)

## 2023-03-24 ENCOUNTER — OUTPATIENT (OUTPATIENT)
Dept: OUTPATIENT SERVICES | Facility: HOSPITAL | Age: 78
LOS: 1 days | End: 2023-03-24

## 2023-03-24 ENCOUNTER — NON-APPOINTMENT (OUTPATIENT)
Age: 78
End: 2023-03-24

## 2023-03-24 ENCOUNTER — APPOINTMENT (OUTPATIENT)
Age: 78
End: 2023-03-24

## 2023-03-24 DIAGNOSIS — Z98.890 OTHER SPECIFIED POSTPROCEDURAL STATES: Chronic | ICD-10-CM

## 2023-03-24 DIAGNOSIS — I25.10 ATHEROSCLEROTIC HEART DISEASE OF NATIVE CORONARY ARTERY WITHOUT ANGINA PECTORIS: ICD-10-CM

## 2023-04-03 ENCOUNTER — APPOINTMENT (OUTPATIENT)
Age: 78
End: 2023-04-03

## 2023-04-03 ENCOUNTER — OUTPATIENT (OUTPATIENT)
Dept: OUTPATIENT SERVICES | Facility: HOSPITAL | Age: 78
LOS: 1 days | End: 2023-04-03
Payer: MEDICARE

## 2023-04-03 DIAGNOSIS — I25.10 ATHEROSCLEROTIC HEART DISEASE OF NATIVE CORONARY ARTERY WITHOUT ANGINA PECTORIS: ICD-10-CM

## 2023-04-03 DIAGNOSIS — Z98.890 OTHER SPECIFIED POSTPROCEDURAL STATES: Chronic | ICD-10-CM

## 2023-04-03 PROCEDURE — 93798 PHYS/QHP OP CAR RHAB W/ECG: CPT

## 2023-04-07 ENCOUNTER — APPOINTMENT (OUTPATIENT)
Dept: ELECTROPHYSIOLOGY | Facility: CLINIC | Age: 78
End: 2023-04-07
Payer: MEDICARE

## 2023-04-07 ENCOUNTER — APPOINTMENT (OUTPATIENT)
Age: 78
End: 2023-04-07

## 2023-04-07 VITALS
WEIGHT: 167 LBS | DIASTOLIC BLOOD PRESSURE: 70 MMHG | BODY MASS INDEX: 28.67 KG/M2 | SYSTOLIC BLOOD PRESSURE: 120 MMHG | HEART RATE: 74 BPM

## 2023-04-07 DIAGNOSIS — I10 ESSENTIAL (PRIMARY) HYPERTENSION: ICD-10-CM

## 2023-04-07 PROCEDURE — 93000 ELECTROCARDIOGRAM COMPLETE: CPT

## 2023-04-07 PROCEDURE — 99215 OFFICE O/P EST HI 40 MIN: CPT | Mod: 25

## 2023-04-07 RX ORDER — MONTELUKAST 10 MG/1
10 TABLET, FILM COATED ORAL DAILY
Qty: 30 | Refills: 0 | Status: COMPLETED | COMMUNITY
Start: 2022-07-25 | End: 2023-04-07

## 2023-04-10 ENCOUNTER — OUTPATIENT (OUTPATIENT)
Dept: OUTPATIENT SERVICES | Facility: HOSPITAL | Age: 78
LOS: 1 days | End: 2023-04-10

## 2023-04-10 ENCOUNTER — APPOINTMENT (OUTPATIENT)
Age: 78
End: 2023-04-10

## 2023-04-10 DIAGNOSIS — Z98.890 OTHER SPECIFIED POSTPROCEDURAL STATES: Chronic | ICD-10-CM

## 2023-04-10 DIAGNOSIS — I25.10 ATHEROSCLEROTIC HEART DISEASE OF NATIVE CORONARY ARTERY WITHOUT ANGINA PECTORIS: ICD-10-CM

## 2023-04-14 ENCOUNTER — APPOINTMENT (OUTPATIENT)
Age: 78
End: 2023-04-14

## 2023-04-14 ENCOUNTER — OUTPATIENT (OUTPATIENT)
Dept: OUTPATIENT SERVICES | Facility: HOSPITAL | Age: 78
LOS: 1 days | End: 2023-04-14

## 2023-04-14 DIAGNOSIS — Z98.890 OTHER SPECIFIED POSTPROCEDURAL STATES: Chronic | ICD-10-CM

## 2023-04-14 DIAGNOSIS — I25.10 ATHEROSCLEROTIC HEART DISEASE OF NATIVE CORONARY ARTERY WITHOUT ANGINA PECTORIS: ICD-10-CM

## 2023-04-26 ENCOUNTER — APPOINTMENT (OUTPATIENT)
Dept: CARDIOLOGY | Facility: CLINIC | Age: 78
End: 2023-04-26
Payer: MEDICARE

## 2023-04-26 VITALS — WEIGHT: 165 LBS | BODY MASS INDEX: 28.17 KG/M2 | TEMPERATURE: 97.6 F | HEIGHT: 64 IN

## 2023-04-26 VITALS — DIASTOLIC BLOOD PRESSURE: 78 MMHG | SYSTOLIC BLOOD PRESSURE: 136 MMHG | HEART RATE: 77 BPM

## 2023-04-26 DIAGNOSIS — I35.1 NONRHEUMATIC AORTIC (VALVE) INSUFFICIENCY: ICD-10-CM

## 2023-04-26 DIAGNOSIS — I34.0 NONRHEUMATIC MITRAL (VALVE) INSUFFICIENCY: ICD-10-CM

## 2023-04-26 DIAGNOSIS — E78.00 PURE HYPERCHOLESTEROLEMIA, UNSPECIFIED: ICD-10-CM

## 2023-04-26 DIAGNOSIS — E78.5 HYPERLIPIDEMIA, UNSPECIFIED: ICD-10-CM

## 2023-04-26 PROCEDURE — 93000 ELECTROCARDIOGRAM COMPLETE: CPT

## 2023-04-26 PROCEDURE — 99214 OFFICE O/P EST MOD 30 MIN: CPT | Mod: 25

## 2023-04-26 RX ORDER — IPRATROPIUM BROMIDE 42 UG/1
0.06 SPRAY NASAL 3 TIMES DAILY
Qty: 2 | Refills: 6 | Status: DISCONTINUED | COMMUNITY
Start: 2022-07-25 | End: 2023-04-26

## 2023-04-26 RX ORDER — AZELASTINE HYDROCHLORIDE 137 UG/1
0.1 SPRAY, METERED NASAL DAILY
Refills: 0 | Status: DISCONTINUED | COMMUNITY
End: 2023-04-26

## 2023-04-26 RX ORDER — CARVEDILOL 3.12 MG/1
3.12 TABLET, FILM COATED ORAL
Qty: 90 | Refills: 3 | Status: DISCONTINUED | COMMUNITY
Start: 2021-03-17 | End: 2023-04-26

## 2023-04-26 RX ORDER — LEVOCETIRIZINE DIHYDROCHLORIDE 5 MG/1
5 TABLET ORAL DAILY
Qty: 1 | Refills: 3 | Status: DISCONTINUED | COMMUNITY
Start: 2022-07-25 | End: 2023-04-26

## 2023-04-26 NOTE — ASSESSMENT
[FreeTextEntry1] : The patient had CP in January which was atypical Nuclear stress test showed a fixed defect on the apical portion of the LV no ischemia . Echo showed EF 50-55% . The patient has PVC's and periods  of Mobitz I on MCOT with bradycardia. He does need to have beta blockers optimized and it was decided to have a PPM placed . This is pending . LDL is at goal .

## 2023-04-26 NOTE — HISTORY OF PRESENT ILLNESS
[FreeTextEntry1] : The patient had a cardiac cath performed. There was a 70 % lesion in the LAD which was not significant and severe proximal and distal LCX lesion . Had atherectomy and stent in the distal lesion and stent in the proximal lesion He has not had chest pain and feels well overall. He had een seen by Dr. Mcelroy for his VT and frequent PVC's . . The patiient has 80% Prox RCA lesion which is heavily calcified . Nuclear stress showed  only mild apical ischemia which is improved compared to prior nuclear stress test prior to his intervention . The patient has had some dizziness when bending down and picking something up . The patient had another MCOT by Dr. Mcelroy and was told of having Mobitz I and he had suggest to reimplant his loop monitor . There was a 14% PVC burden but no VT . He had an ILM placed . It was decided that he needs a PPM so beta blocker therapy can be better optimized .

## 2023-04-26 NOTE — CARDIOLOGY SUMMARY
Clinic Care Coordination Contact--Social Work Follow Up Call/Assessment  [facility]/Voicemail    Clinical Data: Care Coordinator Outreach. Patient was to call [employer] for medical transportation.    Outreach attempted x 1.  Left message on voicemail with call back information and requested return call.    Plan: Care Coordinator will try to reach patient again in 3-5 business days.    KAT Bruno, JOSE   [phone]  5/15/2017 3:20 PM    Clinic Care Coordination Contact--Social Work Follow Up Call/Assessment  [facility]/Voicemail    Clinical Data: Care Coordinator Outreach. Patient was to call [employer] for medical transportation.    Outreach attempted x 2.  Left message on voicemail with call back information and requested return call.    Plan: Care Coordinator will close to Care Coordination as cannot reach Patient.  This is frequent pattern with Patient.   Care Coordination will often see Patient in clinic and will re-establish care at those visits.  Will update PCP    KAT Bruno, JOSE   [phone]  6/15/2017 3:08 PM   [___] : [unfilled] [de-identified] : 4- NSR with firt degree AV block IVCD rightward axis IVCD \par 4-192022 NSR IVCD PVC's \par 1-4-2022 NSR first degree AV block IVCD rightward axis . PVC . \par 4- NSR first degree AV block IVCD Cannot R/O Old IWMI  [de-identified] : 3-2-2021 PVC's NS VT \par 7-2021 PVC's No VT . 12% PVC buden [de-identified] : 3- Lexiscan stress test inferior ischemia . \par 9- Mild apical ischemia .\par 1-2023 Lexiscan stress test Fixed apical defect . No ischemai   [de-identified] : 4- Normal LV systolic function mild MR and Mild TR . \par 5-4-2021 EF 56% TraceMR trace TR mild AI \par 8- Normal LV systolic function Trace MR mild TR mild enlargement of the sinus of Valsalva and ascending aorta \par 1-2023 [facility] EF 50-55% Mild TR  [de-identified] : 4- LAD 70% stenosis not signficnt by FFR  70% lesion int eh prox LCX and 95% lesion in the distal LCX and 70-80% proc RCA \par 3.x12 Xience stent BECCA in prox LCX \par 3.5x 33 Xience BECCA in the distal lesion  [de-identified] : 10-7-2019 Mild Carotid disease bilaterally .

## 2023-05-04 NOTE — ASSESSMENT
[FreeTextEntry1] : Cardiac h/o CAD, MI, PCI with stent in 2021, frequent PVC, complaints of palpitations. s/p ILR implant for long term arrhythmia monitoring\par \par Left para sternal incision has healed well\par \par Device interrogation showed 9% of PVC\par 1 episode of CHB - patient may have had some symptoms of dizziness, lightheadedness.\par Patient seen with Dr. Mcelroy and discussed with daughter in room and granddaughter over the phone. Due to CHB and risk for progression and need for beta blockers to manage PVC burden, we recommend pacemaker implant and ILR explant. \par \par I have explained the risks and benefits of the procedure to JAMES ESCOTO. There is approximately 1% chance of any major cardiovascular complication to occur. We discussed the nature of procedure, risks, alternatives and follow up care after device is implanted, including remote monitoring. We discussed the risks of the procedure including but not limited to bleeding, hematoma, injury to vessels and heart, perforation, tamponade, cardiac arrest, stroke, need for surgery, pneumothorax, infection and device malfunction. Patient expressed understanding of the discussion. I answered all the questions in details. Patient agreed with proceeding with the device implant. My  will contact patient with date and instruction prior to the procedure.The patient understands the risk and would like to proceed with the procedure. Patient indicated that all of his questions were answered to his satisfaction and verbalized understanding. \par \par I recommend to stop Carvedilol for now and can restart after PPM implant. \par \par RTO in 4-6 weeks post implant

## 2023-05-04 NOTE — HISTORY OF PRESENT ILLNESS
[FreeTextEntry1] : He had frequent PVC's up to 7% burden. He did have 1 4 beat run of VT. He was on Coreg in the past which had been stopped for IVCD with prolonged first degree AV block,The patient has CAD. The patient had an MI in 1998 and had POBA at the time. He has had an old IWMI. Characterization show no significant new CAD.\par \par \par The patient had a cardiac cath performed. There was a 70 % lesion in the LAD which was not significant and severe proximal and distal LCX lesion . Had atherectomy and stent in the distal lesion and stent in the proximal lesion He has not had chest pain and feels well overall. He had seen by Dr. Mcelroy for his VT and frequent PVC's . The patient had a repeat MCOT monitor which showed 12% PVC's . The patient has 80% Proc RCA lesion which is heavily calcified . Nuclear stress showed only mild apical ischemia which is improved compared to prior nuclear stress test prior to his intervention . \par  \par \par Patient feels some palpitations, Had an event monitor that showed nocturnal WB as well as NSVT and PVC's\par An ILR was implanted on 9/28/2022 for long term arrhythmia monitoring.\par \par Had sleep study November 2022 - was told it was okay., \par \par Cardiologist: Dr. Garcia\par PCP: Dr. Canada\par \par \par \par

## 2023-05-04 NOTE — CARDIOLOGY SUMMARY
[de-identified] : 4/7/2023: sinus rhythm 74 bpm 1AVB, 3 PVCs, IVCD\par 4- NSR with first degree AV block IVCD rightward axis IVCD \par 4-192022 NSR IVCD PVC's \par 8/26/2022 NSR 77 bpm  1st degre AV block IVCD\par 10/26/2022 NSR with 1st degree AVB , IN 316ms left posterior Fascicular block, QRS 134ms occ PVC [de-identified] : 3-2-2021 PVC's NS VT \par 7-2021 PVC's No VT . 12% PVC buden [de-identified] : 3- Lexiscan stress test inferior ischemia . \par 9- Mild apical ischemia .  [de-identified] : 4- Normal LV systolic function mild MR and Mild TR . \par 5-4-2021 EF 56% TraceMR trace TR mild AI \par 8- Normal LV systolic function Trace MR mild TR mild enlargement of the sinus of Valsalva and ascending aorta  [de-identified] : 4- LAD 70% stenosis not signficnt by FFR  70% lesion int eh prox LCX and 95% lesion in the distal LCX and 70-80% proc RCA \par 3.x12 Xience stent BECCA in prox LCX \par 3.5x 33 Xience BECCA in the distal lesion  [de-identified] : 10-7-2019 Mild Carotid disease bilaterally .

## 2023-05-04 NOTE — PROCEDURE
[No] : not [NSR] : normal sinus rhythm [Longevity: ___ months] : The estimated remaining battery life is [unfilled] months [Sensing Amplitude ___mv] : sensing amplitude was [unfilled] mv [de-identified] : SAYDA IVERSON [de-identified] : MARC [de-identified] : [id number] [de-identified] : 9/28/2022 [de-identified] : Good [de-identified] : PVC -9%\par 1 too episode c/w CHB\par 1 symptom episode with very frequent PVCs

## 2023-05-05 ENCOUNTER — OUTPATIENT (OUTPATIENT)
Dept: OUTPATIENT SERVICES | Facility: HOSPITAL | Age: 78
LOS: 1 days | End: 2023-05-05
Payer: MEDICARE

## 2023-05-05 ENCOUNTER — APPOINTMENT (OUTPATIENT)
Age: 78
End: 2023-05-05

## 2023-05-05 DIAGNOSIS — Z98.890 OTHER SPECIFIED POSTPROCEDURAL STATES: Chronic | ICD-10-CM

## 2023-05-05 DIAGNOSIS — I25.10 ATHEROSCLEROTIC HEART DISEASE OF NATIVE CORONARY ARTERY WITHOUT ANGINA PECTORIS: ICD-10-CM

## 2023-05-05 PROCEDURE — 93798 PHYS/QHP OP CAR RHAB W/ECG: CPT

## 2023-05-08 ENCOUNTER — APPOINTMENT (OUTPATIENT)
Dept: CARDIOLOGY | Facility: CLINIC | Age: 78
End: 2023-05-08
Payer: MEDICARE

## 2023-05-08 ENCOUNTER — NON-APPOINTMENT (OUTPATIENT)
Age: 78
End: 2023-05-08

## 2023-05-08 PROCEDURE — G2066: CPT

## 2023-05-08 PROCEDURE — 93298 REM INTERROG DEV EVAL SCRMS: CPT

## 2023-05-12 ENCOUNTER — APPOINTMENT (OUTPATIENT)
Age: 78
End: 2023-05-12

## 2023-05-15 ENCOUNTER — APPOINTMENT (OUTPATIENT)
Age: 78
End: 2023-05-15

## 2023-05-16 ENCOUNTER — INPATIENT (INPATIENT)
Facility: HOSPITAL | Age: 78
LOS: 1 days | Discharge: HOME HEALTH | DRG: 244 | End: 2023-05-18
Attending: INTERNAL MEDICINE | Admitting: INTERNAL MEDICINE
Payer: MEDICARE

## 2023-05-16 ENCOUNTER — NON-APPOINTMENT (OUTPATIENT)
Age: 78
End: 2023-05-16

## 2023-05-16 VITALS
DIASTOLIC BLOOD PRESSURE: 63 MMHG | TEMPERATURE: 98 F | OXYGEN SATURATION: 99 % | RESPIRATION RATE: 14 BRPM | WEIGHT: 164.91 LBS | SYSTOLIC BLOOD PRESSURE: 133 MMHG | HEART RATE: 53 BPM

## 2023-05-16 DIAGNOSIS — Z98.890 OTHER SPECIFIED POSTPROCEDURAL STATES: Chronic | ICD-10-CM

## 2023-05-16 DIAGNOSIS — I44.2 ATRIOVENTRICULAR BLOCK, COMPLETE: ICD-10-CM

## 2023-05-16 LAB
ALBUMIN SERPL ELPH-MCNC: 4.2 G/DL — SIGNIFICANT CHANGE UP (ref 3.5–5.2)
ALP SERPL-CCNC: 47 U/L — SIGNIFICANT CHANGE UP (ref 30–115)
ALT FLD-CCNC: 15 U/L — SIGNIFICANT CHANGE UP (ref 0–41)
ANION GAP SERPL CALC-SCNC: 16 MMOL/L — HIGH (ref 7–14)
APTT BLD: 24.3 SEC — LOW (ref 27–39.2)
AST SERPL-CCNC: 23 U/L — SIGNIFICANT CHANGE UP (ref 0–41)
BASOPHILS # BLD AUTO: 0.04 K/UL — SIGNIFICANT CHANGE UP (ref 0–0.2)
BASOPHILS NFR BLD AUTO: 0.6 % — SIGNIFICANT CHANGE UP (ref 0–1)
BILIRUB SERPL-MCNC: 0.3 MG/DL — SIGNIFICANT CHANGE UP (ref 0.2–1.2)
BUN SERPL-MCNC: 17 MG/DL — SIGNIFICANT CHANGE UP (ref 10–20)
CALCIUM SERPL-MCNC: 9.5 MG/DL — SIGNIFICANT CHANGE UP (ref 8.4–10.5)
CHLORIDE SERPL-SCNC: 107 MMOL/L — SIGNIFICANT CHANGE UP (ref 98–110)
CO2 SERPL-SCNC: 18 MMOL/L — SIGNIFICANT CHANGE UP (ref 17–32)
CREAT SERPL-MCNC: 1.5 MG/DL — SIGNIFICANT CHANGE UP (ref 0.7–1.5)
EGFR: 48 ML/MIN/1.73M2 — LOW
EOSINOPHIL # BLD AUTO: 0.18 K/UL — SIGNIFICANT CHANGE UP (ref 0–0.7)
EOSINOPHIL NFR BLD AUTO: 2.6 % — SIGNIFICANT CHANGE UP (ref 0–8)
GLUCOSE SERPL-MCNC: 215 MG/DL — HIGH (ref 70–99)
HCT VFR BLD CALC: 34.2 % — LOW (ref 42–52)
HGB BLD-MCNC: 10.9 G/DL — LOW (ref 14–18)
IMM GRANULOCYTES NFR BLD AUTO: 0.4 % — HIGH (ref 0.1–0.3)
INR BLD: 0.96 RATIO — SIGNIFICANT CHANGE UP (ref 0.65–1.3)
LYMPHOCYTES # BLD AUTO: 1.29 K/UL — SIGNIFICANT CHANGE UP (ref 1.2–3.4)
LYMPHOCYTES # BLD AUTO: 18.6 % — LOW (ref 20.5–51.1)
MAGNESIUM SERPL-MCNC: 1.5 MG/DL — LOW (ref 1.8–2.4)
MCHC RBC-ENTMCNC: 27.8 PG — SIGNIFICANT CHANGE UP (ref 27–31)
MCHC RBC-ENTMCNC: 31.9 G/DL — LOW (ref 32–37)
MCV RBC AUTO: 87.2 FL — SIGNIFICANT CHANGE UP (ref 80–94)
MONOCYTES # BLD AUTO: 0.62 K/UL — HIGH (ref 0.1–0.6)
MONOCYTES NFR BLD AUTO: 8.9 % — SIGNIFICANT CHANGE UP (ref 1.7–9.3)
NEUTROPHILS # BLD AUTO: 4.77 K/UL — SIGNIFICANT CHANGE UP (ref 1.4–6.5)
NEUTROPHILS NFR BLD AUTO: 68.9 % — SIGNIFICANT CHANGE UP (ref 42.2–75.2)
NRBC # BLD: 0 /100 WBCS — SIGNIFICANT CHANGE UP (ref 0–0)
NT-PROBNP SERPL-SCNC: 944 PG/ML — HIGH (ref 0–300)
PLATELET # BLD AUTO: 316 K/UL — SIGNIFICANT CHANGE UP (ref 130–400)
PMV BLD: 10.4 FL — SIGNIFICANT CHANGE UP (ref 7.4–10.4)
POTASSIUM SERPL-MCNC: 4.1 MMOL/L — SIGNIFICANT CHANGE UP (ref 3.5–5)
POTASSIUM SERPL-SCNC: 4.1 MMOL/L — SIGNIFICANT CHANGE UP (ref 3.5–5)
PROT SERPL-MCNC: 6.8 G/DL — SIGNIFICANT CHANGE UP (ref 6–8)
PROTHROM AB SERPL-ACNC: 10.9 SEC — SIGNIFICANT CHANGE UP (ref 9.95–12.87)
RBC # BLD: 3.92 M/UL — LOW (ref 4.7–6.1)
RBC # FLD: 15 % — HIGH (ref 11.5–14.5)
SODIUM SERPL-SCNC: 141 MMOL/L — SIGNIFICANT CHANGE UP (ref 135–146)
TROPONIN T SERPL-MCNC: <0.01 NG/ML — SIGNIFICANT CHANGE UP
WBC # BLD: 6.93 K/UL — SIGNIFICANT CHANGE UP (ref 4.8–10.8)
WBC # FLD AUTO: 6.93 K/UL — SIGNIFICANT CHANGE UP (ref 4.8–10.8)

## 2023-05-16 PROCEDURE — 86901 BLOOD TYPING SEROLOGIC RH(D): CPT

## 2023-05-16 PROCEDURE — 84443 ASSAY THYROID STIM HORMONE: CPT

## 2023-05-16 PROCEDURE — 83036 HEMOGLOBIN GLYCOSYLATED A1C: CPT

## 2023-05-16 PROCEDURE — 80048 BASIC METABOLIC PNL TOTAL CA: CPT

## 2023-05-16 PROCEDURE — 85730 THROMBOPLASTIN TIME PARTIAL: CPT

## 2023-05-16 PROCEDURE — 33208 INSRT HEART PM ATRIAL & VENT: CPT | Mod: KX

## 2023-05-16 PROCEDURE — 99285 EMERGENCY DEPT VISIT HI MDM: CPT

## 2023-05-16 PROCEDURE — 84439 ASSAY OF FREE THYROXINE: CPT

## 2023-05-16 PROCEDURE — 85025 COMPLETE CBC W/AUTO DIFF WBC: CPT

## 2023-05-16 PROCEDURE — 81003 URINALYSIS AUTO W/O SCOPE: CPT

## 2023-05-16 PROCEDURE — 93010 ELECTROCARDIOGRAM REPORT: CPT

## 2023-05-16 PROCEDURE — 71046 X-RAY EXAM CHEST 2 VIEWS: CPT

## 2023-05-16 PROCEDURE — 80053 COMPREHEN METABOLIC PANEL: CPT

## 2023-05-16 PROCEDURE — 83735 ASSAY OF MAGNESIUM: CPT

## 2023-05-16 PROCEDURE — C1889: CPT

## 2023-05-16 PROCEDURE — C1785: CPT

## 2023-05-16 PROCEDURE — 71045 X-RAY EXAM CHEST 1 VIEW: CPT

## 2023-05-16 PROCEDURE — 93280 PM DEVICE PROGR EVAL DUAL: CPT

## 2023-05-16 PROCEDURE — 33286 RMVL SUBQ CAR RHYTHM MNTR: CPT | Mod: 59

## 2023-05-16 PROCEDURE — 85610 PROTHROMBIN TIME: CPT

## 2023-05-16 PROCEDURE — 82962 GLUCOSE BLOOD TEST: CPT

## 2023-05-16 PROCEDURE — 84484 ASSAY OF TROPONIN QUANT: CPT

## 2023-05-16 PROCEDURE — C1892: CPT

## 2023-05-16 PROCEDURE — C1769: CPT

## 2023-05-16 PROCEDURE — 86900 BLOOD TYPING SEROLOGIC ABO: CPT

## 2023-05-16 PROCEDURE — C1898: CPT

## 2023-05-16 PROCEDURE — 71045 X-RAY EXAM CHEST 1 VIEW: CPT | Mod: 26

## 2023-05-16 PROCEDURE — 36415 COLL VENOUS BLD VENIPUNCTURE: CPT

## 2023-05-16 PROCEDURE — 93005 ELECTROCARDIOGRAM TRACING: CPT

## 2023-05-16 PROCEDURE — 86850 RBC ANTIBODY SCREEN: CPT

## 2023-05-16 RX ORDER — MAGNESIUM SULFATE 500 MG/ML
2 VIAL (ML) INJECTION ONCE
Refills: 0 | Status: COMPLETED | OUTPATIENT
Start: 2023-05-16 | End: 2023-05-16

## 2023-05-16 RX ADMIN — Medication 25 GRAM(S): at 23:11

## 2023-05-16 NOTE — ED PROVIDER NOTE - CLINICAL SUMMARY MEDICAL DECISION MAKING FREE TEXT BOX
78-year-old male presented today with third-degree heart block sent in by his cardiologist.  Cardiology was consulted and patient was admitted for further evaluation and care.  Patient did not require transcutaneous pacing or emergent TVP.    Attending Statement: I have personally provided the amount of critical care time documented below excluding time spent on separate procedures.     Critical Care Time Spent (min) Must be 30 or more minutes to qualify: 35.

## 2023-05-16 NOTE — ED PROVIDER NOTE - CONSIDERATION OF ADMISSION OBSERVATION
Patient requires admission to the hospital for further evaluation and management. Consideration of Admission/Observation

## 2023-05-16 NOTE — ED ADULT TRIAGE NOTE - CHIEF COMPLAINT QUOTE
sent in by cardio for pace maker tomorrow, has a loop recorder and in complete heart block, denies active chest pain

## 2023-05-16 NOTE — ED PROVIDER NOTE - PROGRESS NOTE DETAILS
Patient heart rate improved while waiting in ED for admissions.  Heart rate is 90 on monitor.  Heart block resolved.  Patient is accepted to cardiac telemetry.

## 2023-05-16 NOTE — ED PROVIDER NOTE - OBJECTIVE STATEMENT
78 years old male history of hypertension, high cholesterol, DM, CAD, second-degree heart block sent in by his EP doctor secondary to abnormal rhythm recorded from his loop recorder.  As per patient, the loop recorder reporting complete heart block so his EP doctor wanted him to come in to get a pacemaker.  Patient otherwise denies symptoms associated with a hot block.  Denies headache, lightheadedness, near syncope, chest pain, shortness of breath, abdominal pain, vomiting or diarrhea.

## 2023-05-16 NOTE — ED PROVIDER NOTE - PHYSICAL EXAMINATION
CONSTITUTIONAL: Well-appearing; in no apparent distress.   EYES: PERRL; EOM intact.   CARDIOVASCULAR: Bradycardia.  Normal S1, S2; no murmurs, rubs, or gallops.   RESPIRATORY: Normal chest excursion with respiration; breath sounds clear and equal bilaterally; no wheezes, rhonchi, or rales.  GI/: Normal bowel sounds; non-distended; non-tender; no palpable organomegaly.   MS: No calf swelling and tenderness.  SKIN: Normal for age and race; warm; dry; good turgor; no apparent lesions or exudate.   NEURO/PSYCH: A & O x 4; grossly unremarkable.

## 2023-05-17 ENCOUNTER — TRANSCRIPTION ENCOUNTER (OUTPATIENT)
Age: 78
End: 2023-05-17

## 2023-05-17 LAB
A1C WITH ESTIMATED AVERAGE GLUCOSE RESULT: 7 % — HIGH (ref 4–5.6)
ALBUMIN SERPL ELPH-MCNC: 3.7 G/DL — SIGNIFICANT CHANGE UP (ref 3.5–5.2)
ALP SERPL-CCNC: 40 U/L — SIGNIFICANT CHANGE UP (ref 30–115)
ALT FLD-CCNC: 13 U/L — SIGNIFICANT CHANGE UP (ref 0–41)
ANION GAP SERPL CALC-SCNC: 10 MMOL/L — SIGNIFICANT CHANGE UP (ref 7–14)
APPEARANCE UR: CLEAR — SIGNIFICANT CHANGE UP
APTT BLD: 28.3 SEC — SIGNIFICANT CHANGE UP (ref 27–39.2)
AST SERPL-CCNC: 18 U/L — SIGNIFICANT CHANGE UP (ref 0–41)
BASOPHILS # BLD AUTO: 0.04 K/UL — SIGNIFICANT CHANGE UP (ref 0–0.2)
BASOPHILS NFR BLD AUTO: 0.9 % — SIGNIFICANT CHANGE UP (ref 0–1)
BILIRUB SERPL-MCNC: 0.4 MG/DL — SIGNIFICANT CHANGE UP (ref 0.2–1.2)
BILIRUB UR-MCNC: NEGATIVE — SIGNIFICANT CHANGE UP
BLD GP AB SCN SERPL QL: SIGNIFICANT CHANGE UP
BUN SERPL-MCNC: 15 MG/DL — SIGNIFICANT CHANGE UP (ref 10–20)
CALCIUM SERPL-MCNC: 9 MG/DL — SIGNIFICANT CHANGE UP (ref 8.4–10.5)
CHLORIDE SERPL-SCNC: 105 MMOL/L — SIGNIFICANT CHANGE UP (ref 98–110)
CO2 SERPL-SCNC: 25 MMOL/L — SIGNIFICANT CHANGE UP (ref 17–32)
COLOR SPEC: SIGNIFICANT CHANGE UP
CREAT SERPL-MCNC: 1.3 MG/DL — SIGNIFICANT CHANGE UP (ref 0.7–1.5)
DIFF PNL FLD: NEGATIVE — SIGNIFICANT CHANGE UP
EGFR: 57 ML/MIN/1.73M2 — LOW
EOSINOPHIL # BLD AUTO: 0.19 K/UL — SIGNIFICANT CHANGE UP (ref 0–0.7)
EOSINOPHIL NFR BLD AUTO: 4.2 % — SIGNIFICANT CHANGE UP (ref 0–8)
ESTIMATED AVERAGE GLUCOSE: 154 MG/DL — HIGH (ref 68–114)
GLUCOSE BLDC GLUCOMTR-MCNC: 107 MG/DL — HIGH (ref 70–99)
GLUCOSE BLDC GLUCOMTR-MCNC: 110 MG/DL — HIGH (ref 70–99)
GLUCOSE BLDC GLUCOMTR-MCNC: 115 MG/DL — HIGH (ref 70–99)
GLUCOSE BLDC GLUCOMTR-MCNC: 158 MG/DL — HIGH (ref 70–99)
GLUCOSE BLDC GLUCOMTR-MCNC: 204 MG/DL — HIGH (ref 70–99)
GLUCOSE BLDC GLUCOMTR-MCNC: 252 MG/DL — HIGH (ref 70–99)
GLUCOSE SERPL-MCNC: 119 MG/DL — HIGH (ref 70–99)
GLUCOSE UR QL: NEGATIVE — SIGNIFICANT CHANGE UP
HCT VFR BLD CALC: 31.3 % — LOW (ref 42–52)
HGB BLD-MCNC: 10.1 G/DL — LOW (ref 14–18)
IMM GRANULOCYTES NFR BLD AUTO: 0.2 % — SIGNIFICANT CHANGE UP (ref 0.1–0.3)
INR BLD: 0.95 RATIO — SIGNIFICANT CHANGE UP (ref 0.65–1.3)
KETONES UR-MCNC: NEGATIVE — SIGNIFICANT CHANGE UP
LEUKOCYTE ESTERASE UR-ACNC: NEGATIVE — SIGNIFICANT CHANGE UP
LYMPHOCYTES # BLD AUTO: 1.39 K/UL — SIGNIFICANT CHANGE UP (ref 1.2–3.4)
LYMPHOCYTES # BLD AUTO: 30.9 % — SIGNIFICANT CHANGE UP (ref 20.5–51.1)
MAGNESIUM SERPL-MCNC: 1.9 MG/DL — SIGNIFICANT CHANGE UP (ref 1.8–2.4)
MCHC RBC-ENTMCNC: 28.6 PG — SIGNIFICANT CHANGE UP (ref 27–31)
MCHC RBC-ENTMCNC: 32.3 G/DL — SIGNIFICANT CHANGE UP (ref 32–37)
MCV RBC AUTO: 88.7 FL — SIGNIFICANT CHANGE UP (ref 80–94)
MONOCYTES # BLD AUTO: 0.4 K/UL — SIGNIFICANT CHANGE UP (ref 0.1–0.6)
MONOCYTES NFR BLD AUTO: 8.9 % — SIGNIFICANT CHANGE UP (ref 1.7–9.3)
NEUTROPHILS # BLD AUTO: 2.47 K/UL — SIGNIFICANT CHANGE UP (ref 1.4–6.5)
NEUTROPHILS NFR BLD AUTO: 54.9 % — SIGNIFICANT CHANGE UP (ref 42.2–75.2)
NITRITE UR-MCNC: NEGATIVE — SIGNIFICANT CHANGE UP
NRBC # BLD: 0 /100 WBCS — SIGNIFICANT CHANGE UP (ref 0–0)
PH UR: 5.5 — SIGNIFICANT CHANGE UP (ref 5–8)
PLATELET # BLD AUTO: 256 K/UL — SIGNIFICANT CHANGE UP (ref 130–400)
PMV BLD: 9.9 FL — SIGNIFICANT CHANGE UP (ref 7.4–10.4)
POTASSIUM SERPL-MCNC: 4.4 MMOL/L — SIGNIFICANT CHANGE UP (ref 3.5–5)
POTASSIUM SERPL-SCNC: 4.4 MMOL/L — SIGNIFICANT CHANGE UP (ref 3.5–5)
PROT SERPL-MCNC: 6.1 G/DL — SIGNIFICANT CHANGE UP (ref 6–8)
PROT UR-MCNC: NEGATIVE — SIGNIFICANT CHANGE UP
PROTHROM AB SERPL-ACNC: 10.8 SEC — SIGNIFICANT CHANGE UP (ref 9.95–12.87)
RBC # BLD: 3.53 M/UL — LOW (ref 4.7–6.1)
RBC # FLD: 15 % — HIGH (ref 11.5–14.5)
SODIUM SERPL-SCNC: 140 MMOL/L — SIGNIFICANT CHANGE UP (ref 135–146)
SP GR SPEC: 1.01 — SIGNIFICANT CHANGE UP (ref 1.01–1.03)
T4 FREE SERPL-MCNC: 1.4 NG/DL — SIGNIFICANT CHANGE UP (ref 0.9–1.8)
TROPONIN T SERPL-MCNC: 0.01 NG/ML — SIGNIFICANT CHANGE UP
TSH SERPL-MCNC: 1.36 UIU/ML — SIGNIFICANT CHANGE UP (ref 0.27–4.2)
UROBILINOGEN FLD QL: SIGNIFICANT CHANGE UP
WBC # BLD: 4.5 K/UL — LOW (ref 4.8–10.8)
WBC # FLD AUTO: 4.5 K/UL — LOW (ref 4.8–10.8)

## 2023-05-17 PROCEDURE — 99223 1ST HOSP IP/OBS HIGH 75: CPT

## 2023-05-17 PROCEDURE — 93010 ELECTROCARDIOGRAM REPORT: CPT

## 2023-05-17 PROCEDURE — 99223 1ST HOSP IP/OBS HIGH 75: CPT | Mod: 57

## 2023-05-17 PROCEDURE — 33286 RMVL SUBQ CAR RHYTHM MNTR: CPT

## 2023-05-17 PROCEDURE — 33208 INSRT HEART PM ATRIAL & VENT: CPT | Mod: KX

## 2023-05-17 PROCEDURE — 71045 X-RAY EXAM CHEST 1 VIEW: CPT | Mod: 26

## 2023-05-17 RX ORDER — ATORVASTATIN CALCIUM 80 MG/1
40 TABLET, FILM COATED ORAL AT BEDTIME
Refills: 0 | Status: DISCONTINUED | OUTPATIENT
Start: 2023-05-17 | End: 2023-05-18

## 2023-05-17 RX ORDER — KETAMINE HYDROCHLORIDE 100 MG/ML
50 INJECTION INTRAMUSCULAR; INTRAVENOUS ONCE
Refills: 0 | Status: DISCONTINUED | OUTPATIENT
Start: 2023-05-17 | End: 2023-05-17

## 2023-05-17 RX ORDER — LANOLIN ALCOHOL/MO/W.PET/CERES
3 CREAM (GRAM) TOPICAL AT BEDTIME
Refills: 0 | Status: DISCONTINUED | OUTPATIENT
Start: 2023-05-17 | End: 2023-05-18

## 2023-05-17 RX ORDER — SIMVASTATIN 20 MG/1
1 TABLET, FILM COATED ORAL
Refills: 0 | DISCHARGE

## 2023-05-17 RX ORDER — CETIRIZINE HYDROCHLORIDE 10 MG/1
1 TABLET ORAL
Qty: 0 | Refills: 0 | DISCHARGE

## 2023-05-17 RX ORDER — LISINOPRIL 2.5 MG/1
40 TABLET ORAL DAILY
Refills: 0 | Status: DISCONTINUED | OUTPATIENT
Start: 2023-05-17 | End: 2023-05-18

## 2023-05-17 RX ORDER — FENOFIBRATE,MICRONIZED 130 MG
1 CAPSULE ORAL
Qty: 0 | Refills: 0 | DISCHARGE

## 2023-05-17 RX ORDER — ASCORBIC ACID 60 MG
1 TABLET,CHEWABLE ORAL
Refills: 0 | DISCHARGE

## 2023-05-17 RX ORDER — DEXTROSE 50 % IN WATER 50 %
15 SYRINGE (ML) INTRAVENOUS ONCE
Refills: 0 | Status: DISCONTINUED | OUTPATIENT
Start: 2023-05-17 | End: 2023-05-18

## 2023-05-17 RX ORDER — DEXTROSE 50 % IN WATER 50 %
12.5 SYRINGE (ML) INTRAVENOUS ONCE
Refills: 0 | Status: DISCONTINUED | OUTPATIENT
Start: 2023-05-17 | End: 2023-05-18

## 2023-05-17 RX ORDER — TICAGRELOR 90 MG/1
90 TABLET ORAL EVERY 12 HOURS
Refills: 0 | Status: DISCONTINUED | OUTPATIENT
Start: 2023-05-17 | End: 2023-05-18

## 2023-05-17 RX ORDER — INSULIN LISPRO 100/ML
VIAL (ML) SUBCUTANEOUS AT BEDTIME
Refills: 0 | Status: DISCONTINUED | OUTPATIENT
Start: 2023-05-17 | End: 2023-05-18

## 2023-05-17 RX ORDER — RAMIPRIL 5 MG
1 CAPSULE ORAL
Qty: 0 | Refills: 0 | DISCHARGE

## 2023-05-17 RX ORDER — AZELASTINE 137 UG/1
1 SPRAY, METERED NASAL
Qty: 0 | Refills: 0 | DISCHARGE

## 2023-05-17 RX ORDER — HYDROCORTISONE 20 MG
200 TABLET ORAL ONCE
Refills: 0 | Status: DISCONTINUED | OUTPATIENT
Start: 2023-05-17 | End: 2023-05-17

## 2023-05-17 RX ORDER — PIOGLITAZONE HYDROCHLORIDE 15 MG/1
1 TABLET ORAL
Qty: 0 | Refills: 0 | DISCHARGE

## 2023-05-17 RX ORDER — ASPIRIN/CALCIUM CARB/MAGNESIUM 324 MG
81 TABLET ORAL DAILY
Refills: 0 | Status: DISCONTINUED | OUTPATIENT
Start: 2023-05-17 | End: 2023-05-18

## 2023-05-17 RX ORDER — SITAGLIPTIN AND METFORMIN HYDROCHLORIDE 500; 50 MG/1; MG/1
1 TABLET, FILM COATED ORAL
Qty: 0 | Refills: 0 | DISCHARGE

## 2023-05-17 RX ORDER — ALBUTEROL 90 UG/1
2 AEROSOL, METERED ORAL
Qty: 0 | Refills: 0 | DISCHARGE

## 2023-05-17 RX ORDER — PREGABALIN 225 MG/1
1 CAPSULE ORAL
Qty: 0 | Refills: 0 | DISCHARGE

## 2023-05-17 RX ORDER — ERGOCALCIFEROL 1.25 MG/1
1 CAPSULE ORAL
Refills: 0 | DISCHARGE

## 2023-05-17 RX ORDER — IRON,CARBONYL 45 MG
1 TABLET ORAL
Qty: 0 | Refills: 0 | DISCHARGE

## 2023-05-17 RX ORDER — GLUCAGON INJECTION, SOLUTION 0.5 MG/.1ML
1 INJECTION, SOLUTION SUBCUTANEOUS ONCE
Refills: 0 | Status: DISCONTINUED | OUTPATIENT
Start: 2023-05-17 | End: 2023-05-18

## 2023-05-17 RX ORDER — SITAGLIPTIN AND METFORMIN HYDROCHLORIDE 500; 50 MG/1; MG/1
1 TABLET, FILM COATED ORAL
Refills: 0 | DISCHARGE

## 2023-05-17 RX ORDER — DIPHENHYDRAMINE HCL 50 MG
50 CAPSULE ORAL ONCE
Refills: 0 | Status: COMPLETED | OUTPATIENT
Start: 2023-05-17 | End: 2023-05-17

## 2023-05-17 RX ORDER — CETIRIZINE HYDROCHLORIDE 10 MG/1
1 TABLET ORAL
Refills: 0 | DISCHARGE

## 2023-05-17 RX ORDER — AZELASTINE HCL 0.05 %
1 DROPS OPHTHALMIC (EYE)
Qty: 0 | Refills: 0 | DISCHARGE

## 2023-05-17 RX ORDER — DEXTROSE 50 % IN WATER 50 %
25 SYRINGE (ML) INTRAVENOUS ONCE
Refills: 0 | Status: DISCONTINUED | OUTPATIENT
Start: 2023-05-17 | End: 2023-05-18

## 2023-05-17 RX ORDER — SODIUM CHLORIDE 9 MG/ML
1000 INJECTION, SOLUTION INTRAVENOUS
Refills: 0 | Status: DISCONTINUED | OUTPATIENT
Start: 2023-05-17 | End: 2023-05-18

## 2023-05-17 RX ORDER — ALBUTEROL 90 UG/1
2 AEROSOL, METERED ORAL EVERY 6 HOURS
Refills: 0 | Status: DISCONTINUED | OUTPATIENT
Start: 2023-05-17 | End: 2023-05-18

## 2023-05-17 RX ORDER — CARVEDILOL PHOSPHATE 80 MG/1
1 CAPSULE, EXTENDED RELEASE ORAL
Qty: 0 | Refills: 0 | DISCHARGE

## 2023-05-17 RX ORDER — INSULIN LISPRO 100/ML
VIAL (ML) SUBCUTANEOUS
Refills: 0 | Status: DISCONTINUED | OUTPATIENT
Start: 2023-05-17 | End: 2023-05-18

## 2023-05-17 RX ORDER — CEFAZOLIN SODIUM 1 G
2000 VIAL (EA) INJECTION ONCE
Refills: 0 | Status: COMPLETED | OUTPATIENT
Start: 2023-05-17 | End: 2023-05-17

## 2023-05-17 RX ORDER — ACETAMINOPHEN 500 MG
650 TABLET ORAL EVERY 6 HOURS
Refills: 0 | Status: DISCONTINUED | OUTPATIENT
Start: 2023-05-17 | End: 2023-05-18

## 2023-05-17 RX ORDER — PREGABALIN 225 MG/1
1 CAPSULE ORAL
Refills: 0 | DISCHARGE

## 2023-05-17 RX ORDER — CEFAZOLIN SODIUM 1 G
1000 VIAL (EA) INJECTION ONCE
Refills: 0 | Status: COMPLETED | OUTPATIENT
Start: 2023-05-17 | End: 2023-05-17

## 2023-05-17 RX ORDER — ASPIRIN/CALCIUM CARB/MAGNESIUM 324 MG
1 TABLET ORAL
Qty: 0 | Refills: 0 | DISCHARGE

## 2023-05-17 RX ORDER — FENOFIBRATE,MICRONIZED 130 MG
145 CAPSULE ORAL DAILY
Refills: 0 | Status: DISCONTINUED | OUTPATIENT
Start: 2023-05-17 | End: 2023-05-18

## 2023-05-17 RX ORDER — MAGNESIUM SULFATE 500 MG/ML
2 VIAL (ML) INJECTION ONCE
Refills: 0 | Status: COMPLETED | OUTPATIENT
Start: 2023-05-17 | End: 2023-05-17

## 2023-05-17 RX ADMIN — KETAMINE HYDROCHLORIDE 50 MILLIGRAM(S): 100 INJECTION INTRAMUSCULAR; INTRAVENOUS at 15:00

## 2023-05-17 RX ADMIN — LISINOPRIL 40 MILLIGRAM(S): 2.5 TABLET ORAL at 05:09

## 2023-05-17 RX ADMIN — Medication 100 MILLIGRAM(S): at 14:16

## 2023-05-17 RX ADMIN — TICAGRELOR 90 MILLIGRAM(S): 90 TABLET ORAL at 10:58

## 2023-05-17 RX ADMIN — ATORVASTATIN CALCIUM 40 MILLIGRAM(S): 80 TABLET, FILM COATED ORAL at 22:18

## 2023-05-17 RX ADMIN — TICAGRELOR 90 MILLIGRAM(S): 90 TABLET ORAL at 22:18

## 2023-05-17 RX ADMIN — Medication 145 MILLIGRAM(S): at 13:04

## 2023-05-17 RX ADMIN — Medication 81 MILLIGRAM(S): at 13:03

## 2023-05-17 RX ADMIN — Medication 25 GRAM(S): at 10:57

## 2023-05-17 RX ADMIN — Medication 100 MILLIGRAM(S): at 15:00

## 2023-05-17 RX ADMIN — Medication 102 MILLIGRAM(S): at 14:23

## 2023-05-17 NOTE — H&P ADULT - TIME BILLING
Interviewed and examined patient. Reviewed hospital course, ECG, TTE, LHC, CXR, tele, lab work, and medications. Discussed plan with patient and family member. Coordinated with EP.

## 2023-05-17 NOTE — H&P ADULT - ASSESSMENT
Patient is a     Impression & Plan:    #CHB   -admit to cardio tele   - EP consult , ( Dr. Mcelroy sent patient in for admission for plan of PPM implant)  - keep NPO for plan for PPM today  - f/u AM pre-op Labs: BMP, CBC, COAGS , T& S, tsh + free t4, 2nd set of troponiN level    #CAD SP pci x2 BECCA in 2021 Lcx  # HTN   - Cont on ASA 81MG . brilinta  - cont on ACEi, inpatient on lisinopril 40mg ( home med ramipril 10mg)  -  Cont to HOLD AVN blockign agent ( was on coreg , stopped in april, 2023)     #HLD  - cont on Statin, inpatient on atorvastatin 40mg  (home med takes simvastatin 40mg)     # DMII  - ISS while inpatient ( hoem med janumet on hold)   - Check F.S AC & HS     Patient is a 79 yo male  with history of CAD; MI in 1998  sp POBA,  sp PCI with X2 stent to LCX in 2021,  HTN, HLD, DMII, seasonal allergies. He presented to [facility]ED for admission due to CHB noted on ILR ( implanted on 9/28/2022 ).   Due to CHB and risk for progression and need for beta blockers to manage PVC burden, pacemaker implant and ILR explant was recommended. Plan for ppm implant 5/17th by Dr. Mcelroy.     Impression & Plan:    #CHB   -admit to cardio tele   - cont to keep pacer pads on patient   - EP consult , ( Dr. Mcelroy sent patient in for admission for plan of PPM implant)  - keep NPO for  PPM implant today  - f/u AM pre-op Labs: BMP, CBC, COAGS , T& S,   - f/u tsh + free t4, 2nd set of troponin level  - keep magnesium >2, K-serum >4   - hold all Anticoagulants    #CAD SP pci x2 BECCA in 2021 Lcx  # HTN   - Cont on ASA 81MG . brilinta  - cont on ACEi, inpatient on lisinopril 40mg ( home med ramipril 10mg)  -  Cont to HOLD AVN blocking agent ( was on coreg , stopped in april, 2023)   - monitor VS per routine     #HLD  - cont on Statin, inpatient on atorvastatin 40mg  (home med takes simvastatin 40mg)   - Cont on fenofibrate 145mg     # DMII  - ISS while inpatient ( home med Janumet on hold)   - Check F.S AC & HS     Patient is a 77 yo male  with history of CAD; MI in 1998  sp POBA,  sp PCI with X2 stent to LCX in 2021,  HTN, HLD, DMII, seasonal allergies. He presented to [facility]ED for admission due to CHB noted on ILR ( implanted on 9/28/2022 ).   Due to CHB and risk for progression and need for beta blockers to manage PVC burden, pacemaker implant and ILR explant was recommended. Plan for ppm implant 5/17th by Dr. Mcelroy.     Impression & Plan:    #CHB   -admit to cardio tele   - cont to keep pacer pads on patient   - EP consult , ( Dr. Mcelroy sent patient in for admission for plan of PPM implant)  - keep NPO for  PPM implant today  - f/u AM pre-op Labs: BMP, CBC, COAGS , T& S,   - f/u tsh + free t4, 2nd set of troponin level  - obtain AM ecg   - keep magnesium >2, K-serum >4   - hold all Anticoagulants    #CAD SP pci x2 BECCA in 2021 Lcx  # HTN   - Cont on ASA 81MG . brilinta  - cont on ACEi, inpatient on lisinopril 40mg ( home med ramipril 10mg)  -  Cont to HOLD AVN blocking agent ( was on coreg , stopped in april, 2023)   - monitor VS per routine     #HLD  - cont on Statin, inpatient on atorvastatin 40mg  (home med takes simvastatin 40mg)   - Cont on fenofibrate 145mg     # DMII  - ISS while inpatient ( home med Janumet on hold)   - Check F.S AC & HS

## 2023-05-17 NOTE — DISCHARGE NOTE PROVIDER - NSDCMRMEDTOKEN_GEN_ALL_CORE_FT
Aspir 81 oral delayed release tablet: 1 tab(s) orally once a day  azelastine 0.05% ophthalmic solution: 1 drop(s) to each affected eye once a day  azelastine 137 mcg/inh (0.1%) nasal spray: 1 spray(s) nasal 2 times a day, As Needed  cetirizine 10 mg oral tablet: 1 orally prn  ergocalciferol 1.25 mg (50,000 intl units) oral capsule: 1 orally every other week  fenofibrate 145 mg oral tablet: 1 tab(s) orally once a day  ferrous sulfate ER 140mg , 1 TABLET TWICE daily:   Janumet XR 50 mg-1000 mg oral tablet, extended release: 1 tab(s) orally 2 times a day  ProAir HFA 90 mcg/inh inhalation aerosol: 2 puff(s) inhaled every 6 hours, As Needed  ramipril 10 mg oral capsule: 1 cap(s) orally once a day  simvastatin 40 mg oral tablet: 1 orally once a day (at bedtime)  ticagrelor 90 mg oral tablet: 1 tab(s) orally 2 times a day MDD:2  Vitamin B-100 oral tablet: 1 tab(s) orally once a day  Vitamin B12 2500 mcg sublingual tablet: 1 sublingually every other day  Vitamin C 500 mg oral tablet: 1 orally 2 times a day   Aspir 81 oral delayed release tablet: 1 tab(s) orally once a day  azelastine 0.05% ophthalmic solution: 1 drop(s) to each affected eye once a day  azelastine 137 mcg/inh (0.1%) nasal spray: 1 spray(s) nasal 2 times a day, As Needed  cephalexin 500 mg oral tablet: 1 tab(s) orally 2 times a day  cetirizine 10 mg oral tablet: 1 orally prn  ergocalciferol 1.25 mg (50,000 intl units) oral capsule: 1 orally every other week  fenofibrate 145 mg oral tablet: 1 tab(s) orally once a day  ferrous sulfate ER 140mg , 1 TABLET TWICE daily:   Janumet XR 50 mg-1000 mg oral tablet, extended release: 1 tab(s) orally 2 times a day  ProAir HFA 90 mcg/inh inhalation aerosol: 2 puff(s) inhaled every 6 hours, As Needed  ramipril 10 mg oral capsule: 1 cap(s) orally once a day  simvastatin 40 mg oral tablet: 1 orally once a day (at bedtime)  ticagrelor 90 mg oral tablet: 1 tab(s) orally 2 times a day MDD:2  Vitamin B-100 oral tablet: 1 tab(s) orally once a day  Vitamin B12 2500 mcg sublingual tablet: 1 sublingually every other day  Vitamin C 500 mg oral tablet: 1 orally 2 times a day

## 2023-05-17 NOTE — CONSULT NOTE ADULT - ASSESSMENT
77 yo male  with history of CAD; MI in 1998 , sp POBA,  sp PCI with X2 stent to LCX in 2021,  HTN, HLD, DMII, seasonal allergies. He presented to [facility]-ED for admission due to CHB noted on ILR ( implanted on 9/28/2022)    CHB  CAD  HTN  HLD  DM    Plan  Pt requires PPM due to CHB  Keep pt NPO  Will plan for PPM today

## 2023-05-17 NOTE — DISCHARGE NOTE PROVIDER - ATTENDING DISCHARGE PHYSICAL EXAMINATION:
Physical Exam  T(C): 36.5 (05-18-23 @ 07:48), Max: 37.1 (05-17-23 @ 23:55)  HR: 76 (05-18-23 @ 07:48) (64 - 107)  BP: 154/74 (05-18-23 @ 07:48) (126/76 - 154/74)  RR: 19 (05-18-23 @ 07:48) (17 - 20)  SpO2: 98% (05-18-23 @ 07:48) (96% - 99%)  Gen: NAD  CV: RRR, nl S1 and S2, no m/r/g, no LE edema, no JVD  Pulm: CTAB, no crackles  GI: soft, nontender  MSK: normal ROM  Extremities: warm  Neuro: A+Ox3  Psych: cooperative

## 2023-05-17 NOTE — H&P ADULT - HISTORY OF PRESENT ILLNESS
Patient is a with hsitory of CAD sp PCI X2 stent LCX ( 2021), HTN, HLD, DMII, seasonal allergies. He presented to [facility]-ED for admission due to CHB noted on ILR ( implanted on 9/28/2022 ).    He had frequent PVC's up to 7% burden. He did have 1 4 beat run of VT. He was on Coreg in the past which had been stopped for IVCD with prolonged first degree AV block,The patient has CAD. The patient had an MI in 1998 and had POBA at the time. He has had an old IWMI. Characterization show no significant new CAD.      The patient had a cardiac cath performed. There was a 70 % lesion in the LAD which was not significant and severe proximal and distal LCX lesion . Had atherectomy and stent in the distal lesion and stent in the proximal lesion He has not had chest pain and feels well overall. He had seen by Dr. Mcelroy for his VT and frequent PVC's . The patient had a repeat MCOT monitor which showed 12% PVC's . The patient has 80% Proc RCA lesion which is heavily calcified . Nuclear stress showed only mild apical ischemia which is improved compared to prior nuclear stress test prior to his intervention .        Patient feels some palpitations, Had an event monitor that showed nocturnal WB as well as NSVT and PVC'    Had sleep study November 2022 - was told it was okay.,    Patient is a 77 yo male  with history of CAD; MI in 1998 , sp POBA, in 2021 sp PCI with X2 stent to LCX,  HTN, HLD, DMII, seasonal allergies. He presented to [facility]ED for admission due to CHB noted on ILR ( implanted on 9/28/2022 ).    He had frequent PVC's up to 7% burden. He did have 1 4 beat run of VT. He was on Coreg in the past which had been stopped for IVCD with prolonged first degree AV block,       He had seen by Dr. Mcelroy for his VT and frequent PVC's . The patient had a repeat MCOT monitor which showed 12% PVC's .      Patient feels some palpitations, Had an event monitor that showed nocturnal WB as well as NSVT and PVC'  Diagnostic studies:  NST 2023:  ECHO 2023  MCOT:   [facility] 2021:  LAD 70% stenosis not signficnt by FFR 70% lesion int eh prox LCX and 95% lesion in the distal LCX and 70-80% proc RCA   3.x12 Xience stent BECCA in prox LCX   3.5x 33 Xience BECCA in the distal lesion  Patient is a 77 yo male  with history of CAD; MI in 1998 , sp POBA,  sp PCI with X2 stent to LCX in 2021,  HTN, HLD, DMII, seasonal allergies. He presented to [facility]-ED for admission due to CHB noted on ILR ( implanted on 9/28/2022 ).  He had seen  Dr. Mcelroy for  VT and frequent PVC's. Patient with c/o palpitation with  intermittent lightheadedness and dizziness.  Denies chest pain, SOB, syncope , PND or orthopnea.   As outpatient, event monitor was checked and  patient had nocturnal WB as well as NSVT and frequent PVC's. In 5/23, event monitor shows1 too episode c/w CHB & 1 symptom episode with very frequent PVCs.    Due to CHB and risk for progression and need for beta blockers to manage PVC burden, pacemaker implant and ILR explant was recommended. Patient will be admitted to cardiology telemetry for monitoring and prepare for procedure.    Diagnostic studies:  2023 Lexiscan stress test : shows Fixed apical defect. No ischemia     ECHO 2023:  EF 50-55% Mild TR     [facility] 2021:  LAD 70% stenosis not significant by FFR 70% lesion in the prox LCX and 95% lesion in the distal LCX and 70-80% proc RCA   3.x12 Xience stent BECCA in prox LCX   3.5x 33 Xience BECCA in the distal lesion

## 2023-05-17 NOTE — DISCHARGE NOTE PROVIDER - NSDCCPTREATMENT_GEN_ALL_CORE_FT
PRINCIPAL PROCEDURE  Procedure: Insertion of dual lead pacemaker  Findings and Treatment: - Please take Keflex 500 mg (Bactrim / doxycycline 100 mg) twice daily for 5 days.  - You should restart your Eliquis (Xarelto / coumadin )*** on ***.  - Do not shower for 5 days   - Do not drive or operate heavy machinery for 1 week   - Do not submerge in water (example: baths, swimming) for 1 month.  - Do not lift your left arm greater than shoulder height for 6 weeks.  - Do not lift anything heavier than 5-10 lbs with your left arm for 6 weeks.  - Any sudden swelling, redness, fever, discharge, or severe pain, call the Electrophysiology Office at [phone].     PRINCIPAL PROCEDURE  Procedure: Insertion of dual lead pacemaker  Findings and Treatment: - Please take Keflex 500 mg twice daily for 5 days.  - Do not shower for 5 days   - Do not drive or operate heavy machinery for 1 week   - Do not submerge in water (example: baths, swimming) for 1 month.  - Do not lift your left arm greater than shoulder height for 6 weeks.  - Do not lift anything heavier than 5-10 lbs with your left arm for 6 weeks.  - Any sudden swelling, redness, fever, discharge, or severe pain, call the Electrophysiology Office at [phone].

## 2023-05-17 NOTE — DISCHARGE NOTE NURSING/CASE MANAGEMENT/SOCIAL WORK - PATIENT PORTAL LINK FT
You can access the FollowMyHealth Patient Portal offered by [facility] by registering at the following website: [email or web address]. By joining [facility]’s FollowMyHealth portal, you will also be able to view your health information using other applications (apps) compatible with our system.

## 2023-05-17 NOTE — H&P ADULT - NSHPPHYSICALEXAM_GEN_ALL_CORE
General: No apparent distress  HEENT: Anicteric sclera. Moist mucous membranes. no JVD .   Cardiac: Regular rate and rhythm. No murmurs, rubs, or gallops.   Vascular: Symmetric radial pulses. Dorsalis pedis pulses palpable.   Respiratory: Normal effort. Clear to ascultation.   Abdomen: Soft, nontender. Audible bowel sounds.   Extremities: Warm with NO edema. No cyanosis or clubbing.   Skin: Warm and dry. No rash.   Neurologic: Grossly normal motor function.   Psychiatric: Euthymic. Oriented to person, place, and time.

## 2023-05-17 NOTE — PATIENT PROFILE ADULT - FALL HARM RISK - HARM RISK INTERVENTIONS

## 2023-05-17 NOTE — PROGRESS NOTE ADULT - NS ATTEND AMEND GEN_ALL_CORE FT
Agree with assessment and plan above, unless otherwise documented in my attestation.    See H&P signed today for assessment. Plan for PPM today.

## 2023-05-17 NOTE — CONSULT NOTE ADULT - SUBJECTIVE AND OBJECTIVE BOX
Patient is a 78y old  Male who presents with a chief complaint of CHB noted on ILR (17 May 2023 03:13)    HPI:  Patient is a 79 yo male  with history of CAD; MI in  , sp POBA,  sp PCI with X2 stent to LCX in ,  HTN, HLD, DMII, seasonal allergies. He presented to [facility]ED for admission due to CHB noted on ILR ( implanted on 2022 ).  He had seen  Dr. Mcelroy for  VT and frequent PVC's. Patient with c/o palpitation with  intermittent lightheadedness and dizziness.  Denies chest pain, SOB, syncope , PND or orthopnea.   As outpatient, event monitor was checked and  patient had nocturnal WB as well as NSVT and frequent PVC's. In , event monitor shows1 too episode c/w CHB & 1 symptom episode with very frequent PVCs.    Due to CHB and risk for progression and need for beta blockers to manage PVC burden, pacemaker implant and ILR explant was recommended. Patient will be admitted to cardiology telemetry for monitoring and prepare for procedure.    Diagnostic studies:   Lexiscan stress test : shows Fixed apical defect. No ischemia     ECHO :  EF 50-55% Mild TR     LHC :  LAD 70% stenosis not significant by FFR 70% lesion in the prox LCX and 95% lesion in the distal LCX and 70-80% proc RCA   3.x12 Xience stent BECCA in prox LCX   3.5x 33 Xience BECCA in the distal lesion     EP was consulted for PPM due to CHB    PAST MEDICAL & SURGICAL HISTORY:  DM (diabetes mellitus)    HTN (hypertension)    CAD (coronary artery disease)    Anemia    Spinal stnosis    High cholesterol    H/O myocardial ischemia    Neuropaty    Asthma    H/O colonoscopy    History of surgery on arm      PREVIOUS DIAGNOSTIC TESTING:      ECHO  FINDINGS:  < from: TTE Echo Complete w/o Contrast w/ Doppler (23 @ 08:13) >  Summary:   1. Left ventricular ejection fraction, by visual estimation, is 50 to   55%.   2. Mildly increased LV wall thickness.   3. Spectral Doppler shows impaired relaxation pattern of left   ventricular myocardial filling (Grade I diastolic dysfunction).   4. Normal left atrial size.   5. Normal right atrial size.   6. Mild mitral annular calcification.   7. No evidence of mitral valve regurgitation.   8. Mild tricuspid regurgitation.    MEDICATIONS  (STANDING):  aspirin enteric coated 81 milliGRAM(s) Oral daily  atorvastatin 40 milliGRAM(s) Oral at bedtime  dextrose 5%. 1000 milliLiter(s) (100 mL/Hr) IV Continuous <Continuous>  dextrose 5%. 1000 milliLiter(s) (50 mL/Hr) IV Continuous <Continuous>  dextrose 50% Injectable 25 Gram(s) IV Push once  dextrose 50% Injectable 25 Gram(s) IV Push once  dextrose 50% Injectable 12.5 Gram(s) IV Push once  fenofibrate Tablet 145 milliGRAM(s) Oral daily  glucagon  Injectable 1 milliGRAM(s) IntraMuscular once  insulin lispro (ADMELOG) corrective regimen sliding scale   SubCutaneous three times a day before meals  insulin lispro (ADMELOG) corrective regimen sliding scale   SubCutaneous at bedtime  lisinopril 40 milliGRAM(s) Oral daily  magnesium sulfate  IVPB 2 Gram(s) IV Intermittent once  ticagrelor 90 milliGRAM(s) Oral every 12 hours    MEDICATIONS  (PRN):  acetaminophen     Tablet .. 650 milliGRAM(s) Oral every 6 hours PRN Mild Pain (1 - 3)  albuterol    90 MICROgram(s) HFA Inhaler 2 Puff(s) Inhalation every 6 hours PRN Shortness of Breath and/or Wheezing  dextrose Oral Gel 15 Gram(s) Oral once PRN Blood Glucose LESS THAN 70 milliGRAM(s)/deciliter  melatonin 3 milliGRAM(s) Oral at bedtime PRN Insomnia    FAMILY HISTORY:  No pertinent family history (Father, Mother)    SOCIAL HISTORY:    CIGARETTES: none    ALCOHOL: none    Past Surgical History:    Allergies:    contrast media (iodine-based) (Unknown)    REVIEW OF SYSTEMS:  as above    Vital Signs Last 24 Hrs  T(C): 35.8 (17 May 2023 07:56), Max: 36.5 (17 May 2023 00:33)  T(F): 96.5 (17 May 2023 07:56), Max: 97.7 (17 May 2023 00:33)  HR: 64 (17 May 2023 07:56) (53 - 97)  BP: 145/65 (17 May 2023 07:56) (133/63 - 172/77)  BP(mean): 93 (17 May 2023 07:56) (92 - 114)  RR: 20 (17 May 2023 07:56) (14 - 24)  SpO2: 99% (17 May 2023 07:56) (98% - 99%)    Parameters below as of 17 May 2023 04:15  Patient On (Oxygen Delivery Method): room air    PHYSICAL EXAM:    GENERAL: In no apparent distress, well nourished, and hydrated.  HEART: Regular rate and rhythm; No murmurs, rubs, or gallops.  PULMONARY: Clear to auscultation and perfusion.  No rales, wheezing, or rhonchi bilaterally.  ABDOMEN: Soft, Nontender, Nondistended; Bowel sounds present  EXTREMITIES:  2+ Peripheral Pulses, No clubbing, cyanosis, or edema  NEUROLOGICAL: Grossly nonfocal    INTERPRETATION OF TELEMETRY:  [facility]    ECG:  < from: 12 Lead ECG (23 @ 13:30) >  Ventricular Rate 69 BPM    Atrial Rate 69 BPM    P-R Interval 312 ms    QRS Duration 140 ms    Q-T Interval 434 ms    QTC Calculation(Bazett) 465 ms    P Axis 15 degrees    R Axis 115 degrees    T Axis -27 degrees    Diagnosis Line Sinus rhythm with 1st degree A-V block with Fusion complexes and Premature  atrial complexes with Aberrant conduction  Right bundle branch block  Left posterior fascicular block  *** Bifascicular block ***  Possible Inferior infarct , age undetermined  Abnormal ECG      LABS:                        10.1   4.50  )-----------( 256      ( 17 May 2023 06:49 )             31.3     05-    140  |  105  |  15  ----------------------------<  119<H>  4.4   |  25  |  1.3    Ca    9.0      17 May 2023 06:49  Mg     1.9     -    TPro  6.1  /  Alb  3.7  /  TBili  0.4  /  DBili  x   /  AST  18  /  ALT  13  /  AlkPhos  40  05-17    CARDIAC MARKERS ( 17 May 2023 06:49 )  x     / 0.01 ng/mL / x     / x     / x      CARDIAC MARKERS ( 16 May 2023 21:31 )  x     / <0.01 ng/mL / x     / x     / x          PT/INR - ( 17 May 2023 06:49 )   PT: 10.80 sec;   INR: 0.95 ratio         PTT - ( 17 May 2023 06:49 )  PTT:28.3 sec  Urinalysis Basic - ( 17 May 2023 05:15 )    Color: Light Yellow / Appearance: Clear / S.011 / pH: x  Gluc: x / Ketone: Negative  / Bili: Negative / Urobili: <2 mg/dL   Blood: x / Protein: Negative / Nitrite: Negative   Leuk Esterase: Negative / RBC: x / WBC x   Sq Epi: x / Non Sq Epi: x / Bacteria: x      BNP      RADIOLOGY & ADDITIONAL STUDIES:

## 2023-05-17 NOTE — H&P ADULT - NSHPLABSRESULTS_GEN_ALL_CORE
- Telemetry:   - Echo : (< from: TTE Echo Complete w/o Contrast w/ Doppler (01.28.23 @ 08:13) >    Summary:   1. Left ventricular ejection fraction, by visual estimation, is 50 to   55%.   2. Mildly increased LV wall thickness.   3. Spectral Doppler shows impaired relaxation pattern of left   ventricular myocardial filling (Grade I diastolic dysfunction).   4. Normal left atrial size.   5. Normal right atrial size.   6. Mild mitral annular calcification.   7. No evidence of mitral valve regurgitation.   8. Mild tricuspid regurgitation.    < end of copied text >      - ECG (  - Radiology:    - Labs:                        10.9   6.93  )-----------( 316      ( 16 May 2023 21:31 )             34.2     05-16    141  |  107  |  17  ----------------------------<  215<H>  4.1   |  18  |  1.5    Ca    9.5      16 May 2023 21:31  Mg     1.5     05-16    TPro  6.8  /  Alb  4.2  /  TBili  0.3  /  DBili  x   /  AST  23  /  ALT  15  /  AlkPhos  47  05-16    LIVER FUNCTIONS - ( 16 May 2023 21:31 )  Alb: 4.2 g/dL / Pro: 6.8 g/dL / ALK PHOS: 47 U/L / ALT: 15 U/L / AST: 23 U/L / GGT: x           PT/INR - ( 16 May 2023 21:31 )   PT: 10.90 sec;   INR: 0.96 ratio         PTT - ( 16 May 2023 21:31 )  PTT:24.3 sec  Troponin T, Serum: <0.01 ng/mL (05-16 @ 21:31)    CARDIAC MARKERS ( 16 May 2023 21:31 )  x     / <0.01 ng/mL / x     / x     / x            Lactate Trend - Telemetry:   - Echo : (< from: TTE Echo Complete w/o Contrast w/ Doppler (01.28.23 @ 08:13) >    Summary:   1. Left ventricular ejection fraction, by visual estimation, is 50 to   55%.   2. Mildly increased LV wall thickness.   3. Spectral Doppler shows impaired relaxation pattern of left   ventricular myocardial filling (Grade I diastolic dysfunction).   4. Normal left atrial size.   5. Normal right atrial size.   6. Mild mitral annular calcification.   7. No evidence of mitral valve regurgitation.   8. Mild tricuspid regurgitation.    < end of copied text >      - ECG  (01.27.23 )    Diagnosis Line Sinus rhythm with 1st degree A-V block with Fusion complexes and Premature  atrial complexes with Aberrant conduction  Right bundle branch block  Left posterior fascicular block  *** Bifascicular block ***  Possible Inferior infarct , age undetermined  Abnormal ECG    < end of copied text >      - Radiology:    - Labs:                        10.9   6.93  )-----------( 316      ( 16 May 2023 21:31 )             34.2     05-16    141  |  107  |  17  ----------------------------<  215<H>  4.1   |  18  |  1.5    Ca    9.5      16 May 2023 21:31  Mg     1.5     05-16    TPro  6.8  /  Alb  4.2  /  TBili  0.3  /  DBili  x   /  AST  23  /  ALT  15  /  AlkPhos  47  05-16    LIVER FUNCTIONS - ( 16 May 2023 21:31 )  Alb: 4.2 g/dL / Pro: 6.8 g/dL / ALK PHOS: 47 U/L / ALT: 15 U/L / AST: 23 U/L / GGT: x           PT/INR - ( 16 May 2023 21:31 )   PT: 10.90 sec;   INR: 0.96 ratio         PTT - ( 16 May 2023 21:31 )  PTT:24.3 sec  Troponin T, Serum: <0.01 ng/mL (05-16 @ 21:31)    CARDIAC MARKERS ( 16 May 2023 21:31 )  x     / <0.01 ng/mL / x     / x     / x            Lactate Trend

## 2023-05-17 NOTE — DISCHARGE NOTE PROVIDER - HOSPITAL COURSE
Patient is a 77 yo male  with history of CAD; MI in 1998 , sp POBA,  sp PCI with X2 stent to LCX in 2021,  HTN, HLD, DMII, seasonal allergies. He presented to [facility]-ED for admission due to CHB noted on ILR ( implanted on 9/28/2022 ). He had seen Dr. Mcelroy for  VT and frequent PVC's. Patient with c/o palpitation with  intermittent lightheadedness and dizziness.  Denies chest pain, SOB, syncope , PND or orthopnea. As outpatient, event monitor was checked and patient had nocturnal WB as well as NSVT and frequent PVC's. In 5/23, event monitor shows1 too episode c/w CHB & 1 symptom episode with very frequent PVCs. Due to CHB and risk for progression and need for beta blockers to manage PVC burden, pacemaker implant and ILR explant was recommended. Patient will be admitted to cardiology telemetry for monitoring and prepare for procedure.    On (05/17/23) patient underwent successful Dual chamber permanent pacemaker implantation and Successful explant of loop recorder. Patient was monitored overnight. On POD 1 patient remains HD stable with no complaints. Examination of PPM pocket is C/D/I with no hematoma or erythema. Device interrogation reveals normal device function and CXR was negative for pneumothorax. Patient is being DC home in stable condition.  Patient will follow with Dr Mcelroy in  2 weeks.

## 2023-05-17 NOTE — DISCHARGE NOTE PROVIDER - CARE PROVIDER_API CALL
Bk Mcelroy; EL[place])  [place] [address]  Phone: [phone]  Fax: [phone]  Scheduled Appointment: 05/31/2023

## 2023-05-17 NOTE — H&P ADULT - NS ATTEND AMEND GEN_ALL_CORE FT
Agree with assessment and plan above, unless otherwise documented in my attestation.    Mr Gabe is a 78yoM with PMHx of CAD s/p LCx PCI 2021, HTN, HLD, and DM who was sent in to [facility] due to CHB on ILR.   -EP planning for PPM today  -NPO  -Continue DAPT with ASA/brilinta and atorvastatin for CAD  -Continue lisinopril for HTN

## 2023-05-17 NOTE — DISCHARGE NOTE PROVIDER - NSDCFUSCHEDAPPT_GEN_ALL_CORE_FT
Bk Mcelroy  [facility] PreAdmits  Scheduled Appointment: 05/31/2023    Devyn Marie  [facility]  OTOLARYNG [address]  Scheduled Appointment: 06/29/2023    [facility]  CARDIOLOGY [address]  Scheduled Appointment: 07/03/2023

## 2023-05-17 NOTE — DISCHARGE NOTE PROVIDER - NSDCCPCAREPLAN_GEN_ALL_CORE_FT
PRINCIPAL DISCHARGE DIAGNOSIS  Diagnosis: Complete heart block  Assessment and Plan of Treatment:

## 2023-05-18 VITALS
SYSTOLIC BLOOD PRESSURE: 154 MMHG | RESPIRATION RATE: 19 BRPM | OXYGEN SATURATION: 98 % | HEART RATE: 76 BPM | DIASTOLIC BLOOD PRESSURE: 74 MMHG | TEMPERATURE: 98 F

## 2023-05-18 LAB
ANION GAP SERPL CALC-SCNC: 11 MMOL/L — SIGNIFICANT CHANGE UP (ref 7–14)
BUN SERPL-MCNC: 16 MG/DL — SIGNIFICANT CHANGE UP (ref 10–20)
CALCIUM SERPL-MCNC: 8.4 MG/DL — SIGNIFICANT CHANGE UP (ref 8.4–10.5)
CHLORIDE SERPL-SCNC: 107 MMOL/L — SIGNIFICANT CHANGE UP (ref 98–110)
CO2 SERPL-SCNC: 24 MMOL/L — SIGNIFICANT CHANGE UP (ref 17–32)
CREAT SERPL-MCNC: 1.5 MG/DL — SIGNIFICANT CHANGE UP (ref 0.7–1.5)
EGFR: 48 ML/MIN/1.73M2 — LOW
GLUCOSE BLDC GLUCOMTR-MCNC: 172 MG/DL — HIGH (ref 70–99)
GLUCOSE BLDC GLUCOMTR-MCNC: 175 MG/DL — HIGH (ref 70–99)
GLUCOSE SERPL-MCNC: 158 MG/DL — HIGH (ref 70–99)
MAGNESIUM SERPL-MCNC: 1.8 MG/DL — SIGNIFICANT CHANGE UP (ref 1.8–2.4)
POTASSIUM SERPL-MCNC: 4 MMOL/L — SIGNIFICANT CHANGE UP (ref 3.5–5)
POTASSIUM SERPL-SCNC: 4 MMOL/L — SIGNIFICANT CHANGE UP (ref 3.5–5)
SODIUM SERPL-SCNC: 142 MMOL/L — SIGNIFICANT CHANGE UP (ref 135–146)

## 2023-05-18 PROCEDURE — 71046 X-RAY EXAM CHEST 2 VIEWS: CPT | Mod: 26

## 2023-05-18 PROCEDURE — 93280 PM DEVICE PROGR EVAL DUAL: CPT | Mod: 26

## 2023-05-18 PROCEDURE — 99233 SBSQ HOSP IP/OBS HIGH 50: CPT

## 2023-05-18 RX ORDER — CEPHALEXIN 500 MG
1 CAPSULE ORAL
Qty: 10 | Refills: 0
Start: 2023-05-18 | End: 2023-05-22

## 2023-05-18 RX ADMIN — Medication 145 MILLIGRAM(S): at 11:04

## 2023-05-18 RX ADMIN — Medication 81 MILLIGRAM(S): at 11:04

## 2023-05-18 RX ADMIN — TICAGRELOR 90 MILLIGRAM(S): 90 TABLET ORAL at 11:04

## 2023-05-18 RX ADMIN — LISINOPRIL 40 MILLIGRAM(S): 2.5 TABLET ORAL at 05:45

## 2023-05-18 NOTE — PROGRESS NOTE ADULT - ASSESSMENT
EP: Navi  Cards: Jose    77 yo male  with history of CAD; MI in 1998 , sp POBA,  sp PCI with X2 stent to LCX in 2021,  HTN, HLD, DM II, seasonal allergies. He presented to [facility] for admission due to CHB noted on ILR ( implanted on 9/28/2022). Pt underwent DC PPM ("[employer] with loop explant on 5/17/2023, no immediate postop complications noted.     Impression:  CHB s/p Dc PPM ([employer]) on 5/17/2023  CAD  HTN  HLD  DM    Plan:  - Interrogation complete: DDD  bpm. No events, normal functioning device.   - CXR noted  - EKG   - HOLD any heparin, lovenox or DOACs for 48 hr following procedure to minimize risk of hematoma  - May resume PO diet  - Cont current care  - May resume beta blocker tomorrow  - Tylenol for pain  - Ambulate as tolerated  - Keflex 500 mg PO Q12 x 5 days for postop ppx    Discharge Instructions:  - No heavy lifting > 5 lbs. for 4-6 weeks  - Do not raise your arm above shoulder level for 4-6 weeks.   - Do not shower for 5 days, may resume on Monday  - No submerging in water for 1 month  - Leave steri-strips in place, they will fall off on their own  - No driving for 1 week  
Assessment	  Patient is a 79 yo male  with history of CAD; MI in 1998  sp POBA,  sp PCI with X2 stent to LCX in 2021,  HTN, HLD, DMII, seasonal allergies. He presented to [facility] for admission due to CHB noted on ILR ( implanted on 9/28/2022 ).   Due to CHB and risk for progression and need for beta blockers to manage PVC burden, pacemaker implant and ILR explant was recommended. Plan for ppm implant 5/17th by Dr. Mcelroy.     Impression & Plan:    #CHB   -admit to cardio tele   - cont to keep pacer pads on patient   - EP consult , ( Dr. Mcelroy sent patient in for admission for plan of PPM implant)  - keep NPO for  PPM implant today  - f/u AM pre-op Labs: BMP, CBC, COAGS , T& S,   - f/u tsh + free t4, 2nd set of troponin level  - obtain AM ecg   - keep magnesium >2, K-serum >4   - hold all Anticoagulants    #CAD SP pci x2 BECCA in 2021 Lcx  # HTN   - Cont on ASA 81MG . brilinta  - cont on ACEi, inpatient on lisinopril 40mg ( home med ramipril 10mg)  -  Cont to HOLD AVN blocking agent ( was on coreg , stopped in april, 2023)   - monitor VS per routine     #HLD  - cont on Statin, inpatient on atorvastatin 40mg  (home med takes simvastatin 40mg)   - Cont on fenofibrate 145mg     # DMII  - ISS while inpatient ( home med Janumet on hold)   - Check F.S AC & HS

## 2023-05-18 NOTE — PROGRESS NOTE ADULT - SUBJECTIVE AND OBJECTIVE BOX
Chief complaint: Patient is a 78y old  Male who presents with a chief complaint of CHB noted on ILR (17 May 2023 10:47)    Interval history:    Review of systems: A complete 10-point review of systems was obtained and is negative except as stated in the interval history.    Vitals:  T(F): 96.5, Max: 97.7 ( @ 00:33)  HR: 64 (53 - 97)  BP: 145/65 (133/63 - 172/77)  RR: 20 (14 - 24)  SpO2: 99% (98% - 99%)    Ins & outs:      @ 07:01  -   @ 07:00  --------------------------------------------------------  IN: 40 mL / OUT: 300 mL / NET: -260 mL      Weight trend:  Weight (kg): 74.3 ()    Physical exam:  General: No apparent distress  HEENT: Anicteric sclera. Moist mucous membranes. JVP *** cm.   Cardiac: Regular rate and rhythm. No murmurs, rubs, or gallops.   Vascular: Symmetric radial pulses. Dorsalis pedis pulses palpable.   Respiratory: Normal effort. Bibasilar crackles. Clear to ascultation.   Abdomen: Soft, nontender. Audible bowel sounds.   Extremities: Warm with *** edema. No cyanosis or clubbing.   Skin: Warm and dry. No rash.   Neurologic: Grossly normal motor function.   Psychiatric: Oriented to person, place, and time.     Data reviewed:  - Telemetry:   - ECG (date***):   - TTE (date***):   - Chest x-ray (date***):   - Stress test:   - CCTA:  - Cardiac catheterization:  - Cardiac MRI:    - Labs:                        10.1   4.50  )-----------( 256      ( 17 May 2023 06:49 )             31.3         140  |  105  |  15  ----------------------------<  119<H>  4.4   |  25  |  1.3    Ca    9.0      17 May 2023 06:49  Mg     1.9         TPro  6.1  /  Alb  3.7  /  TBili  0.4  /  DBili  x   /  AST  18  /  ALT  13  /  AlkPhos  40      PT/INR - ( 17 May 2023 06:49 )   PT: 10.80 sec;   INR: 0.95 ratio         PTT - ( 17 May 2023 06:49 )  PTT:28.3 sec  Troponin T, Serum: 0.01 ng/mL (23 @ 06:49)  Troponin T, Serum: <0.01 ng/mL (23 @ 21:31)          Thyroid Stimulating Hormone, Serum: 1.36 uIU/mL (23 @ 06:49)    Urinalysis Basic - ( 17 May 2023 05:15 )    Color: Light Yellow / Appearance: Clear / S.011 / pH: x  Gluc: x / Ketone: Negative  / Bili: Negative / Urobili: <2 mg/dL   Blood: x / Protein: Negative / Nitrite: Negative   Leuk Esterase: Negative / RBC: x / WBC x   Sq Epi: x / Non Sq Epi: x / Bacteria: x        Medications:  aspirin enteric coated 81 milliGRAM(s) Oral daily  atorvastatin 40 milliGRAM(s) Oral at bedtime  dextrose 50% Injectable 25 Gram(s) IV Push once  dextrose 50% Injectable 25 Gram(s) IV Push once  dextrose 50% Injectable 12.5 Gram(s) IV Push once  fenofibrate Tablet 145 milliGRAM(s) Oral daily  glucagon  Injectable 1 milliGRAM(s) IntraMuscular once  insulin lispro (ADMELOG) corrective regimen sliding scale   SubCutaneous three times a day before meals  insulin lispro (ADMELOG) corrective regimen sliding scale   SubCutaneous at bedtime  lisinopril 40 milliGRAM(s) Oral daily  ticagrelor 90 milliGRAM(s) Oral every 12 hours    Drips:  dextrose 5%. 1000 milliLiter(s) (100 mL/Hr) IV Continuous <Continuous>  dextrose 5%. 1000 milliLiter(s) (50 mL/Hr) IV Continuous <Continuous>    PRN:     Allergies    contrast media (iodine-based) (Unknown)    Intolerances      Assessment:      Problems discussed and associated plan:      Please contact me with any questions or concerns at [phone].
INTERVAL HPI/OVERNIGHT EVENTS:  Pt is POD #1 PPM and loop explant. Tele with episodes of tachycardia to 180s- not accurate as tele is double counting T waves. No true tele events noted.   Patient denies fever, chills, dizziness, syncope, chest pain, palpitations, SOB, cough, abd pain, n/v/d    MEDICATIONS  (STANDING):  aspirin enteric coated 81 milliGRAM(s) Oral daily  atorvastatin 40 milliGRAM(s) Oral at bedtime  fenofibrate Tablet 145 milliGRAM(s) Oral daily  glucagon  Injectable 1 milliGRAM(s) IntraMuscular once  insulin lispro (ADMELOG) corrective regimen sliding scale   SubCutaneous three times a day before meals  insulin lispro (ADMELOG) corrective regimen sliding scale   SubCutaneous at bedtime  lisinopril 40 milliGRAM(s) Oral daily  ticagrelor 90 milliGRAM(s) Oral every 12 hours    MEDICATIONS  (PRN):  acetaminophen     Tablet .. 650 milliGRAM(s) Oral every 6 hours PRN Mild Pain (1 - 3)  albuterol    90 MICROgram(s) HFA Inhaler 2 Puff(s) Inhalation every 6 hours PRN Shortness of Breath and/or Wheezing  dextrose Oral Gel 15 Gram(s) Oral once PRN Blood Glucose LESS THAN 70 milliGRAM(s)/deciliter  melatonin 3 milliGRAM(s) Oral at bedtime PRN Insomnia      Allergies  contrast media (iodine-based) (Unknown)    REVIEW OF SYSTEMS    [x] A ten-point review of systems was otherwise negative except as noted.  [ ] Due to altered mental status/intubation, subjective information were not able to be obtained from the patient. History was obtained, to the extent possible, from review of the chart and collateral sources of information.      Vital Signs Last 24 Hrs  T(C): 36.5 (18 May 2023 07:48), Max: 37.1 (17 May 2023 23:55)  T(F): 97.7 (18 May 2023 07:48), Max: 98.7 (17 May 2023 23:55)  HR: 76 (18 May 2023 07:48) (64 - 107)  BP: 154/74 (18 May 2023 07:48) (126/76 - 154/74)  BP(mean): 106 (18 May 2023 07:48) (92 - 106)  RR: 19 (18 May 2023 07:48) (17 - 20)  SpO2: 98% (18 May 2023 07:48) (96% - 99%)        23 @ 07:01  -  23 @ 07:00  --------------------------------------------------------  IN: 120 mL / OUT: 1050 mL / NET: -930 mL    Physical Exam  GENERAL: In no apparent distress, well nourished, and hydrated.  HEART: Regular rate and rhythm; No murmurs, rubs, or gallops.  PULMONARY: Clear to auscultation and perfusion.  No rales, wheezing, or rhonchi bilaterally.  CHEST: surgical incision without swelling or drainage noted.   ABDOMEN: Soft, Nontender, Nondistended; Bowel sounds present  EXTREMITIES:  2+ Peripheral Pulses, No clubbing, cyanosis, or edema  NEUROLOGICAL: AO x4 ,BAY, speech clear    LABS:                        10.1   4.50  )-----------( 256      ( 17 May 2023 06:49 )             31.3     05-18    142  |  107  |  16  ----------------------------<  158<H>  4.0   |  24  |  1.5    Ca    8.4      18 May 2023 06:28  Mg     1.8         TPro  6.1  /  Alb  3.7  /  TBili  0.4  /  DBili  x   /  AST  18  /  ALT  13  /  AlkPhos  40  05-17    PT/INR - ( 17 May 2023 06:49 )   PT: 10.80 sec;   INR: 0.95 ratio         PTT - ( 17 May 2023 06:49 )  PTT:28.3 sec  Urinalysis Basic - ( 17 May 2023 05:15 )    Color: Light Yellow / Appearance: Clear / S.011 / pH: x  Gluc: x / Ketone: Negative  / Bili: Negative / Urobili: <2 mg/dL   Blood: x / Protein: Negative / Nitrite: Negative   Leuk Esterase: Negative / RBC: x / WBC x   Sq Epi: x / Non Sq Epi: x / Bacteria: x    23 @ 07:01  -  23 @ 07:00  --------------------------------------------------------  IN: 120 mL / OUT: 1050 mL / NET: -930 mL    23 @ 07:01  -  23 @ 07:00  --------------------------------------------------------  IN: 120 mL / OUT: 1050 mL / NET: -930 mL      RADIOLOGY:  < from: TTE Echo Complete w/o Contrast w/ Doppler (23 @ 08:13) >  Summary:   1. Left ventricular ejection fraction, by visual estimation, is 50 to   55%.   2. Mildly increased LV wall thickness.   3. Spectral Doppler shows impaired relaxation pattern of left   ventricular myocardial filling (Grade I diastolic dysfunction).   4. Normal left atrial size.   5. Normal right atrial size.   6. Mild mitral annular calcification.   7. No evidence of mitral valve regurgitation.   8. Mild tricuspid regurgitation.    < end of copied text >  
Chief complaint: Patient is a 78y old  Male who presents with a chief complaint of CHB noted on ILR (17 May 2023 13:13)    Interval history: Patient seen and examined at bedside. Patient is NPO for PPM today.      Review of systems: A complete 10-point review of systems was obtained and is negative except as stated in the interval history.    Vitals:  T(F): 96.5, Max: 97.7 ( @ 00:33)  HR: 64 (53 - 97)  BP: 145/65 (133/63 - 172/77)  RR: 20 (14 - 24)  SpO2: 99% (98% - 99%)    Ins & outs:     - @ 07:01  -   @ 07:00  --------------------------------------------------------  IN: 40 mL / OUT: 300 mL / NET: -260 mL      Weight trend:  Weight (kg): 74.3 ()    Physical exam:  General: No apparent distress  HEENT: Anicteric sclera. Moist mucous membranes. JVD-  Cardiac: Regular rate and rhythm. No murmurs, rubs, or gallops.   Vascular: Symmetric radial pulses. Dorsalis pedis pulses palpable.   Respiratory: Normal effort. Clear to ascultation.   Abdomen: Soft, nontender. Audible bowel sounds.   Extremities: Warm with no edema. No cyanosis or clubbing.   Skin: Warm and dry. No rash.   Neurologic: Grossly normal motor function.   Psychiatric: Oriented to person, place, and time.     Data reviewed:  - Telemetry: SR with NSVT 4beats overnight  - ECG < from: 12 Lead ECG (23 @ 13:30) >  Diagnosis Line Sinus rhythm with 1st degree A-V block with Fusion complexes and Premature  atrial complexes with Aberrant conduction  Right bundle branch block  Left posterior fascicular block  *** Bifascicular block ***  Possible Inferior infarct , age undetermined  Abnormal ECG    < end of copied text >    - TTE < from: TTE Echo Complete w/o Contrast w/ Doppler (23 @ 08:13) >      Summary:   1. Left ventricular ejection fraction, by visual estimation, is 50 to   55%.   2. Mildly increased LV wall thickness.   3. Spectral Doppler shows impaired relaxation pattern of left   ventricular myocardial filling (Grade I diastolic dysfunction).   4. Normal left atrial size.   5. Normal right atrial size.   6. Mild mitral annular calcification.   7. No evidence of mitral valve regurgitation.   8. Mild tricuspid regurgitation.    < end of copied text >    - Chest x-ray < from: Xray Chest 1 View- PORTABLE-Urgent (23 @ 21:56) >  FINDINGS/  IMPRESSION:    No focal consolidation, pneumothorax or pleural effusion.    Stable cardiomediastinal silhouette.    No radiographically evident acute displaced fracture within the   limitations of this exam.    < end of copied text >  (date***):   - Stress test:   - CCTA:< from: NM Nuclear Stress Pharmacologic Multiple (23 @ 12:34) >  Impression:  1. No evidence for ischemia during Lexiscan infusion.  2. Fixed defect in the apical wall of the left ventricle which does not   completely thicken consistent with prior injury (scar).  3. Normal global left ventricular wall motion. All other walls of the   left ventricle thicken.  4. Calculated left ventricular ejection fractionof  49% which is below   the lower limits of normal is 50%.    < end of copied text >    - Cardiac catheterization:  - Cardiac MRI:    - Labs:                        10.1   4.50  )-----------( 256      ( 17 May 2023 06:49 )             31.3         140  |  105  |  15  ----------------------------<  119<H>  4.4   |  25  |  1.3    Ca    9.0      17 May 2023 06:49  Mg     1.9         TPro  6.1  /  Alb  3.7  /  TBili  0.4  /  DBili  x   /  AST  18  /  ALT  13  /  AlkPhos  40      PT/INR - ( 17 May 2023 06:49 )   PT: 10.80 sec;   INR: 0.95 ratio         PTT - ( 17 May 2023 06:49 )  PTT:28.3 sec  Troponin T, Serum: 0.01 ng/mL (23 @ 06:49)  Troponin T, Serum: <0.01 ng/mL (23 @ 21:31)          Thyroid Stimulating Hormone, Serum: 1.36 uIU/mL (23 @ 06:49)    Urinalysis Basic - ( 17 May 2023 05:15 )    Color: Light Yellow / Appearance: Clear / S.011 / pH: x  Gluc: x / Ketone: Negative  / Bili: Negative / Urobili: <2 mg/dL   Blood: x / Protein: Negative / Nitrite: Negative   Leuk Esterase: Negative / RBC: x / WBC x   Sq Epi: x / Non Sq Epi: x / Bacteria: x        Medications:  aspirin enteric coated 81 milliGRAM(s) Oral daily  atorvastatin 40 milliGRAM(s) Oral at bedtime  ceFAZolin   IVPB 1000 milliGRAM(s) IV Intermittent once  ceFAZolin   IVPB 2000 milliGRAM(s) IV Intermittent once  dextrose 50% Injectable 25 Gram(s) IV Push once  dextrose 50% Injectable 25 Gram(s) IV Push once  dextrose 50% Injectable 12.5 Gram(s) IV Push once  fenofibrate Tablet 145 milliGRAM(s) Oral daily  glucagon  Injectable 1 milliGRAM(s) IntraMuscular once  insulin lispro (ADMELOG) corrective regimen sliding scale   SubCutaneous three times a day before meals  insulin lispro (ADMELOG) corrective regimen sliding scale   SubCutaneous at bedtime  lisinopril 40 milliGRAM(s) Oral daily  ticagrelor 90 milliGRAM(s) Oral every 12 hours    Drips:  dextrose 5%. 1000 milliLiter(s) (50 mL/Hr) IV Continuous <Continuous>  dextrose 5%. 1000 milliLiter(s) (100 mL/Hr) IV Continuous <Continuous>    PRN:     Allergies    contrast media (iodine-based) (Unknown)    Intolerances

## 2023-05-19 ENCOUNTER — APPOINTMENT (OUTPATIENT)
Age: 78
End: 2023-05-19

## 2023-05-22 ENCOUNTER — APPOINTMENT (OUTPATIENT)
Age: 78
End: 2023-05-22

## 2023-05-24 DIAGNOSIS — I10 ESSENTIAL (PRIMARY) HYPERTENSION: ICD-10-CM

## 2023-05-24 DIAGNOSIS — E78.5 HYPERLIPIDEMIA, UNSPECIFIED: ICD-10-CM

## 2023-05-24 DIAGNOSIS — I44.2 ATRIOVENTRICULAR BLOCK, COMPLETE: ICD-10-CM

## 2023-05-24 DIAGNOSIS — E11.9 TYPE 2 DIABETES MELLITUS WITHOUT COMPLICATIONS: ICD-10-CM

## 2023-05-24 DIAGNOSIS — Z95.5 PRESENCE OF CORONARY ANGIOPLASTY IMPLANT AND GRAFT: ICD-10-CM

## 2023-05-24 DIAGNOSIS — I25.2 OLD MYOCARDIAL INFARCTION: ICD-10-CM

## 2023-05-24 DIAGNOSIS — I25.10 ATHEROSCLEROTIC HEART DISEASE OF NATIVE CORONARY ARTERY WITHOUT ANGINA PECTORIS: ICD-10-CM

## 2023-05-26 ENCOUNTER — APPOINTMENT (OUTPATIENT)
Age: 78
End: 2023-05-26

## 2023-05-31 ENCOUNTER — APPOINTMENT (OUTPATIENT)
Dept: ELECTROPHYSIOLOGY | Facility: HOSPITAL | Age: 78
End: 2023-05-31

## 2023-06-02 ENCOUNTER — APPOINTMENT (OUTPATIENT)
Age: 78
End: 2023-06-02

## 2023-06-05 ENCOUNTER — APPOINTMENT (OUTPATIENT)
Age: 78
End: 2023-06-05

## 2023-06-19 ENCOUNTER — APPOINTMENT (OUTPATIENT)
Dept: ELECTROPHYSIOLOGY | Facility: CLINIC | Age: 78
End: 2023-06-19
Payer: MEDICARE

## 2023-06-19 VITALS
SYSTOLIC BLOOD PRESSURE: 140 MMHG | DIASTOLIC BLOOD PRESSURE: 76 MMHG | HEIGHT: 64 IN | TEMPERATURE: 97.1 F | RESPIRATION RATE: 16 BRPM | BODY MASS INDEX: 1.75 KG/M2 | HEART RATE: 87 BPM | WEIGHT: 10.25 LBS

## 2023-06-19 DIAGNOSIS — Z45.09 ENCOUNTER FOR ADJUSTMENT AND MANAGEMENT OF OTHER CARDIAC DEVICE: ICD-10-CM

## 2023-06-19 DIAGNOSIS — Z98.890 OTHER SPECIFIED POSTPROCEDURAL STATES: ICD-10-CM

## 2023-06-19 DIAGNOSIS — Z48.89 ENCOUNTER FOR OTHER SPECIFIED SURGICAL AFTERCARE: ICD-10-CM

## 2023-06-19 PROCEDURE — 99024 POSTOP FOLLOW-UP VISIT: CPT

## 2023-06-19 PROCEDURE — 93280 PM DEVICE PROGR EVAL DUAL: CPT

## 2023-06-19 RX ORDER — ASPIRIN 81 MG/1
81 TABLET, CHEWABLE ORAL
Qty: 30 | Refills: 2 | Status: ACTIVE | COMMUNITY
Start: 2020-02-10

## 2023-06-19 NOTE — ASSESSMENT
[FreeTextEntry1] : Cardiac h/o CAD, MI, PCI with stent in 2021, frequent PVC, c/o palpitations. s/p ILR implant which revealed PVC burden 9%, pt unable to tolerated CCBs, now s/p DC-PPM. The pt presents today for initial wound check and device interrogation.\par \par Today the patient is feeling generally well with no acute complaints. They deny CP, SOB, palpitations, dizziness, syncope. Pt states he goes back and forth from [place] to [place] but only 2-3 days at a time. \par \par # PVCs, BB use, CHB s/p DC-PPM\par - PPM Incision site healing well, clean, dry, no drainage, no redness, no bruising. I discussed with patient post-operative care in great details. I answered all questions to their satisfaction. Patient was pleased with the result of their procedure. Advised pt to avoid carrying more than 5 lb and lifting his left arm above his shoulder for a total of 6 weeks from procedure date. Also advised pt to avoid fishing, swimming and golfing for a total of 3 mo to avoid lead dislodgment.\par - ILR explant site well healed, clean, dry, no drainage, no redness, no bruising.\par - Device interrogated and reprogrammed as described in procedure. Device function normal. All parameters stable. No events. See Device Printout\par - Coreg 3.125mg BID was re-started post PPM implant. Rx refill sent to pharmacy.\par \par # Remote monitoring - patient is enrolled\par - Remote monitoring was discussed with patient, schedule, process, as well as associated co-pay that may not be covered by their insurance.\par - Pt travels to and from [place], I explained pt could bring home remote monitor with him if he will be gone for more than 2wks.\par \par \par RTO in 6-7 mo/PRN\par \par \par I have also advised the patient to go to the nearest emergency room if he experiences any chest pain, dyspnea, syncope, or has any other compelling symptoms.

## 2023-06-19 NOTE — CARDIOLOGY SUMMARY
[de-identified] : 4/7/2023: sinus rhythm 74 bpm 1AVB, 3 PVCs, IVCD\par 4- NSR with first degree AV block IVCD rightward axis IVCD \par 4-192022 NSR IVCD PVC's \par 8/26/2022 NSR 77 bpm  1st degre AV block IVCD\par 10/26/2022 NSR with 1st degree AVB , TN 316ms left posterior Fascicular block, QRS 134ms occ PVC [de-identified] : 3-2-2021 PVC's NS VT \par 7-2021 PVC's No VT . 12% PVC buden [de-identified] : 3- Lexiscan stress test inferior ischemia . \par 9- Mild apical ischemia .  [de-identified] : 4- Normal LV systolic function mild MR and Mild TR . \par 5-4-2021 EF 56% TraceMR trace TR mild AI \par 8- Normal LV systolic function Trace MR mild TR mild enlargement of the sinus of Valsalva and ascending aorta  [de-identified] : 05/17/2023: DC-PPM ([employer]) implanted\par 09/28/2022: ILR implanted, explanted 05/17/2023. [de-identified] : 4- LAD 70% stenosis not signficnt by FFR  70% lesion int eh prox LCX and 95% lesion in the distal LCX and 70-80% proc RCA \par 3.x12 Xience stent BECCA in prox LCX \par 3.5x 33 Xience BECCA in the distal lesion  [de-identified] : 10-7-2019 Mild Carotid disease bilaterally .

## 2023-06-19 NOTE — HISTORY OF PRESENT ILLNESS
[FreeTextEntry1] : He had frequent PVC's up to 7% burden. He did have 1 4 beat run of VT. He was on Coreg in the past which had been stopped for IVCD with prolonged first degree AV block, now s/p DC-PPM implanted on 05/17/2023 and pt was restarted on Coreg. The patient has CAD. The patient had an MI in 1998 and had POBA at the time. He has had an old IWMI. Characterization show no significant new CAD.\par \par \par The patient had a cardiac cath performed. There was a 70 % lesion in the LAD which was not significant and severe proximal and distal LCX lesion . Had atherectomy and stent in the distal lesion and stent in the proximal lesion He has not had chest pain and feels well overall. He had seen by Dr. Mcelroy for his VT and frequent PVC's . The patient had a repeat MCOT monitor which showed 12% PVC's . The patient has 80% Proc RCA lesion which is heavily calcified . Nuclear stress showed only mild apical ischemia which is improved compared to prior nuclear stress test prior to his intervention. \par  \par \par Had sleep study November 2022 - was told it was okay.\par \par Cardiologist: Dr. Garcia\par PCP: Dr. Canada\par \par \par \par

## 2023-06-19 NOTE — PHYSICAL EXAM
[General Appearance - Well Developed] : well developed [Normal Appearance] : normal appearance [Well Groomed] : well groomed [General Appearance - Well Nourished] : well nourished [No Deformities] : no deformities [General Appearance - In No Acute Distress] : no acute distress [Heart Rate And Rhythm] : heart rate and rhythm were normal [Heart Sounds] : normal S1 and S2 [Murmurs] : no murmurs present [Respiration, Rhythm And Depth] : normal respiratory rhythm and effort [Exaggerated Use Of Accessory Muscles For Inspiration] : no accessory muscle use [Auscultation Breath Sounds / Voice Sounds] : lungs were clear to auscultation bilaterally [Clean] : clean [Dry] : dry [Abdomen Soft] : soft [Abdomen Tenderness] : non-tender [Abdomen Mass (___ Cm)] : no abdominal mass palpated [Nail Clubbing] : no clubbing of the fingernails [Cyanosis, Localized] : no localized cyanosis [Petechial Hemorrhages (___cm)] : no petechial hemorrhages [] : no ischemic changes [Left Infraclavicular] : left infraclavicular area [Well-Healed] : well-healed [FreeTextEntry2] : Site of ILR explant - Incision site healing well, clean, dry, no drainage, no redness, no bruising.

## 2023-06-19 NOTE — PROCEDURE
[No] : not [NSR] : normal sinus rhythm [Longevity: ___ months] : The estimated remaining battery life is [unfilled] months [DDD] : DDD [Magnet Rate: ___ Ppm] : magnet rate was [unfilled] Ppm [Threshold Testing Performed] : Threshold testing was performed [Sensing Amplitude ___mv] : sensing amplitude was [unfilled] mv [Lead Imp:  ___ohms] : lead impedance was [unfilled] ohms [___V @] : [unfilled] V [___ ms] : [unfilled] ms [None] : none [de-identified] : [place]ell [de-identified] : ACCOLADE MRI L311 [de-identified] : [id number] [de-identified] : 9/28/2022 [de-identified] : 60/130 [de-identified] : 8 YEARS [de-identified] : AP 21 %\par  28 %\par \par No events

## 2023-06-29 ENCOUNTER — APPOINTMENT (OUTPATIENT)
Dept: OTOLARYNGOLOGY | Facility: CLINIC | Age: 78
End: 2023-06-29
Payer: MEDICARE

## 2023-06-29 VITALS — WEIGHT: 165 LBS | BODY MASS INDEX: 28.32 KG/M2

## 2023-06-29 DIAGNOSIS — H93.12 TINNITUS, LEFT EAR: ICD-10-CM

## 2023-06-29 DIAGNOSIS — R22.0 LOCALIZED SWELLING, MASS AND LUMP, HEAD: ICD-10-CM

## 2023-06-29 DIAGNOSIS — R09.89 OTHER SPECIFIED SYMPTOMS AND SIGNS INVOLVING THE CIRCULATORY AND RESPIRATORY SYSTEMS: ICD-10-CM

## 2023-06-29 PROCEDURE — 31231 NASAL ENDOSCOPY DX: CPT

## 2023-06-29 NOTE — PHYSICAL EXAM
[Nasal Endoscopy Performed] : nasal endoscopy was performed, see procedure section for findings [de-identified] : edema  [Normal] : mucosa is normal [Midline] : trachea located in midline position

## 2023-06-29 NOTE — ASSESSMENT
[FreeTextEntry1] : Pt will continue on allergy medicine \par \par Risks, benefits, and alternatives of ablation and turbinate reduction were explained including but not limited to bleeding, infection, persistent symptoms, numbness, perforation, change in smell, change in taste, need for additional surgery, etc...\par

## 2023-06-29 NOTE — REASON FOR VISIT
[Subsequent Evaluation] : a subsequent evaluation for [FreeTextEntry2] : runny nose, tinnitus ,  left ear pop

## 2023-06-29 NOTE — HISTORY OF PRESENT ILLNESS
[FreeTextEntry1] : Patient presents today c/o runny nose, tinnitus ,  left ear pop. Patient states when he eats his nose starts runny and has a lot of excess mucus.  His left ear has not improved.  He still has tinnitus in his left ear and room spinning that lasts few seconds. He has occasional ear popping and  sensation of air blowing in his left ear.  He denies any  pain.

## 2023-06-29 NOTE — PROCEDURE
[None] : none [Rigid Endoscope] : examined with a rigid endoscope [Congested] : congested [Pale] : pale [Sinan] : sinan [Normal] : the paranasal sinuses had no abnormalities [FreeTextEntry6] : The following anatomic sites were directly examined in a sequential fashion:\par The scope was introduced in the nasal passage between the middle and inferior turbinates to exam the inferior portion of the middle meatus and the fontanelle, as well as the maxillary ostia. Next, the scope was passed medically and posteriorly to the middle turbinates to examine the sphenoethmoid recess and the superior turbinate region.\par

## 2023-07-02 ENCOUNTER — APPOINTMENT (OUTPATIENT)
Dept: ELECTROPHYSIOLOGY | Facility: CLINIC | Age: 78
End: 2023-07-02

## 2023-07-21 NOTE — REVIEW OF SYSTEMS
Vascular surgery-- Progress Note    Subjective   Feeling very tired.  Has some stiffness to right foot from swelling.     Objective   Visit Vitals  /76 (BP Location: RUE - Right upper extremity, Patient Position: Semi-Miranda's)   Pulse 87   Temp 97.3 °F (36.3 °C) (Axillary)   Resp 16   Ht 5' 9\" (1.753 m)   Wt 91.5 kg (201 lb 11.5 oz)   SpO2 100%   BMI 29.79 kg/m²        Constitutional: Mildly obese, no respiratory distress,   Neurologic: Alert and oriented x 3,   Psychiatric: Speech normal rhythm, mood appropriately animated, and behavior appropriate.  Cardiovascular: Regular rate   Extremities:    RLE: +dp and pt signals    Incision:  Right distal toe dressing dry intact      Labs:  Recent Results (from the past 24 hour(s))   GLUCOSE, BEDSIDE - POINT OF CARE    Collection Time: 07/20/23 11:54 AM   Result Value Ref Range    GLUCOSE, BEDSIDE - POINT OF CARE 345 (H) 70 - 99 mg/dL   GLUCOSE, BEDSIDE - POINT OF CARE    Collection Time: 07/20/23  4:24 PM   Result Value Ref Range    GLUCOSE, BEDSIDE - POINT OF CARE 288 (H) 70 - 99 mg/dL   GLUCOSE, BEDSIDE - POINT OF CARE    Collection Time: 07/20/23  8:55 PM   Result Value Ref Range    GLUCOSE, BEDSIDE - POINT OF CARE 231 (H) 70 - 99 mg/dL   GLUCOSE, BEDSIDE - POINT OF CARE    Collection Time: 07/21/23 12:01 AM   Result Value Ref Range    GLUCOSE, BEDSIDE - POINT OF CARE 216 (H) 70 - 99 mg/dL   GLUCOSE, BEDSIDE - POINT OF CARE    Collection Time: 07/21/23  7:51 AM   Result Value Ref Range    GLUCOSE, BEDSIDE - POINT OF CARE 165 (H) 70 - 99 mg/dL   Basic Metabolic Panel    Collection Time: 07/21/23  8:26 AM   Result Value Ref Range    Fasting Status      Sodium 138 135 - 145 mmol/L    Potassium 3.7 3.4 - 5.1 mmol/L    Chloride 105 97 - 110 mmol/L    Carbon Dioxide 27 21 - 32 mmol/L    Anion Gap 10 7 - 19 mmol/L    Glucose 177 (H) 70 - 99 mg/dL    BUN 54 (H) 6 - 20 mg/dL    Creatinine 1.61 (H) 0.67 - 1.17 mg/dL    Glomerular Filtration Rate 44 (L) >=60    BUN/Cr 34  (H) 7 - 25    Calcium 8.5 8.4 - 10.2 mg/dL       Imaging:  No results found.    Assessment/Plan    77 year old male with PMHx of HTN, HLD, DM, HFrEF(29%) s/p ICD implant, COPD on home O2, PAD, BRIANNA, CAD s/p PCI who presents to the hospital w/ MRSA bacteremia w/ concern for infected ICD. Vascular surgery was consulted for evaluation of wound healing potential in the setting of PVD and 1st metatarsal osteomyelitis potentially requiring amputation. On exam, lower extremity macrovascular flow appears intact, confirmed w/ non-invasive imaging studies.          -was planned angiogram but postponed due to lead extraction and flap for chest wound  - Will plan for angiogram with possible endovascular intervention on 7/24  - continue wound care and weight bearing per podiatry  -discussed with dr kali Bhandari, CNP   7/21/2023      [Joint Pain] : joint pain [Negative] : Psychiatric

## 2023-08-24 ENCOUNTER — APPOINTMENT (OUTPATIENT)
Dept: CARDIOLOGY | Facility: CLINIC | Age: 78
End: 2023-08-24
Payer: MEDICARE

## 2023-08-24 VITALS
SYSTOLIC BLOOD PRESSURE: 122 MMHG | BODY MASS INDEX: 27.83 KG/M2 | HEART RATE: 72 BPM | DIASTOLIC BLOOD PRESSURE: 64 MMHG | WEIGHT: 163 LBS | HEIGHT: 64 IN

## 2023-08-24 DIAGNOSIS — I47.29 OTHER VENTRICULAR TACHYCARDIA: ICD-10-CM

## 2023-08-24 DIAGNOSIS — I21.9 ACUTE MYOCARDIAL INFARCTION, UNSPECIFIED: ICD-10-CM

## 2023-08-24 DIAGNOSIS — I47.1 SUPRAVENTRICULAR TACHYCARDIA: ICD-10-CM

## 2023-08-24 PROCEDURE — 99214 OFFICE O/P EST MOD 30 MIN: CPT | Mod: 25

## 2023-08-24 PROCEDURE — 93000 ELECTROCARDIOGRAM COMPLETE: CPT

## 2023-08-24 RX ORDER — METOPROLOL SUCCINATE 25 MG/1
25 TABLET, EXTENDED RELEASE ORAL
Qty: 90 | Refills: 3 | Status: ACTIVE | COMMUNITY
Start: 2023-08-24 | End: 1900-01-01

## 2023-08-24 NOTE — ASSESSMENT
[FreeTextEntry1] : The patient had CP in January which was atypical Nuclear stress test showed a fixed defect on the apical portion of the LV no ischemia . Echo showed EF 50-55% . The patient has PVC's and periods  of Mobitz I on MCOT with bradycardia. He does need to have beta blockers optimized and it was decided to have a PPM placed . This was placed . He was put back on Coreg .  His DM TG and LDL are not at goal.

## 2023-08-24 NOTE — DISCUSSION/SUMMARY
[___ Month(s)] : in [unfilled] month(s) [FreeTextEntry1] : D/C Coreg  Start toprol XL 25 mg qd  D/C Zocor  Start Atorvastatin 40 mg qd  Follow up in 2 months  Blood work . Follow up wiht PCP for DM management .

## 2023-08-24 NOTE — HISTORY OF PRESENT ILLNESS
[FreeTextEntry1] : The patient had a cardiac cath performed. There was a 70 % lesion in the LAD which was not significant and severe proximal and distal LCX lesion . Had atherectomy and stent in the distal lesion and stent in the proximal lesion He has not had chest pain and feels well overall. He had een seen by Dr. Mcelroy for his VT and frequent PVC's . . The patiient has 80% Prox RCA lesion which is heavily calcified . Nuclear stress showed  only mild apical ischemia which is improved compared to prior nuclear stress test prior to his intervention . The patient has had some dizziness when bending down and picking something up . The patient had another MCOT by Dr. cMelroy and was told of having Mobitz I and he had suggest to reimplant his loop monitor . There was a 14% PVC burden but no VT . The patient had a PPM placed . He states that he feels better

## 2023-09-18 ENCOUNTER — NON-APPOINTMENT (OUTPATIENT)
Age: 78
End: 2023-09-18

## 2023-09-18 ENCOUNTER — APPOINTMENT (OUTPATIENT)
Dept: CARDIOLOGY | Facility: CLINIC | Age: 78
End: 2023-09-18
Payer: MEDICARE

## 2023-09-18 PROCEDURE — 93294 REM INTERROG EVL PM/LDLS PM: CPT

## 2023-09-18 PROCEDURE — 93296 REM INTERROG EVL PM/IDS: CPT

## 2023-11-13 ENCOUNTER — APPOINTMENT (OUTPATIENT)
Dept: CARDIOLOGY | Facility: CLINIC | Age: 78
End: 2023-11-13
Payer: MEDICARE

## 2023-11-13 VITALS
SYSTOLIC BLOOD PRESSURE: 122 MMHG | TEMPERATURE: 97.6 F | HEART RATE: 70 BPM | WEIGHT: 160 LBS | HEIGHT: 64 IN | BODY MASS INDEX: 27.31 KG/M2 | DIASTOLIC BLOOD PRESSURE: 68 MMHG

## 2023-11-13 DIAGNOSIS — R00.2 PALPITATIONS: ICD-10-CM

## 2023-11-13 DIAGNOSIS — E11.9 TYPE 2 DIABETES MELLITUS W/OUT COMPLICATIONS: ICD-10-CM

## 2023-11-13 DIAGNOSIS — R94.31 ABNORMAL ELECTROCARDIOGRAM [ECG] [EKG]: ICD-10-CM

## 2023-11-13 DIAGNOSIS — R00.0 TACHYCARDIA, UNSPECIFIED: ICD-10-CM

## 2023-11-13 DIAGNOSIS — I25.10 ATHEROSCLEROTIC HEART DISEASE OF NATIVE CORONARY ARTERY W/OUT ANGINA PECTORIS: ICD-10-CM

## 2023-11-13 PROCEDURE — 99214 OFFICE O/P EST MOD 30 MIN: CPT | Mod: 25

## 2023-11-13 PROCEDURE — 93000 ELECTROCARDIOGRAM COMPLETE: CPT

## 2023-11-13 RX ORDER — GLIPIZIDE 5 MG/1
5 TABLET, FILM COATED, EXTENDED RELEASE ORAL
Refills: 0 | Status: DISCONTINUED | COMMUNITY
Start: 2023-08-24 | End: 2023-11-13

## 2023-11-13 RX ORDER — GLIPIZIDE 5 MG/1
5 TABLET ORAL
Refills: 0 | Status: ACTIVE | COMMUNITY
Start: 2023-11-13

## 2023-11-13 RX ORDER — B-COMPLEX WITH VITAMIN C
TABLET ORAL
Refills: 0 | Status: ACTIVE | COMMUNITY

## 2023-11-13 RX ORDER — TICAGRELOR 90 MG/1
90 TABLET ORAL TWICE DAILY
Qty: 180 | Refills: 3 | Status: ACTIVE | COMMUNITY
Start: 1900-01-01 | End: 1900-01-01

## 2023-11-13 RX ORDER — MOMETASONE 50 UG/1
50 SPRAY, METERED NASAL DAILY
Qty: 1 | Refills: 3 | Status: COMPLETED | COMMUNITY
Start: 2022-07-25 | End: 2023-11-13

## 2023-11-13 RX ORDER — GLIPIZIDE 2.5 MG/1
2.5 TABLET, FILM COATED, EXTENDED RELEASE ORAL
Refills: 0 | Status: DISCONTINUED | COMMUNITY
Start: 2023-08-24 | End: 2023-11-13

## 2023-11-17 RX ORDER — ATORVASTATIN CALCIUM 40 MG/1
40 TABLET, FILM COATED ORAL
Qty: 90 | Refills: 3 | Status: DISCONTINUED | COMMUNITY
Start: 2023-08-24 | End: 2023-11-17

## 2023-11-17 RX ORDER — ATORVASTATIN CALCIUM 80 MG/1
80 TABLET, FILM COATED ORAL
Qty: 90 | Refills: 3 | Status: ACTIVE | COMMUNITY
Start: 2023-11-17 | End: 1900-01-01

## 2023-11-25 NOTE — CARDIOLOGY SUMMARY
[___] : [unfilled] [de-identified] : 4- NSR with firt degree AV block IVCD rightward axis IVCD \par  4-192022 NSR IVCD PVC's \par  1-4-2022 NSR first degree AV block IVCD rightward axis . PVC . \par  4- NSR first degree AV block IVCD Cannot R/O Old IWMI  [de-identified] : 3-2-2021 PVC's NS VT \par  7-2021 PVC's No VT . 12% PVC buden [de-identified] : 3- Lexiscan stress test inferior ischemia . \par  9- Mild apical ischemia .\par  1-2023 Lexiscan stress test Fixed apical defect . No ischemai   [de-identified] : 4- Normal LV systolic function mild MR and Mild TR . \par  5-4-2021 EF 56% TraceMR trace TR mild AI \par  8- Normal LV systolic function Trace MR mild TR mild enlargement of the sinus of Valsalva and ascending aorta \par  1-2023 From [facility] EF 50-55% Mild TR  [de-identified] : 4- LAD 70% stenosis not signficnt by FFR  70% lesion int eh prox LCX and 95% lesion in the distal LCX and 70-80% proc RCA \par  3.x12 Xience stent BECCA in prox LCX \par  3.5x 33 Xience BECCA in the distal lesion  [de-identified] : 10-7-2019 Mild Carotid disease bilaterally .  no

## 2023-12-18 ENCOUNTER — APPOINTMENT (OUTPATIENT)
Dept: CARDIOLOGY | Facility: CLINIC | Age: 78
End: 2023-12-18
Payer: MEDICARE

## 2023-12-18 ENCOUNTER — NON-APPOINTMENT (OUTPATIENT)
Age: 78
End: 2023-12-18

## 2023-12-18 PROCEDURE — 93298 REM INTERROG DEV EVAL SCRMS: CPT

## 2023-12-18 PROCEDURE — G2066: CPT

## 2023-12-19 ENCOUNTER — APPOINTMENT (OUTPATIENT)
Dept: UROLOGY | Facility: CLINIC | Age: 78
End: 2023-12-19

## 2023-12-20 ENCOUNTER — OUTPATIENT (OUTPATIENT)
Dept: OUTPATIENT SERVICES | Facility: HOSPITAL | Age: 78
LOS: 1 days | End: 2023-12-20
Payer: MEDICARE

## 2023-12-20 ENCOUNTER — APPOINTMENT (OUTPATIENT)
Age: 78
End: 2023-12-20

## 2023-12-20 DIAGNOSIS — Z98.890 OTHER SPECIFIED POSTPROCEDURAL STATES: Chronic | ICD-10-CM

## 2023-12-20 DIAGNOSIS — I44.2 ATRIOVENTRICULAR BLOCK, COMPLETE: ICD-10-CM

## 2023-12-20 DIAGNOSIS — I25.10 ATHEROSCLEROTIC HEART DISEASE OF NATIVE CORONARY ARTERY WITHOUT ANGINA PECTORIS: ICD-10-CM

## 2023-12-20 PROCEDURE — 93798 PHYS/QHP OP CAR RHAB W/ECG: CPT

## 2023-12-21 DIAGNOSIS — I25.10 ATHEROSCLEROTIC HEART DISEASE OF NATIVE CORONARY ARTERY WITHOUT ANGINA PECTORIS: ICD-10-CM

## 2023-12-21 DIAGNOSIS — I44.2 ATRIOVENTRICULAR BLOCK, COMPLETE: ICD-10-CM

## 2023-12-27 ENCOUNTER — APPOINTMENT (OUTPATIENT)
Age: 78
End: 2023-12-27

## 2023-12-27 ENCOUNTER — OUTPATIENT (OUTPATIENT)
Dept: OUTPATIENT SERVICES | Facility: HOSPITAL | Age: 78
LOS: 1 days | End: 2023-12-27

## 2023-12-27 DIAGNOSIS — I44.2 ATRIOVENTRICULAR BLOCK, COMPLETE: ICD-10-CM

## 2023-12-27 DIAGNOSIS — Z98.890 OTHER SPECIFIED POSTPROCEDURAL STATES: Chronic | ICD-10-CM

## 2023-12-27 DIAGNOSIS — I25.10 ATHEROSCLEROTIC HEART DISEASE OF NATIVE CORONARY ARTERY WITHOUT ANGINA PECTORIS: ICD-10-CM

## 2023-12-29 ENCOUNTER — APPOINTMENT (OUTPATIENT)
Age: 78
End: 2023-12-29

## 2024-01-03 ENCOUNTER — APPOINTMENT (OUTPATIENT)
Age: 79
End: 2024-01-03

## 2024-01-03 ENCOUNTER — OUTPATIENT (OUTPATIENT)
Dept: OUTPATIENT SERVICES | Facility: HOSPITAL | Age: 79
LOS: 1 days | End: 2024-01-03
Payer: MEDICARE

## 2024-01-03 DIAGNOSIS — I25.10 ATHEROSCLEROTIC HEART DISEASE OF NATIVE CORONARY ARTERY WITHOUT ANGINA PECTORIS: ICD-10-CM

## 2024-01-03 DIAGNOSIS — I44.2 ATRIOVENTRICULAR BLOCK, COMPLETE: ICD-10-CM

## 2024-01-03 DIAGNOSIS — Z98.890 OTHER SPECIFIED POSTPROCEDURAL STATES: Chronic | ICD-10-CM

## 2024-01-03 LAB
GLUCOSE BLDC GLUCOMTR-MCNC: 354 MG/DL — HIGH (ref 70–99)
GLUCOSE BLDC GLUCOMTR-MCNC: 354 MG/DL — HIGH (ref 70–99)

## 2024-01-03 PROCEDURE — 93798 PHYS/QHP OP CAR RHAB W/ECG: CPT

## 2024-01-03 PROCEDURE — 82962 GLUCOSE BLOOD TEST: CPT

## 2024-01-04 DIAGNOSIS — I25.10 ATHEROSCLEROTIC HEART DISEASE OF NATIVE CORONARY ARTERY WITHOUT ANGINA PECTORIS: ICD-10-CM

## 2024-01-04 DIAGNOSIS — I44.2 ATRIOVENTRICULAR BLOCK, COMPLETE: ICD-10-CM

## 2024-01-05 ENCOUNTER — APPOINTMENT (OUTPATIENT)
Age: 79
End: 2024-01-05

## 2024-01-08 ENCOUNTER — APPOINTMENT (OUTPATIENT)
Age: 79
End: 2024-01-08

## 2024-01-10 ENCOUNTER — OUTPATIENT (OUTPATIENT)
Dept: OUTPATIENT SERVICES | Facility: HOSPITAL | Age: 79
LOS: 1 days | End: 2024-01-10

## 2024-01-10 ENCOUNTER — APPOINTMENT (OUTPATIENT)
Age: 79
End: 2024-01-10

## 2024-01-10 DIAGNOSIS — I44.2 ATRIOVENTRICULAR BLOCK, COMPLETE: ICD-10-CM

## 2024-01-10 DIAGNOSIS — I25.10 ATHEROSCLEROTIC HEART DISEASE OF NATIVE CORONARY ARTERY WITHOUT ANGINA PECTORIS: ICD-10-CM

## 2024-01-10 DIAGNOSIS — Z98.890 OTHER SPECIFIED POSTPROCEDURAL STATES: Chronic | ICD-10-CM

## 2024-01-12 ENCOUNTER — APPOINTMENT (OUTPATIENT)
Age: 79
End: 2024-01-12

## 2024-01-15 ENCOUNTER — APPOINTMENT (OUTPATIENT)
Age: 79
End: 2024-01-15

## 2024-01-15 ENCOUNTER — OUTPATIENT (OUTPATIENT)
Dept: OUTPATIENT SERVICES | Facility: HOSPITAL | Age: 79
LOS: 1 days | End: 2024-01-15

## 2024-01-15 DIAGNOSIS — I25.10 ATHEROSCLEROTIC HEART DISEASE OF NATIVE CORONARY ARTERY WITHOUT ANGINA PECTORIS: ICD-10-CM

## 2024-01-15 DIAGNOSIS — I44.2 ATRIOVENTRICULAR BLOCK, COMPLETE: ICD-10-CM

## 2024-01-15 DIAGNOSIS — Z98.890 OTHER SPECIFIED POSTPROCEDURAL STATES: Chronic | ICD-10-CM

## 2024-01-15 LAB
GLUCOSE BLDC GLUCOMTR-MCNC: 325 MG/DL — HIGH (ref 70–99)
GLUCOSE BLDC GLUCOMTR-MCNC: 325 MG/DL — HIGH (ref 70–99)

## 2024-01-17 ENCOUNTER — APPOINTMENT (OUTPATIENT)
Age: 79
End: 2024-01-17

## 2024-01-19 ENCOUNTER — APPOINTMENT (OUTPATIENT)
Age: 79
End: 2024-01-19

## 2024-01-22 ENCOUNTER — APPOINTMENT (OUTPATIENT)
Age: 79
End: 2024-01-22

## 2024-01-22 ENCOUNTER — OUTPATIENT (OUTPATIENT)
Dept: OUTPATIENT SERVICES | Facility: HOSPITAL | Age: 79
LOS: 1 days | End: 2024-01-22

## 2024-01-22 DIAGNOSIS — Z98.890 OTHER SPECIFIED POSTPROCEDURAL STATES: Chronic | ICD-10-CM

## 2024-01-22 DIAGNOSIS — I25.10 ATHEROSCLEROTIC HEART DISEASE OF NATIVE CORONARY ARTERY WITHOUT ANGINA PECTORIS: ICD-10-CM

## 2024-01-22 DIAGNOSIS — I44.2 ATRIOVENTRICULAR BLOCK, COMPLETE: ICD-10-CM

## 2024-01-24 ENCOUNTER — APPOINTMENT (OUTPATIENT)
Age: 79
End: 2024-01-24

## 2024-01-24 ENCOUNTER — OUTPATIENT (OUTPATIENT)
Dept: OUTPATIENT SERVICES | Facility: HOSPITAL | Age: 79
LOS: 1 days | End: 2024-01-24

## 2024-01-24 DIAGNOSIS — I44.2 ATRIOVENTRICULAR BLOCK, COMPLETE: ICD-10-CM

## 2024-01-24 DIAGNOSIS — Z98.890 OTHER SPECIFIED POSTPROCEDURAL STATES: Chronic | ICD-10-CM

## 2024-01-24 DIAGNOSIS — I25.10 ATHEROSCLEROTIC HEART DISEASE OF NATIVE CORONARY ARTERY WITHOUT ANGINA PECTORIS: ICD-10-CM

## 2024-01-26 ENCOUNTER — APPOINTMENT (OUTPATIENT)
Age: 79
End: 2024-01-26

## 2024-01-29 ENCOUNTER — APPOINTMENT (OUTPATIENT)
Age: 79
End: 2024-01-29

## 2024-01-30 ENCOUNTER — NON-APPOINTMENT (OUTPATIENT)
Age: 79
End: 2024-01-30

## 2024-01-31 ENCOUNTER — OUTPATIENT (OUTPATIENT)
Dept: OUTPATIENT SERVICES | Facility: HOSPITAL | Age: 79
LOS: 1 days | End: 2024-01-31

## 2024-01-31 ENCOUNTER — APPOINTMENT (OUTPATIENT)
Age: 79
End: 2024-01-31

## 2024-01-31 DIAGNOSIS — I44.2 ATRIOVENTRICULAR BLOCK, COMPLETE: ICD-10-CM

## 2024-01-31 DIAGNOSIS — I25.10 ATHEROSCLEROTIC HEART DISEASE OF NATIVE CORONARY ARTERY WITHOUT ANGINA PECTORIS: ICD-10-CM

## 2024-01-31 DIAGNOSIS — Z98.890 OTHER SPECIFIED POSTPROCEDURAL STATES: Chronic | ICD-10-CM

## 2024-02-02 ENCOUNTER — APPOINTMENT (OUTPATIENT)
Age: 79
End: 2024-02-02

## 2024-02-05 ENCOUNTER — APPOINTMENT (OUTPATIENT)
Age: 79
End: 2024-02-05

## 2024-02-06 ENCOUNTER — APPOINTMENT (OUTPATIENT)
Dept: UROLOGY | Facility: CLINIC | Age: 79
End: 2024-02-06
Payer: MEDICARE

## 2024-02-06 DIAGNOSIS — N40.0 BENIGN PROSTATIC HYPERPLASIA WITHOUT LOWER URINARY TRACT SYMPMS: ICD-10-CM

## 2024-02-06 DIAGNOSIS — N42.0 CALCULUS OF PROSTATE: ICD-10-CM

## 2024-02-06 DIAGNOSIS — N32.89 OTHER SPECIFIED DISORDERS OF BLADDER: ICD-10-CM

## 2024-02-06 PROCEDURE — 99214 OFFICE O/P EST MOD 30 MIN: CPT

## 2024-02-06 NOTE — ASSESSMENT
[FreeTextEntry1] : JAMES ESCOTO is a 79-year-old male with a hx of CAD with stents, DM, HTN who presents for consultation for bladder wall thickening, BPH, prostatic calcifications   We discussed that prostatic calcifications are fairly nonspecific finding.  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 His digital rectal exam was negative and he has stable non bothersome LUTS. PSA was low risk previously and recommending repeat PSA, UA UCx. I explained to the daughter who is a physician that if the PSA shows increased velocity or if it is elevated then we should obtain MRI of the prostate. Otherwise he will follow-up on an as-needed basis.  Watchful waiting of BPH.  He will inform us if his symptoms worsen Recommended follow-up w his PCP

## 2024-02-06 NOTE — HISTORY OF PRESENT ILLNESS
[FreeTextEntry1] : JAMES ESCOTO is a 79-year-old male with a hx of CAD with stents, DM, HTN who presents for consultation for bladder wall thickening, BPH, prostatic calcifications   Pt presents today with his daughter. Denies bothersome LUTS. He is satisfied with the flow and reports nocturia 2x. Denies flank pain, gross hematuria, dysuria or associated symptoms.   RBUS 08/23/2023 - images independently reviewed by me from [facility],  There is symmetrical wall thickening without any bladder tumor seen. Agree with calcifications in prostatic urethra.  IMPRESSION: Normal renal sonogram with no change. Slightly thickened bladder wall with normal pre and post void volume. Calcified nodule within the central zone of the prostate.  (Prostate measures 3.6 x 2.7 x 3.6 cm with a volume of 18.5 cc. Extensive calcified nodule within the central zone measuring 1.6 x 1.5 cm. Semina vesicles are normal.  previously,  RBUS 09/2022 images - No hydronephrosis, shadowing calculi or perinephric fluid. Mild thickening or trabeculation along bladder wall  but no lesion seen. There are prostatic calcifications . Prostate is enlarged , measuring about 72 cm  . On my read bladder wall thickness 0.19 cm with bladder distended.  No masses seen. No intravesical protrusion of the prostate.  Labs 09/2022 : creatinine 1.69 //GFR 42 //A1c - 6.6  PSA 09/22 : 0.51  U/A 08/22 - proteinuria , no microscopic hematuria or pyuria   Denies  PMH including previous kidney stones, recurrent UTIs.  Family History: No  malignancies Social History: pt from [place] speaks english , daughter is IM hospitalist at [employer]   Old notes reviewed: reviewed medication list , not on any diuretics , on DM medication Janumet

## 2024-02-06 NOTE — ADDENDUM
[FreeTextEntry1] : Patient's note was transcribed with the assistance of a medical scribe under the supervision of Dr. Castellon. I, Dr. Castellon, have reviewed the patient's chart and agree that it aligns with my medical decisions. Evelina Ortiz, our scribe, also served as a chaperone for physical examination purposes.

## 2024-02-07 ENCOUNTER — OUTPATIENT (OUTPATIENT)
Dept: OUTPATIENT SERVICES | Facility: HOSPITAL | Age: 79
LOS: 1 days | End: 2024-02-07
Payer: MEDICARE

## 2024-02-07 ENCOUNTER — APPOINTMENT (OUTPATIENT)
Age: 79
End: 2024-02-07

## 2024-02-07 DIAGNOSIS — Z98.890 OTHER SPECIFIED POSTPROCEDURAL STATES: Chronic | ICD-10-CM

## 2024-02-07 DIAGNOSIS — I44.2 ATRIOVENTRICULAR BLOCK, COMPLETE: ICD-10-CM

## 2024-02-07 DIAGNOSIS — I25.10 ATHEROSCLEROTIC HEART DISEASE OF NATIVE CORONARY ARTERY WITHOUT ANGINA PECTORIS: ICD-10-CM

## 2024-02-07 PROCEDURE — 93798 PHYS/QHP OP CAR RHAB W/ECG: CPT

## 2024-02-08 DIAGNOSIS — I25.10 ATHEROSCLEROTIC HEART DISEASE OF NATIVE CORONARY ARTERY WITHOUT ANGINA PECTORIS: ICD-10-CM

## 2024-02-08 DIAGNOSIS — I44.2 ATRIOVENTRICULAR BLOCK, COMPLETE: ICD-10-CM

## 2024-02-09 ENCOUNTER — APPOINTMENT (OUTPATIENT)
Age: 79
End: 2024-02-09

## 2024-02-12 ENCOUNTER — APPOINTMENT (OUTPATIENT)
Age: 79
End: 2024-02-12

## 2024-02-14 ENCOUNTER — OUTPATIENT (OUTPATIENT)
Dept: OUTPATIENT SERVICES | Facility: HOSPITAL | Age: 79
LOS: 1 days | End: 2024-02-14

## 2024-02-14 ENCOUNTER — APPOINTMENT (OUTPATIENT)
Age: 79
End: 2024-02-14

## 2024-02-14 DIAGNOSIS — I44.2 ATRIOVENTRICULAR BLOCK, COMPLETE: ICD-10-CM

## 2024-02-14 DIAGNOSIS — I25.10 ATHEROSCLEROTIC HEART DISEASE OF NATIVE CORONARY ARTERY WITHOUT ANGINA PECTORIS: ICD-10-CM

## 2024-02-14 DIAGNOSIS — Z98.890 OTHER SPECIFIED POSTPROCEDURAL STATES: Chronic | ICD-10-CM

## 2024-02-16 ENCOUNTER — APPOINTMENT (OUTPATIENT)
Dept: ELECTROPHYSIOLOGY | Facility: CLINIC | Age: 79
End: 2024-02-16
Payer: MEDICARE

## 2024-02-16 ENCOUNTER — APPOINTMENT (OUTPATIENT)
Age: 79
End: 2024-02-16

## 2024-02-16 VITALS
SYSTOLIC BLOOD PRESSURE: 100 MMHG | HEART RATE: 96 BPM | BODY MASS INDEX: 27.14 KG/M2 | HEIGHT: 64 IN | TEMPERATURE: 98 F | DIASTOLIC BLOOD PRESSURE: 65 MMHG | WEIGHT: 159 LBS

## 2024-02-16 DIAGNOSIS — Z45.018 ENCOUNTER FOR ADJUSTMENT AND MANAGEMENT OF OTHER PART OF CARDIAC PACEMAKER: ICD-10-CM

## 2024-02-16 DIAGNOSIS — I49.3 VENTRICULAR PREMATURE DEPOLARIZATION: ICD-10-CM

## 2024-02-16 PROCEDURE — 99214 OFFICE O/P EST MOD 30 MIN: CPT | Mod: 25

## 2024-02-16 PROCEDURE — 93280 PM DEVICE PROGR EVAL DUAL: CPT

## 2024-02-16 NOTE — PROCEDURE
[NSR] : normal sinus rhythm [Pacemaker] : pacemaker [DDD] : DDD [Longevity: ___ months] : The estimated remaining battery life is [unfilled] months [Lead Imp:  ___ohms] : lead impedance was [unfilled] ohms [___V @] : [unfilled] V [___ ms] : [unfilled] ms [See Device Printout] : See device printout [Sensing Amplitude ___mv] : sensing amplitude was [unfilled] mv [de-identified] : [facility]  [de-identified] : [id number] [de-identified] : [id number] [de-identified] : 05/17/2023 [de-identified] : 60/130 [de-identified] : 6 years to CASE  [de-identified] : A/V Paced 46/95% No Episodes AT AF 0%

## 2024-02-16 NOTE — PHYSICAL EXAM
[General Appearance - Well Developed] : well developed [Normal Appearance] : normal appearance [Well Groomed] : well groomed [General Appearance - Well Nourished] : well nourished [No Deformities] : no deformities [General Appearance - In No Acute Distress] : no acute distress [Heart Rate And Rhythm] : heart rate and rhythm were normal [] : no respiratory distress [Respiration, Rhythm And Depth] : normal respiratory rhythm and effort [Exaggerated Use Of Accessory Muscles For Inspiration] : no accessory muscle use [Left Infraclavicular] : left infraclavicular area [Clean] : clean [Dry] : dry [Well-Healed] : well-healed [FreeTextEntry2] : Site of ILR explant - Incision site healing well, clean, dry, no drainage, no redness, no bruising.

## 2024-02-16 NOTE — HISTORY OF PRESENT ILLNESS
[FreeTextEntry1] : He had frequent PVC's up to 7% burden. He did have 1 4 beat run of VT. He was on Coreg in the past which had been stopped for IVCD with prolonged first degree AV block, now s/p DC-PPM implanted on 05/17/2023 and pt was restarted on Coreg. Coreg stopped by cards and placed on metoprolol succ 25mg QD. The patient has CAD. The patient had an MI in 1998 and had POBA at the time. He has had an old IWMI. Characterization show no significant new CAD.  The patient had a cardiac cath performed. There was a 70 % lesion in the LAD which was not significant and severe proximal and distal LCX lesion . Had atherectomy and stent in the distal lesion and stent in the proximal lesion He has not had chest pain and feels well overall. He had seen by Dr. Mcelroy for his VT and frequent PVC's . The patient had a repeat MCOT monitor which showed 12% PVC's. The patient has 80% Proc RCA lesion which is heavily calcified. Nuclear stress showed only mild apical ischemia which is improved compared to prior nuclear stress test prior to his intervention.    Had sleep study November 2022 - was told it was okay.   Cardiologist: Dr. Garcia PCP: Dr. Canada

## 2024-02-16 NOTE — ASSESSMENT
[FreeTextEntry1] : Cardiac h/o CAD, MI, PCI with stent in 2021, frequent PVC, c/o palpitations. s/p ILR implant which revealed PVC burden 9%, pt unable to tolerated CCBs, now s/p DC-PPM. The pt presents today for routine device interrogation.  Today the patient is feeling generally well with no acute complaints. Denies CP, SOB, palpitations, dizziness, syncope. Pt states he goes back and forth from [place] to [place] but only 2-3 days at a time.   # PVCs, BB use, CHB s/p DC-PPM - PPM Incision site well healed with no s/s of infection. Patient has no pain. - ILR explant site well healed, clean, dry, no drainage, no redness, no bruising. - Device interrogated and reprogrammed as described in procedure. Device function normal. All parameters stable. No events. See Device Printout - Coreg 3.125mg BID was switched to Metoprolol succ 25mg QD  # Remote monitoring - patient is enrolled - Pt travels to and from [place], I explained pt could bring home remote monitor with him if he will be gone for more than 2wks.  I have also advised the patient to go to the nearest emergency room if he experiences any chest pain, dyspnea, syncope, or has any other compelling symptoms.   RTO in 7-9 mo/PRN

## 2024-02-16 NOTE — CARDIOLOGY SUMMARY
[de-identified] : 4/7/2023: sinus rhythm 74 bpm 1AVB, 3 PVCs, IVCD\par  4- NSR with first degree AV block IVCD rightward axis IVCD \par  4-192022 NSR IVCD PVC's \par  8/26/2022 NSR 77 bpm  1st degre AV block IVCD\par  10/26/2022 NSR with 1st degree AVB , LA 316ms left posterior Fascicular block, QRS 134ms occ PVC [de-identified] : 3-2-2021 PVC's NS VT \par  7-2021 PVC's No VT . 12% PVC buden [de-identified] : 3- Lexiscan stress test inferior ischemia . \par  9- Mild apical ischemia .  [de-identified] : 4- Normal LV systolic function mild MR and Mild TR . \par  5-4-2021 EF 56% TraceMR trace TR mild AI \par  8- Normal LV systolic function Trace MR mild TR mild enlargement of the sinus of Valsalva and ascending aorta  [de-identified] : 05/17/2023: DC-PPM ([place]) implanted\par  09/28/2022: ILR implanted, explanted 05/17/2023. [de-identified] : 4- LAD 70% stenosis not signficnt by FFR  70% lesion int eh prox LCX and 95% lesion in the distal LCX and 70-80% proc RCA \par  3.x12 Xience stent BECCA in prox LCX \par  3.5x 33 Xience BECCA in the distal lesion  [de-identified] : 10-7-2019 Mild Carotid disease bilaterally .

## 2024-02-21 ENCOUNTER — APPOINTMENT (OUTPATIENT)
Age: 79
End: 2024-02-21

## 2024-02-21 ENCOUNTER — OUTPATIENT (OUTPATIENT)
Dept: OUTPATIENT SERVICES | Facility: HOSPITAL | Age: 79
LOS: 1 days | End: 2024-02-21

## 2024-02-21 DIAGNOSIS — Z98.890 OTHER SPECIFIED POSTPROCEDURAL STATES: Chronic | ICD-10-CM

## 2024-02-21 DIAGNOSIS — I25.10 ATHEROSCLEROTIC HEART DISEASE OF NATIVE CORONARY ARTERY WITHOUT ANGINA PECTORIS: ICD-10-CM

## 2024-02-21 DIAGNOSIS — I44.2 ATRIOVENTRICULAR BLOCK, COMPLETE: ICD-10-CM

## 2024-02-23 ENCOUNTER — APPOINTMENT (OUTPATIENT)
Age: 79
End: 2024-02-23

## 2024-02-26 ENCOUNTER — APPOINTMENT (OUTPATIENT)
Age: 79
End: 2024-02-26

## 2024-02-26 ENCOUNTER — OUTPATIENT (OUTPATIENT)
Dept: OUTPATIENT SERVICES | Facility: HOSPITAL | Age: 79
LOS: 1 days | End: 2024-02-26

## 2024-02-26 DIAGNOSIS — I44.2 ATRIOVENTRICULAR BLOCK, COMPLETE: ICD-10-CM

## 2024-02-26 DIAGNOSIS — Z98.890 OTHER SPECIFIED POSTPROCEDURAL STATES: Chronic | ICD-10-CM

## 2024-02-26 DIAGNOSIS — I25.10 ATHEROSCLEROTIC HEART DISEASE OF NATIVE CORONARY ARTERY WITHOUT ANGINA PECTORIS: ICD-10-CM

## 2024-02-28 ENCOUNTER — APPOINTMENT (OUTPATIENT)
Age: 79
End: 2024-02-28

## 2024-03-01 ENCOUNTER — APPOINTMENT (OUTPATIENT)
Age: 79
End: 2024-03-01

## 2024-03-04 ENCOUNTER — OUTPATIENT (OUTPATIENT)
Dept: OUTPATIENT SERVICES | Facility: HOSPITAL | Age: 79
LOS: 1 days | End: 2024-03-04
Payer: MEDICARE

## 2024-03-04 ENCOUNTER — APPOINTMENT (OUTPATIENT)
Age: 79
End: 2024-03-04

## 2024-03-04 DIAGNOSIS — I44.2 ATRIOVENTRICULAR BLOCK, COMPLETE: ICD-10-CM

## 2024-03-04 DIAGNOSIS — I25.10 ATHEROSCLEROTIC HEART DISEASE OF NATIVE CORONARY ARTERY WITHOUT ANGINA PECTORIS: ICD-10-CM

## 2024-03-04 DIAGNOSIS — Z98.890 OTHER SPECIFIED POSTPROCEDURAL STATES: Chronic | ICD-10-CM

## 2024-03-04 PROCEDURE — 93798 PHYS/QHP OP CAR RHAB W/ECG: CPT

## 2024-03-05 DIAGNOSIS — I25.10 ATHEROSCLEROTIC HEART DISEASE OF NATIVE CORONARY ARTERY WITHOUT ANGINA PECTORIS: ICD-10-CM

## 2024-03-05 DIAGNOSIS — I44.2 ATRIOVENTRICULAR BLOCK, COMPLETE: ICD-10-CM

## 2024-03-06 ENCOUNTER — OUTPATIENT (OUTPATIENT)
Dept: OUTPATIENT SERVICES | Facility: HOSPITAL | Age: 79
LOS: 1 days | End: 2024-03-06

## 2024-03-06 ENCOUNTER — APPOINTMENT (OUTPATIENT)
Age: 79
End: 2024-03-06

## 2024-03-06 DIAGNOSIS — I25.10 ATHEROSCLEROTIC HEART DISEASE OF NATIVE CORONARY ARTERY WITHOUT ANGINA PECTORIS: ICD-10-CM

## 2024-03-06 DIAGNOSIS — I44.2 ATRIOVENTRICULAR BLOCK, COMPLETE: ICD-10-CM

## 2024-03-06 DIAGNOSIS — Z98.890 OTHER SPECIFIED POSTPROCEDURAL STATES: Chronic | ICD-10-CM

## 2024-03-08 ENCOUNTER — APPOINTMENT (OUTPATIENT)
Age: 79
End: 2024-03-08

## 2024-03-11 ENCOUNTER — OUTPATIENT (OUTPATIENT)
Dept: OUTPATIENT SERVICES | Facility: HOSPITAL | Age: 79
LOS: 1 days | End: 2024-03-11

## 2024-03-11 ENCOUNTER — APPOINTMENT (OUTPATIENT)
Age: 79
End: 2024-03-11

## 2024-03-11 DIAGNOSIS — I25.10 ATHEROSCLEROTIC HEART DISEASE OF NATIVE CORONARY ARTERY WITHOUT ANGINA PECTORIS: ICD-10-CM

## 2024-03-11 DIAGNOSIS — Z98.890 OTHER SPECIFIED POSTPROCEDURAL STATES: Chronic | ICD-10-CM

## 2024-03-11 DIAGNOSIS — I44.2 ATRIOVENTRICULAR BLOCK, COMPLETE: ICD-10-CM

## 2024-03-13 ENCOUNTER — APPOINTMENT (OUTPATIENT)
Age: 79
End: 2024-03-13

## 2024-03-13 ENCOUNTER — OUTPATIENT (OUTPATIENT)
Dept: OUTPATIENT SERVICES | Facility: HOSPITAL | Age: 79
LOS: 1 days | End: 2024-03-13

## 2024-03-13 DIAGNOSIS — Z98.890 OTHER SPECIFIED POSTPROCEDURAL STATES: Chronic | ICD-10-CM

## 2024-03-13 DIAGNOSIS — I44.2 ATRIOVENTRICULAR BLOCK, COMPLETE: ICD-10-CM

## 2024-03-13 DIAGNOSIS — I25.10 ATHEROSCLEROTIC HEART DISEASE OF NATIVE CORONARY ARTERY WITHOUT ANGINA PECTORIS: ICD-10-CM

## 2024-03-15 ENCOUNTER — APPOINTMENT (OUTPATIENT)
Age: 79
End: 2024-03-15

## 2024-03-18 ENCOUNTER — APPOINTMENT (OUTPATIENT)
Age: 79
End: 2024-03-18

## 2024-03-18 ENCOUNTER — OUTPATIENT (OUTPATIENT)
Dept: OUTPATIENT SERVICES | Facility: HOSPITAL | Age: 79
LOS: 1 days | End: 2024-03-18

## 2024-03-18 DIAGNOSIS — Z98.890 OTHER SPECIFIED POSTPROCEDURAL STATES: Chronic | ICD-10-CM

## 2024-03-18 DIAGNOSIS — I44.2 ATRIOVENTRICULAR BLOCK, COMPLETE: ICD-10-CM

## 2024-03-18 DIAGNOSIS — I25.10 ATHEROSCLEROTIC HEART DISEASE OF NATIVE CORONARY ARTERY WITHOUT ANGINA PECTORIS: ICD-10-CM

## 2024-03-20 ENCOUNTER — APPOINTMENT (OUTPATIENT)
Age: 79
End: 2024-03-20

## 2024-03-20 ENCOUNTER — OUTPATIENT (OUTPATIENT)
Dept: OUTPATIENT SERVICES | Facility: HOSPITAL | Age: 79
LOS: 1 days | End: 2024-03-20

## 2024-03-20 DIAGNOSIS — I25.10 ATHEROSCLEROTIC HEART DISEASE OF NATIVE CORONARY ARTERY WITHOUT ANGINA PECTORIS: ICD-10-CM

## 2024-03-20 DIAGNOSIS — I44.2 ATRIOVENTRICULAR BLOCK, COMPLETE: ICD-10-CM

## 2024-03-20 DIAGNOSIS — Z98.890 OTHER SPECIFIED POSTPROCEDURAL STATES: Chronic | ICD-10-CM

## 2024-03-22 ENCOUNTER — APPOINTMENT (OUTPATIENT)
Age: 79
End: 2024-03-22

## 2024-03-25 ENCOUNTER — OUTPATIENT (OUTPATIENT)
Dept: OUTPATIENT SERVICES | Facility: HOSPITAL | Age: 79
LOS: 1 days | End: 2024-03-25

## 2024-03-25 ENCOUNTER — APPOINTMENT (OUTPATIENT)
Age: 79
End: 2024-03-25

## 2024-03-25 DIAGNOSIS — I44.2 ATRIOVENTRICULAR BLOCK, COMPLETE: ICD-10-CM

## 2024-03-25 DIAGNOSIS — I25.10 ATHEROSCLEROTIC HEART DISEASE OF NATIVE CORONARY ARTERY WITHOUT ANGINA PECTORIS: ICD-10-CM

## 2024-03-25 DIAGNOSIS — Z98.890 OTHER SPECIFIED POSTPROCEDURAL STATES: Chronic | ICD-10-CM

## 2024-03-27 ENCOUNTER — OUTPATIENT (OUTPATIENT)
Dept: OUTPATIENT SERVICES | Facility: HOSPITAL | Age: 79
LOS: 1 days | End: 2024-03-27

## 2024-03-27 ENCOUNTER — APPOINTMENT (OUTPATIENT)
Age: 79
End: 2024-03-27

## 2024-03-27 DIAGNOSIS — I25.10 ATHEROSCLEROTIC HEART DISEASE OF NATIVE CORONARY ARTERY WITHOUT ANGINA PECTORIS: ICD-10-CM

## 2024-03-27 DIAGNOSIS — Z98.890 OTHER SPECIFIED POSTPROCEDURAL STATES: Chronic | ICD-10-CM

## 2024-03-27 DIAGNOSIS — I44.2 ATRIOVENTRICULAR BLOCK, COMPLETE: ICD-10-CM

## 2024-03-29 ENCOUNTER — APPOINTMENT (OUTPATIENT)
Age: 79
End: 2024-03-29

## 2024-04-01 ENCOUNTER — APPOINTMENT (OUTPATIENT)
Dept: OTOLARYNGOLOGY | Facility: CLINIC | Age: 79
End: 2024-04-01
Payer: MEDICARE

## 2024-04-01 DIAGNOSIS — J31.0 CHRONIC RHINITIS: ICD-10-CM

## 2024-04-01 DIAGNOSIS — J34.89 OTHER SPECIFIED DISORDERS OF NOSE AND NASAL SINUSES: ICD-10-CM

## 2024-04-01 DIAGNOSIS — J34.3 HYPERTROPHY OF NASAL TURBINATES: ICD-10-CM

## 2024-04-01 DIAGNOSIS — H81.10 BENIGN PAROXYSMAL VERTIGO, UNSPECIFIED EAR: ICD-10-CM

## 2024-04-01 DIAGNOSIS — R49.0 DYSPHONIA: ICD-10-CM

## 2024-04-01 PROCEDURE — 31231 NASAL ENDOSCOPY DX: CPT

## 2024-04-01 PROCEDURE — 99214 OFFICE O/P EST MOD 30 MIN: CPT | Mod: 25

## 2024-04-01 RX ORDER — IPRATROPIUM BROMIDE 42 UG/1
0.06 SPRAY NASAL 3 TIMES DAILY
Qty: 1 | Refills: 6 | Status: ACTIVE | COMMUNITY
Start: 2024-04-01 | End: 1900-01-01

## 2024-04-01 RX ORDER — AZELASTINE HYDROCHLORIDE 137 UG/1
0.1 SPRAY, METERED NASAL DAILY
Qty: 1 | Refills: 6 | Status: ACTIVE | COMMUNITY
Start: 2024-04-01 | End: 1900-01-01

## 2024-04-01 NOTE — PROCEDURE
[Flexible Endoscope] : examined with the flexible endoscope [S-Shaped Deviated] : S-shape deviation [Normal] : the paranasal sinuses had no abnormalities [FreeTextEntry6] : The following anatomic sites were directly examined in a sequential fashion: The scope was introduced in the nasal passage between the middle and inferior turbinates to exam the inferior portion of the middle meatus and the fontanelle, as well as the maxillary ostia. Next, the scope was passed medically and posteriorly to the middle turbinates to examine the sphenoethmoid recess and the superior turbinate region.

## 2024-04-01 NOTE — ASSESSMENT
[FreeTextEntry1] : Risks, benefits, and alternatives of nerve ablation turbinate reduction were explained including but not limited to bleeding, infection, persistent symptoms, numbness, perforation, change in smell, change in taste, need for additional surgery, etc...

## 2024-04-01 NOTE — HISTORY OF PRESENT ILLNESS
[FreeTextEntry1] : Patient presents today for c/o throat discomfort and a runny nose. He states when eats his nose runs and he feels as if he has a constant drip. The mucus is clear and thin. Would like to discuss possible Clarifix. He also feels as if his voice is changing and feels strained. C/o of episode of buzzing sound in L ear that caused dizziness that lasted few seconds. Reports that this has occurred in the past.  No further complaints

## 2024-04-03 ENCOUNTER — OUTPATIENT (OUTPATIENT)
Dept: OUTPATIENT SERVICES | Facility: HOSPITAL | Age: 79
LOS: 1 days | End: 2024-04-03
Payer: MEDICARE

## 2024-04-03 ENCOUNTER — APPOINTMENT (OUTPATIENT)
Age: 79
End: 2024-04-03

## 2024-04-03 DIAGNOSIS — Z98.890 OTHER SPECIFIED POSTPROCEDURAL STATES: Chronic | ICD-10-CM

## 2024-04-03 DIAGNOSIS — I25.10 ATHEROSCLEROTIC HEART DISEASE OF NATIVE CORONARY ARTERY WITHOUT ANGINA PECTORIS: ICD-10-CM

## 2024-04-03 PROCEDURE — 93798 PHYS/QHP OP CAR RHAB W/ECG: CPT

## 2024-04-04 DIAGNOSIS — I25.10 ATHEROSCLEROTIC HEART DISEASE OF NATIVE CORONARY ARTERY WITHOUT ANGINA PECTORIS: ICD-10-CM

## 2024-04-05 ENCOUNTER — APPOINTMENT (OUTPATIENT)
Age: 79
End: 2024-04-05

## 2024-04-08 ENCOUNTER — OUTPATIENT (OUTPATIENT)
Dept: OUTPATIENT SERVICES | Facility: HOSPITAL | Age: 79
LOS: 1 days | End: 2024-04-08

## 2024-04-08 ENCOUNTER — APPOINTMENT (OUTPATIENT)
Age: 79
End: 2024-04-08

## 2024-04-08 DIAGNOSIS — I25.10 ATHEROSCLEROTIC HEART DISEASE OF NATIVE CORONARY ARTERY WITHOUT ANGINA PECTORIS: ICD-10-CM

## 2024-04-08 DIAGNOSIS — Z98.890 OTHER SPECIFIED POSTPROCEDURAL STATES: Chronic | ICD-10-CM

## 2024-04-10 ENCOUNTER — APPOINTMENT (OUTPATIENT)
Age: 79
End: 2024-04-10

## 2024-04-10 ENCOUNTER — OUTPATIENT (OUTPATIENT)
Dept: OUTPATIENT SERVICES | Facility: HOSPITAL | Age: 79
LOS: 1 days | End: 2024-04-10

## 2024-04-10 DIAGNOSIS — Z98.890 OTHER SPECIFIED POSTPROCEDURAL STATES: Chronic | ICD-10-CM

## 2024-04-10 DIAGNOSIS — I25.10 ATHEROSCLEROTIC HEART DISEASE OF NATIVE CORONARY ARTERY WITHOUT ANGINA PECTORIS: ICD-10-CM

## 2024-04-12 ENCOUNTER — APPOINTMENT (OUTPATIENT)
Age: 79
End: 2024-04-12

## 2024-04-15 ENCOUNTER — OUTPATIENT (OUTPATIENT)
Dept: OUTPATIENT SERVICES | Facility: HOSPITAL | Age: 79
LOS: 1 days | End: 2024-04-15

## 2024-04-15 ENCOUNTER — APPOINTMENT (OUTPATIENT)
Age: 79
End: 2024-04-15

## 2024-04-15 DIAGNOSIS — I25.10 ATHEROSCLEROTIC HEART DISEASE OF NATIVE CORONARY ARTERY WITHOUT ANGINA PECTORIS: ICD-10-CM

## 2024-04-15 DIAGNOSIS — Z98.890 OTHER SPECIFIED POSTPROCEDURAL STATES: Chronic | ICD-10-CM

## 2024-04-17 ENCOUNTER — APPOINTMENT (OUTPATIENT)
Age: 79
End: 2024-04-17

## 2024-04-19 ENCOUNTER — APPOINTMENT (OUTPATIENT)
Age: 79
End: 2024-04-19

## 2024-04-21 ENCOUNTER — NON-APPOINTMENT (OUTPATIENT)
Age: 79
End: 2024-04-21

## 2024-04-22 ENCOUNTER — APPOINTMENT (OUTPATIENT)
Dept: CARDIOLOGY | Facility: CLINIC | Age: 79
End: 2024-04-22
Payer: MEDICARE

## 2024-04-22 ENCOUNTER — APPOINTMENT (OUTPATIENT)
Age: 79
End: 2024-04-22

## 2024-04-22 ENCOUNTER — OUTPATIENT (OUTPATIENT)
Dept: OUTPATIENT SERVICES | Facility: HOSPITAL | Age: 79
LOS: 1 days | End: 2024-04-22

## 2024-04-22 DIAGNOSIS — Z98.890 OTHER SPECIFIED POSTPROCEDURAL STATES: Chronic | ICD-10-CM

## 2024-04-22 DIAGNOSIS — I25.10 ATHEROSCLEROTIC HEART DISEASE OF NATIVE CORONARY ARTERY WITHOUT ANGINA PECTORIS: ICD-10-CM

## 2024-04-22 PROCEDURE — 93294 REM INTERROG EVL PM/LDLS PM: CPT

## 2024-04-22 PROCEDURE — 93296 REM INTERROG EVL PM/IDS: CPT

## 2024-04-24 ENCOUNTER — APPOINTMENT (OUTPATIENT)
Age: 79
End: 2024-04-24

## 2024-04-24 ENCOUNTER — OUTPATIENT (OUTPATIENT)
Dept: OUTPATIENT SERVICES | Facility: HOSPITAL | Age: 79
LOS: 1 days | End: 2024-04-24

## 2024-04-24 DIAGNOSIS — Z98.890 OTHER SPECIFIED POSTPROCEDURAL STATES: Chronic | ICD-10-CM

## 2024-04-24 DIAGNOSIS — I25.10 ATHEROSCLEROTIC HEART DISEASE OF NATIVE CORONARY ARTERY WITHOUT ANGINA PECTORIS: ICD-10-CM

## 2024-04-26 ENCOUNTER — APPOINTMENT (OUTPATIENT)
Age: 79
End: 2024-04-26

## 2024-04-29 ENCOUNTER — APPOINTMENT (OUTPATIENT)
Dept: CARDIOLOGY | Facility: CLINIC | Age: 79
End: 2024-04-29
Payer: MEDICARE

## 2024-04-29 VITALS — HEART RATE: 60 BPM | SYSTOLIC BLOOD PRESSURE: 128 MMHG | DIASTOLIC BLOOD PRESSURE: 71 MMHG | HEIGHT: 64 IN

## 2024-04-29 VITALS — BODY MASS INDEX: 27.46 KG/M2 | WEIGHT: 160 LBS

## 2024-04-29 DIAGNOSIS — I44.0 ATRIOVENTRICULAR BLOCK, FIRST DEGREE: ICD-10-CM

## 2024-04-29 DIAGNOSIS — E11.9 TYPE 2 DIABETES MELLITUS W/OUT COMPLICATIONS: ICD-10-CM

## 2024-04-29 DIAGNOSIS — I44.2 ATRIOVENTRICULAR BLOCK, COMPLETE: ICD-10-CM

## 2024-04-29 DIAGNOSIS — R42 DIZZINESS AND GIDDINESS: ICD-10-CM

## 2024-04-29 DIAGNOSIS — I25.10 ATHEROSCLEROTIC HEART DISEASE OF NATIVE CORONARY ARTERY W/OUT ANGINA PECTORIS: ICD-10-CM

## 2024-04-29 PROCEDURE — 93000 ELECTROCARDIOGRAM COMPLETE: CPT

## 2024-04-29 PROCEDURE — 99214 OFFICE O/P EST MOD 30 MIN: CPT | Mod: 25

## 2024-04-29 RX ORDER — MULTIVIT-MIN/IRON/FOLIC ACID/K 18-600-40
CAPSULE ORAL
Refills: 0 | Status: ACTIVE | COMMUNITY

## 2024-04-29 RX ORDER — EMPAGLIFLOZIN 25 MG/1
25 TABLET, FILM COATED ORAL
Refills: 0 | Status: COMPLETED | COMMUNITY
End: 2024-04-29

## 2024-04-29 RX ORDER — CHOLECALCIFEROL (VITAMIN D3) 125 MCG
TABLET ORAL
Refills: 0 | Status: ACTIVE | COMMUNITY

## 2024-04-29 RX ORDER — TIRZEPATIDE 5 MG/.5ML
5 INJECTION, SOLUTION SUBCUTANEOUS
Refills: 0 | Status: ACTIVE | COMMUNITY

## 2024-04-29 NOTE — HISTORY OF PRESENT ILLNESS
[FreeTextEntry1] : The patient had a cardiac cath performed. There was a 70 % lesion in the LAD which was not significant and severe proximal and distal LCX lesion . Had atherectomy and stent in the distal lesion and stent in the proximal lesion He has not had chest pain and feels well overall. He had een seen by Dr. Mcelroy for his VT and frequent PVC's . . The patiient has 80% Prox RCA lesion which is heavily calcified . Nuclear stress showed  only mild apical ischemia which is improved compared to prior nuclear stress test prior to his intervention . The patient has had some dizziness when bending down and picking something up . The patient had another MCOT by Dr. Mcelroy and was told of having Mobitz I and he had suggest to reimplant his loop monitor . There was a 14% PVC burden but no VT . The patient had a PPM placed . He states that he feels better Since last visit he has not had chest pain or SOB  and he has been going to cardiac rehab . The patient is to go for Rhinaer procedure . This is a RF proceedure . He is utilizing his PPM on ECG . He would need EP clearance as well for this

## 2024-04-29 NOTE — CARDIOLOGY SUMMARY
[de-identified] : 4- NSR with firt degree AV block IVCD rightward axis IVCD  4-192022 NSR IVCD PVC's  1-4-2022 NSR first degree AV block IVCD rightward axis . PVC .  4- NSR first degree AV block IVCD Cannot R/O Old IWMI  4- Atrial and ventirualr pacing .  [de-identified] : 3-2-2021 PVC's NS VT \par  7-2021 PVC's No VT . 12% PVC buden [de-identified] : 3- Lexiscan stress test inferior ischemia .  9- Mild apical ischemia . 1-2023 Lexiscan stress test Fixed apical defect . No ischemai   [de-identified] : 4- Normal LV systolic function mild MR and Mild TR .  5-4-2021 EF 56% TraceMR trace TR mild AI  8- Normal LV systolic function Trace MR mild TR mild enlargement of the sinus of Valsalva and ascending aorta  1-2023 From [facility] EF 50-55% Mild TR  [de-identified] : 4- LAD 70% stenosis not signficnt by FFR  70% lesion int eh prox LCX and 95% lesion in the distal LCX and 70-80% proc RCA  3.x12 Xience stent BECCA in prox LCX  3.5x 33 Xience BECCA in the distal lesion  [de-identified] : 10-7-2019 Mild Carotid disease bilaterally .  [___] : [unfilled]

## 2024-04-29 NOTE — ASSESSMENT
[FreeTextEntry1] : The patient has 3 vessel CAD and has had intervention of the LCX . He has lesion in the LAD and RCA which are treated medically . LAD lesion was not sig by FFR and RCA lesion is heavily calcified. He is without symptoms however and there was no ischemia on nuclear stress test less than a year ago  .Ramipril was stopped by his nephrologist .  His creatine has increase and is 2.04 on recent blood work . He is going to cardiac rehab .  The patient is going for Rhinaer procedure . This uses RF energy . He dos have a PPM and is utilizing his pacemaker and would need separate EP clearance . From the cardiac view point he is stable for this with intermediate risk . Again he would need EP input because of his PPM

## 2024-04-30 ENCOUNTER — NON-APPOINTMENT (OUTPATIENT)
Age: 79
End: 2024-04-30

## 2024-05-01 ENCOUNTER — APPOINTMENT (OUTPATIENT)
Age: 79
End: 2024-05-01

## 2024-05-01 ENCOUNTER — OUTPATIENT (OUTPATIENT)
Dept: OUTPATIENT SERVICES | Facility: HOSPITAL | Age: 79
LOS: 1 days | End: 2024-05-01
Payer: MEDICARE

## 2024-05-01 DIAGNOSIS — I25.10 ATHEROSCLEROTIC HEART DISEASE OF NATIVE CORONARY ARTERY WITHOUT ANGINA PECTORIS: ICD-10-CM

## 2024-05-01 DIAGNOSIS — Z98.890 OTHER SPECIFIED POSTPROCEDURAL STATES: Chronic | ICD-10-CM

## 2024-05-01 DIAGNOSIS — I44.2 ATRIOVENTRICULAR BLOCK, COMPLETE: ICD-10-CM

## 2024-05-01 PROCEDURE — 93798 PHYS/QHP OP CAR RHAB W/ECG: CPT

## 2024-05-02 DIAGNOSIS — I25.10 ATHEROSCLEROTIC HEART DISEASE OF NATIVE CORONARY ARTERY WITHOUT ANGINA PECTORIS: ICD-10-CM

## 2024-05-02 DIAGNOSIS — I44.2 ATRIOVENTRICULAR BLOCK, COMPLETE: ICD-10-CM

## 2024-05-06 ENCOUNTER — APPOINTMENT (OUTPATIENT)
Age: 79
End: 2024-05-06

## 2024-05-08 ENCOUNTER — APPOINTMENT (OUTPATIENT)
Age: 79
End: 2024-05-08

## 2024-05-09 ENCOUNTER — APPOINTMENT (OUTPATIENT)
Dept: OTOLARYNGOLOGY | Facility: CLINIC | Age: 79
End: 2024-05-09

## 2024-05-13 ENCOUNTER — APPOINTMENT (OUTPATIENT)
Age: 79
End: 2024-05-13

## 2024-05-13 ENCOUNTER — OUTPATIENT (OUTPATIENT)
Dept: OUTPATIENT SERVICES | Facility: HOSPITAL | Age: 79
LOS: 1 days | End: 2024-05-13
Payer: MEDICARE

## 2024-05-13 DIAGNOSIS — Z98.890 OTHER SPECIFIED POSTPROCEDURAL STATES: Chronic | ICD-10-CM

## 2024-05-13 DIAGNOSIS — I44.2 ATRIOVENTRICULAR BLOCK, COMPLETE: ICD-10-CM

## 2024-05-13 DIAGNOSIS — I25.10 ATHEROSCLEROTIC HEART DISEASE OF NATIVE CORONARY ARTERY WITHOUT ANGINA PECTORIS: ICD-10-CM

## 2024-05-13 PROCEDURE — 93798 PHYS/QHP OP CAR RHAB W/ECG: CPT

## 2024-05-14 DIAGNOSIS — I44.2 ATRIOVENTRICULAR BLOCK, COMPLETE: ICD-10-CM

## 2024-05-14 DIAGNOSIS — I25.10 ATHEROSCLEROTIC HEART DISEASE OF NATIVE CORONARY ARTERY WITHOUT ANGINA PECTORIS: ICD-10-CM

## 2024-05-15 ENCOUNTER — APPOINTMENT (OUTPATIENT)
Age: 79
End: 2024-05-15

## 2024-05-15 ENCOUNTER — OUTPATIENT (OUTPATIENT)
Dept: OUTPATIENT SERVICES | Facility: HOSPITAL | Age: 79
LOS: 1 days | End: 2024-05-15

## 2024-05-15 DIAGNOSIS — I44.2 ATRIOVENTRICULAR BLOCK, COMPLETE: ICD-10-CM

## 2024-05-15 DIAGNOSIS — Z98.890 OTHER SPECIFIED POSTPROCEDURAL STATES: Chronic | ICD-10-CM

## 2024-05-15 DIAGNOSIS — I25.10 ATHEROSCLEROTIC HEART DISEASE OF NATIVE CORONARY ARTERY WITHOUT ANGINA PECTORIS: ICD-10-CM

## 2024-05-17 ENCOUNTER — APPOINTMENT (OUTPATIENT)
Age: 79
End: 2024-05-17

## 2024-05-20 ENCOUNTER — APPOINTMENT (OUTPATIENT)
Age: 79
End: 2024-05-20

## 2024-05-20 ENCOUNTER — OUTPATIENT (OUTPATIENT)
Dept: OUTPATIENT SERVICES | Facility: HOSPITAL | Age: 79
LOS: 1 days | End: 2024-05-20

## 2024-05-20 DIAGNOSIS — I25.10 ATHEROSCLEROTIC HEART DISEASE OF NATIVE CORONARY ARTERY WITHOUT ANGINA PECTORIS: ICD-10-CM

## 2024-05-20 DIAGNOSIS — Z98.890 OTHER SPECIFIED POSTPROCEDURAL STATES: Chronic | ICD-10-CM

## 2024-05-20 DIAGNOSIS — I44.2 ATRIOVENTRICULAR BLOCK, COMPLETE: ICD-10-CM

## 2024-05-22 ENCOUNTER — APPOINTMENT (OUTPATIENT)
Age: 79
End: 2024-05-22

## 2024-05-24 ENCOUNTER — APPOINTMENT (OUTPATIENT)
Age: 79
End: 2024-05-24

## 2024-05-29 ENCOUNTER — APPOINTMENT (OUTPATIENT)
Age: 79
End: 2024-05-29

## 2024-06-03 ENCOUNTER — OUTPATIENT (OUTPATIENT)
Dept: OUTPATIENT SERVICES | Facility: HOSPITAL | Age: 79
LOS: 1 days | End: 2024-06-03
Payer: MEDICARE

## 2024-06-03 ENCOUNTER — APPOINTMENT (OUTPATIENT)
Age: 79
End: 2024-06-03

## 2024-06-03 DIAGNOSIS — Z98.890 OTHER SPECIFIED POSTPROCEDURAL STATES: Chronic | ICD-10-CM

## 2024-06-03 DIAGNOSIS — I44.2 ATRIOVENTRICULAR BLOCK, COMPLETE: ICD-10-CM

## 2024-06-03 DIAGNOSIS — I25.10 ATHEROSCLEROTIC HEART DISEASE OF NATIVE CORONARY ARTERY WITHOUT ANGINA PECTORIS: ICD-10-CM

## 2024-06-03 PROCEDURE — 93798 PHYS/QHP OP CAR RHAB W/ECG: CPT

## 2024-06-04 DIAGNOSIS — I25.10 ATHEROSCLEROTIC HEART DISEASE OF NATIVE CORONARY ARTERY WITHOUT ANGINA PECTORIS: ICD-10-CM

## 2024-06-04 DIAGNOSIS — I44.2 ATRIOVENTRICULAR BLOCK, COMPLETE: ICD-10-CM

## 2024-06-05 ENCOUNTER — APPOINTMENT (OUTPATIENT)
Age: 79
End: 2024-06-05

## 2024-06-10 ENCOUNTER — OUTPATIENT (OUTPATIENT)
Dept: OUTPATIENT SERVICES | Facility: HOSPITAL | Age: 79
LOS: 1 days | End: 2024-06-10

## 2024-06-10 ENCOUNTER — APPOINTMENT (OUTPATIENT)
Age: 79
End: 2024-06-10

## 2024-06-10 DIAGNOSIS — I44.2 ATRIOVENTRICULAR BLOCK, COMPLETE: ICD-10-CM

## 2024-06-10 DIAGNOSIS — I25.10 ATHEROSCLEROTIC HEART DISEASE OF NATIVE CORONARY ARTERY WITHOUT ANGINA PECTORIS: ICD-10-CM

## 2024-06-10 DIAGNOSIS — Z98.890 OTHER SPECIFIED POSTPROCEDURAL STATES: Chronic | ICD-10-CM

## 2024-06-12 ENCOUNTER — APPOINTMENT (OUTPATIENT)
Age: 79
End: 2024-06-12

## 2024-06-14 ENCOUNTER — APPOINTMENT (OUTPATIENT)
Age: 79
End: 2024-06-14

## 2024-06-17 ENCOUNTER — APPOINTMENT (OUTPATIENT)
Age: 79
End: 2024-06-17

## 2024-06-19 ENCOUNTER — APPOINTMENT (OUTPATIENT)
Age: 79
End: 2024-06-19

## 2024-06-21 ENCOUNTER — APPOINTMENT (OUTPATIENT)
Age: 79
End: 2024-06-21

## 2024-07-15 ENCOUNTER — APPOINTMENT (OUTPATIENT)
Dept: CARDIOLOGY | Facility: CLINIC | Age: 79
End: 2024-07-15
Payer: MEDICARE

## 2024-07-15 DIAGNOSIS — I49.3 VENTRICULAR PREMATURE DEPOLARIZATION: ICD-10-CM

## 2024-07-15 DIAGNOSIS — I35.1 NONRHEUMATIC AORTIC (VALVE) INSUFFICIENCY: ICD-10-CM

## 2024-07-15 DIAGNOSIS — E78.00 PURE HYPERCHOLESTEROLEMIA, UNSPECIFIED: ICD-10-CM

## 2024-07-15 DIAGNOSIS — E11.9 TYPE 2 DIABETES MELLITUS W/OUT COMPLICATIONS: ICD-10-CM

## 2024-07-15 PROCEDURE — 93306 TTE W/DOPPLER COMPLETE: CPT

## 2024-07-21 ENCOUNTER — NON-APPOINTMENT (OUTPATIENT)
Age: 79
End: 2024-07-21

## 2024-07-22 ENCOUNTER — APPOINTMENT (OUTPATIENT)
Dept: CARDIOLOGY | Facility: CLINIC | Age: 79
End: 2024-07-22

## 2024-07-22 PROCEDURE — 93294 REM INTERROG EVL PM/LDLS PM: CPT

## 2024-07-22 PROCEDURE — 93296 REM INTERROG EVL PM/IDS: CPT

## 2024-08-26 ENCOUNTER — APPOINTMENT (OUTPATIENT)
Dept: CARDIOLOGY | Facility: CLINIC | Age: 79
End: 2024-08-26
Payer: MEDICARE

## 2024-08-26 VITALS — HEIGHT: 64 IN | WEIGHT: 164 LBS | BODY MASS INDEX: 28 KG/M2

## 2024-08-26 VITALS — DIASTOLIC BLOOD PRESSURE: 70 MMHG | SYSTOLIC BLOOD PRESSURE: 118 MMHG | HEART RATE: 81 BPM

## 2024-08-26 DIAGNOSIS — I47.29 OTHER VENTRICULAR TACHYCARDIA: ICD-10-CM

## 2024-08-26 DIAGNOSIS — I25.10 ATHEROSCLEROTIC HEART DISEASE OF NATIVE CORONARY ARTERY W/OUT ANGINA PECTORIS: ICD-10-CM

## 2024-08-26 DIAGNOSIS — I10 ESSENTIAL (PRIMARY) HYPERTENSION: ICD-10-CM

## 2024-08-26 DIAGNOSIS — E78.00 PURE HYPERCHOLESTEROLEMIA, UNSPECIFIED: ICD-10-CM

## 2024-08-26 PROCEDURE — 93000 ELECTROCARDIOGRAM COMPLETE: CPT

## 2024-08-26 PROCEDURE — 99214 OFFICE O/P EST MOD 30 MIN: CPT | Mod: 25

## 2024-08-26 RX ORDER — TIRZEPATIDE 7.5 MG/.5ML
7.5 INJECTION, SOLUTION SUBCUTANEOUS
Refills: 0 | Status: ACTIVE | COMMUNITY

## 2024-08-26 NOTE — HISTORY OF PRESENT ILLNESS
[FreeTextEntry1] : The patient had a cardiac cath performed. There was a 70 % lesion in the LAD which was not significant and severe proximal and distal LCX lesion . Had atherectomy and stent in the distal lesion and stent in the proximal lesion He has not had chest pain and feels well overall. He had een seen by Dr. Mcelroy for his VT and frequent PVC's . . The patiient has 80% Prox RCA lesion which is heavily calcified . Nuclear stress showed  only mild apical ischemia which is improved compared to prior nuclear stress test prior to his intervention . The patient has had some dizziness when bending down and picking something up . The patient had another MCOT by Dr. Mcelroy and was told of having Mobitz I and he had suggest to reimplant his loop monitor . There was a 14% PVC burden but no VT . The patient had a PPM placed . He states that he feels better Since last visit he has not had chest pain or SOB  and he has been going to cardiac rehab . . He is utilizing his PPM on ECG .

## 2024-08-26 NOTE — ASSESSMENT
[FreeTextEntry1] : The patient has 3 vessel CAD and has had intervention of the LCX . He has lesion in the LAD and RCA which are treated medically . LAD lesion was not sig by FFR and RCA lesion is heavily calcified. He has had increased SOB compared to before. He is on Mounjaro but has not lost weight on her current dose.

## 2024-08-26 NOTE — CARDIOLOGY SUMMARY
[___] : [unfilled] [de-identified] : 4- NSR with firt degree AV block IVCD rightward axis IVCD  4-192022 NSR IVCD PVC's  1-4-2022 NSR first degree AV block IVCD rightward axis . PVC .  4- NSR first degree AV block IVCD Cannot R/O Old IWMI  4- Atrial and ventirualr pacing .  8- 1005 ventricular paced  [de-identified] : 3-2-2021 PVC's NS VT \par  7-2021 PVC's No VT . 12% PVC buden [de-identified] : 3- Lexiscan stress test inferior ischemia .  9- Mild apical ischemia . 1-2023 Lexiscan stress test Fixed apical defect . No ischemia [de-identified] : 4- Normal LV systolic function mild MR and Mild TR .  5-4-2021 EF 56% TraceMR trace TR mild AI  8- Normal LV systolic function Trace MR mild TR mild enlargement of the sinus of Valsalva and ascending aorta  1-2023 From [facility] EF 50-55% Mild TR  7-2024 EF 54% Trace MR trace TR Ascending aorta 3.8 cm  [de-identified] : 4- LAD 70% stenosis not signficnt by FFR  70% lesion int eh prox LCX and 95% lesion in the distal LCX and 70-80% proc RCA  3.x12 Xience stent BECCA in prox LCX  3.5x 33 Xience BECCA in the distal lesion  [de-identified] : 10-7-2019 Mild Carotid disease bilaterally .

## 2024-09-23 ENCOUNTER — RX RENEWAL (OUTPATIENT)
Age: 79
End: 2024-09-23

## 2024-10-02 ENCOUNTER — APPOINTMENT (OUTPATIENT)
Dept: CV DIAGNOSTICS | Facility: HOSPITAL | Age: 79
End: 2024-10-02
Payer: MEDICARE

## 2024-10-02 ENCOUNTER — OUTPATIENT (OUTPATIENT)
Dept: OUTPATIENT SERVICES | Facility: HOSPITAL | Age: 79
LOS: 1 days | End: 2024-10-02
Payer: MEDICARE

## 2024-10-02 ENCOUNTER — RESULT REVIEW (OUTPATIENT)
Age: 79
End: 2024-10-02

## 2024-10-02 DIAGNOSIS — Z98.890 OTHER SPECIFIED POSTPROCEDURAL STATES: Chronic | ICD-10-CM

## 2024-10-02 DIAGNOSIS — I47.29 OTHER VENTRICULAR TACHYCARDIA: ICD-10-CM

## 2024-10-02 PROCEDURE — 93017 CV STRESS TEST TRACING ONLY: CPT

## 2024-10-02 PROCEDURE — 93018 CV STRESS TEST I&R ONLY: CPT

## 2024-10-02 PROCEDURE — 78452 HT MUSCLE IMAGE SPECT MULT: CPT | Mod: MC

## 2024-10-02 PROCEDURE — 78452 HT MUSCLE IMAGE SPECT MULT: CPT | Mod: 26,MC

## 2024-10-02 PROCEDURE — A9500: CPT

## 2024-10-02 PROCEDURE — 93016 CV STRESS TEST SUPVJ ONLY: CPT

## 2024-10-02 RX ORDER — REGADENOSON 0.08 MG/ML
0.4 INJECTION, SOLUTION INTRAVENOUS ONCE
Refills: 0 | Status: DISCONTINUED | OUTPATIENT
Start: 2024-10-02 | End: 2024-10-16

## 2024-10-03 DIAGNOSIS — I47.29 OTHER VENTRICULAR TACHYCARDIA: ICD-10-CM

## 2024-10-09 ENCOUNTER — NON-APPOINTMENT (OUTPATIENT)
Age: 79
End: 2024-10-09

## 2024-10-10 ENCOUNTER — APPOINTMENT (OUTPATIENT)
Dept: CARDIOLOGY | Facility: CLINIC | Age: 79
End: 2024-10-10
Payer: MEDICARE

## 2024-10-10 VITALS
HEART RATE: 65 BPM | WEIGHT: 167 LBS | HEIGHT: 64 IN | BODY MASS INDEX: 28.51 KG/M2 | SYSTOLIC BLOOD PRESSURE: 118 MMHG | DIASTOLIC BLOOD PRESSURE: 60 MMHG

## 2024-10-10 DIAGNOSIS — I35.1 NONRHEUMATIC AORTIC (VALVE) INSUFFICIENCY: ICD-10-CM

## 2024-10-10 DIAGNOSIS — E11.9 TYPE 2 DIABETES MELLITUS W/OUT COMPLICATIONS: ICD-10-CM

## 2024-10-10 DIAGNOSIS — D64.9 ANEMIA, UNSPECIFIED: ICD-10-CM

## 2024-10-10 DIAGNOSIS — R94.39 ABNORMAL RESULT OF OTHER CARDIOVASCULAR FUNCTION STUDY: ICD-10-CM

## 2024-10-10 DIAGNOSIS — I25.10 ATHEROSCLEROTIC HEART DISEASE OF NATIVE CORONARY ARTERY W/OUT ANGINA PECTORIS: ICD-10-CM

## 2024-10-10 PROCEDURE — 93000 ELECTROCARDIOGRAM COMPLETE: CPT

## 2024-10-10 PROCEDURE — 99214 OFFICE O/P EST MOD 30 MIN: CPT | Mod: 25

## 2024-10-10 RX ORDER — ALBUTEROL SULFATE 90 UG/1
108 (90 BASE) AEROSOL, METERED RESPIRATORY (INHALATION)
Refills: 0 | Status: ACTIVE | COMMUNITY

## 2024-10-11 PROBLEM — R94.39 ABNORMAL NUCLEAR STRESS TEST: Status: ACTIVE | Noted: 2024-10-11

## 2024-10-18 VITALS — HEART RATE: 65 BPM | DIASTOLIC BLOOD PRESSURE: 63 MMHG | OXYGEN SATURATION: 98 % | SYSTOLIC BLOOD PRESSURE: 172 MMHG

## 2024-10-18 NOTE — H&P CARDIOLOGY - NSICDXPASTSURGICALHX_GEN_ALL_CORE_FT
PAST SURGICAL HISTORY:  H/O colonoscopy     History of percutaneous angioplasty     History of surgery on arm     Pacemaker

## 2024-10-18 NOTE — H&P CARDIOLOGY - HISTORY OF PRESENT ILLNESS
78 y/o M with Contrast Iodine allergy, with PMHx of HTN, HLD, DM-II, CAD with prior MI, PCI/BECCA p/d Cx on 4/15/21, CKD (baseline Cr 1.3-1.5), PPM in 05/2023 due to CHB and PVCs, who presented to his cardiologist with c/o increasing LONG...............................  Denies any chest pain, dizziness, n/v, diaphoresis, orthopnea, PND, LE edema, palpitations, syncope.  Pt underwent a NST on 10/2/24 which revealed small reversible defect in the apex consistent with ischemia superimposed on apical scar, EF 42%.  Pt is now referred for C with possible intervention if clinically indicated.     LHC 4/15/21:  Left main: mild disease  LAD: 70% diffuse lesion in mid LAD( iFR 0.92, not hemodynamically significant)  Left Circumflex: 70% diffuse lesion in prox segment, ectatic segment in distal Cx followed by a 95% lesion prior to OM2 origin  Right Coronary Artery: 80% lesion in prox segment,  moderate disease in RPDA and RPL    POST-OP DIAGNOSIS  - s/p Laser atherectomy and PCI with 1 BECCA ( 3.0x8mm) to distal Cx lesion , and PCI with BECCA in prox segment lesion  (3.5x33mm). iFR to mid LAD lesion is 0.92 ( not hemodynamically significant).      Pre cath note:    indication:  [ ] STEMI                [ ] NSTEMI                 [ ] Acute coronary syndrome                     [ ]Unstable Angina   [ ] high risk  [ ] intermediate risk  [ ] low risk                     [x ] Stable Angina     non-invasive testing:   NST             Date:    10/2/24         result: [ ] high risk  [x ] intermediate risk  [ ] low risk    Anti- Anginal medications:                    [ ] not used                       [x ] used                   [ ] not used but strong indication not to use  -on BB    Ejection Fraction                   [ ] <29            [ ] 30-39%   [x ] 40-49%     [ ]>50%    CHF                   [ ] active (within last 14 days on meds   [ ] Chronic (on meds but no exacerbation)    COPD                   [ ] mild (on chronic bronchodilators)  [ ] moderate (on chronic steroid therapy)      [ ] severe (indication for home O2 or PACO2 >50)    Other risk factors:                       [x ] Previous MI                     [ ] CVA/ stroke                    [ ] carotid stent/ CEA                    [ ] PVD/PAD- (arterial aneurysm, non-palpable pulses, tortuous vessel with inability to insert catheter, infra-renal dissection, renal or subclavian artery stenosis)                    [ x] diabetic                    [ ] previous CABG                    [x ] Renal Failure       Right Tavo Test:    Adjusted Cath Bleeding Risk:     Pre-hydration:   78 y/o M with Contrast Iodine allergy, with PMHx of HTN, HLD, DM-II, CAD with prior MI, PCI/BECCA p/d Cx on 4/15/21, CKD (baseline Cr 1.3-1.5), PPM in 05/2023 due to CHB and PVCs, who presented to his cardiologist with c/o increasing LONG. Denies any chest pain, dizziness, n/v, diaphoresis, orthopnea, PND, LE edema, palpitations, syncope.  Pt underwent a NST on 10/2/24 which revealed small reversible defect in the apex consistent with ischemia superimposed on apical scar, EF 42%.  Pt is now referred for C with possible intervention if clinically indicated.     LHC 4/15/21:  Left main: mild disease  LAD: 70% diffuse lesion in mid LAD( iFR 0.92, not hemodynamically significant)  Left Circumflex: 70% diffuse lesion in prox segment, ectatic segment in distal Cx followed by a 95% lesion prior to OM2 origin  Right Coronary Artery: 80% lesion in prox segment,  moderate disease in RPDA and RPL    POST-OP DIAGNOSIS  - s/p Laser atherectomy and PCI with 1 BECCA ( 3.0x8mm) to distal Cx lesion , and PCI with BECCA in prox segment lesion  (3.5x33mm). iFR to mid LAD lesion is 0.92 ( not hemodynamically significant).      Pre cath note:    indication:  [ ] STEMI                [ ] NSTEMI                 [ ] Acute coronary syndrome                     [ ]Unstable Angina   [ ] high risk  [ ] intermediate risk  [ ] low risk                     [x ] Stable Angina     non-invasive testing:   NST             Date:    10/2/24         result: [ ] high risk  [x ] intermediate risk  [ ] low risk    Anti- Anginal medications:                    [ ] not used                       [x ] used                   [ ] not used but strong indication not to use  -on BB    Ejection Fraction                   [ ] <29            [ ] 30-39%   [x ] 40-49%     [ ]>50%    CHF                   [ ] active (within last 14 days on meds   [ ] Chronic (on meds but no exacerbation)    COPD                   [ ] mild (on chronic bronchodilators)  [ ] moderate (on chronic steroid therapy)      [ ] severe (indication for home O2 or PACO2 >50)    Other risk factors:                       [x ] Previous MI                     [ ] CVA/ stroke                    [ ] carotid stent/ CEA                    [ ] PVD/PAD- (arterial aneurysm, non-palpable pulses, tortuous vessel with inability to insert catheter, infra-renal dissection, renal or subclavian artery stenosis)                    [ x] diabetic                    [ ] previous CABG                    [x ] Renal Failure       Right Tavo Test: positive    Adjusted Cath Bleeding Risk:     Pre-hydration:  ml bolus x 1 hour then 50ml/hr x 2    Patient with reaction to contrast last cath, Solucortef 200mg IVP given precath then Benadryl 50mg IVP when in lab   78 y/o M with Contrast Iodine allergy, with PMHx of HTN, HLD, DM-II, CAD with prior MI, PCI/BECCA p/d Cx on 4/15/21, CKD (baseline Cr 1.3-1.5), PPM in 05/2023 due to CHB and PVCs, who presented to his cardiologist with c/o increasing LONG. Denies any chest pain, dizziness, n/v, diaphoresis, orthopnea, PND, LE edema, palpitations, syncope.  Pt underwent a NST on 10/2/24 which revealed small reversible defect in the apex consistent with ischemia superimposed on apical scar, EF 42%.  Pt is now referred for C with possible intervention if clinically indicated.     LHC 4/15/21:  Left main: mild disease  LAD: 70% diffuse lesion in mid LAD( iFR 0.92, not hemodynamically significant)  Left Circumflex: 70% diffuse lesion in prox segment, ectatic segment in distal Cx followed by a 95% lesion prior to OM2 origin  Right Coronary Artery: 80% lesion in prox segment,  moderate disease in RPDA and RPL    POST-OP DIAGNOSIS  - s/p Laser atherectomy and PCI with 1 BECCA ( 3.0x8mm) to distal Cx lesion , and PCI with BECCA in prox segment lesion  (3.5x33mm). iFR to mid LAD lesion is 0.92 ( not hemodynamically significant).      Pre cath note:    indication:  [ ] STEMI                [ ] NSTEMI                 [ ] Acute coronary syndrome                     [ ]Unstable Angina   [ ] high risk  [ ] intermediate risk  [ ] low risk                     [x ] Stable Angina     non-invasive testing:   NST             Date:    10/2/24         result: [ ] high risk  [x ] intermediate risk  [ ] low risk    Anti- Anginal medications:                    [ ] not used                       [x ] used                   [ ] not used but strong indication not to use  -on BB, CCB given precath    Ejection Fraction                   [ ] <29            [ ] 30-39%   [x ] 40-49%     [ ]>50%    CHF                   [ ] active (within last 14 days on meds   [ ] Chronic (on meds but no exacerbation)    COPD                   [ ] mild (on chronic bronchodilators)  [ ] moderate (on chronic steroid therapy)      [ ] severe (indication for home O2 or PACO2 >50)    Other risk factors:                       [x ] Previous MI                     [ ] CVA/ stroke                    [ ] carotid stent/ CEA                    [ ] PVD/PAD- (arterial aneurysm, non-palpable pulses, tortuous vessel with inability to insert catheter, infra-renal dissection, renal or subclavian artery stenosis)                    [ x] diabetic                    [ ] previous CABG                    [x ] Renal Failure       Right Tavo Test: positive    Adjusted Cath Bleeding Risk: 1.8%    Pre-hydration:  ml bolus x 1 hour then 50ml/hr x 2    Patient with reaction to contrast last cath, Solucortef 200mg IVP given precath then Benadryl 50mg IVP when in lab

## 2024-10-18 NOTE — H&P CARDIOLOGY - EXTREMITIES
[facility] [address], [place] [address]

Test Date:    2022               Test Time:    03:29:47

Pat Name:     BILL APARICIO             Department:   

Patient ID:   [id number]           Room:          

Gender:       F                        Technician:   ALYSA

:          1965               Requested By: HUMPHREY PÉREZ

Order Number: [id number]            Reading MD:   Melquiades Epps MD

                                 Measurements

Intervals                              Axis          

Rate:         54                       P:            38

WY:           180                      QRS:          -27

QRSD:         96                       T:            83

QT:           496                                    

QTc:          472                                    

                           Interpretive Statements

SINUS RHYTHM

Electronically Signed On 2022 9:23:28 CST by Melquiades Epps MD
No cyanosis, clubbing or edema

## 2024-10-18 NOTE — H&P CARDIOLOGY - NSICDXPASTMEDICALHX_GEN_ALL_CORE_FT
PAST MEDICAL HISTORY:  Anemia     Asthma     CAD (coronary artery disease)     DM (diabetes mellitus)     H/O myocardial ischemia     High cholesterol     HTN (hypertension)     Neuropathy     Spinal stenosis      Patient did home sleep study --results pending/No. RAYMOND screening performed.  STOP BANG Legend: 0-2 = LOW Risk; 3-4 = INTERMEDIATE Risk; 5-8 = HIGH Risk

## 2024-10-21 ENCOUNTER — TRANSCRIPTION ENCOUNTER (OUTPATIENT)
Age: 79
End: 2024-10-21

## 2024-10-21 ENCOUNTER — OUTPATIENT (OUTPATIENT)
Dept: OUTPATIENT SERVICES | Facility: HOSPITAL | Age: 79
LOS: 1 days | Discharge: HOME | End: 2024-10-21
Payer: MEDICARE

## 2024-10-21 VITALS
OXYGEN SATURATION: 100 % | HEART RATE: 80 BPM | RESPIRATION RATE: 17 BRPM | DIASTOLIC BLOOD PRESSURE: 71 MMHG | SYSTOLIC BLOOD PRESSURE: 156 MMHG

## 2024-10-21 DIAGNOSIS — I25.10 ATHEROSCLEROTIC HEART DISEASE OF NATIVE CORONARY ARTERY WITHOUT ANGINA PECTORIS: ICD-10-CM

## 2024-10-21 DIAGNOSIS — Z98.890 OTHER SPECIFIED POSTPROCEDURAL STATES: Chronic | ICD-10-CM

## 2024-10-21 DIAGNOSIS — Z95.0 PRESENCE OF CARDIAC PACEMAKER: Chronic | ICD-10-CM

## 2024-10-21 LAB
ANION GAP SERPL CALC-SCNC: 13 MMOL/L — SIGNIFICANT CHANGE UP (ref 7–14)
BUN SERPL-MCNC: 23 MG/DL — HIGH (ref 10–20)
CALCIUM SERPL-MCNC: 9.6 MG/DL — SIGNIFICANT CHANGE UP (ref 8.4–10.5)
CHLORIDE SERPL-SCNC: 100 MMOL/L — SIGNIFICANT CHANGE UP (ref 98–110)
CO2 SERPL-SCNC: 24 MMOL/L — SIGNIFICANT CHANGE UP (ref 17–32)
CREAT SERPL-MCNC: 1.5 MG/DL — SIGNIFICANT CHANGE UP (ref 0.7–1.5)
EGFR: 48 ML/MIN/1.73M2 — LOW
GLUCOSE BLDC GLUCOMTR-MCNC: 300 MG/DL — HIGH (ref 70–99)
GLUCOSE SERPL-MCNC: 311 MG/DL — HIGH (ref 70–99)
HCT VFR BLD CALC: 36.1 % — LOW (ref 42–52)
HGB BLD-MCNC: 11.6 G/DL — LOW (ref 14–18)
MCHC RBC-ENTMCNC: 28 PG — SIGNIFICANT CHANGE UP (ref 27–31)
MCHC RBC-ENTMCNC: 32.1 G/DL — SIGNIFICANT CHANGE UP (ref 32–37)
MCV RBC AUTO: 87.2 FL — SIGNIFICANT CHANGE UP (ref 80–94)
NRBC # BLD: 0 /100 WBCS — SIGNIFICANT CHANGE UP (ref 0–0)
PLATELET # BLD AUTO: 274 K/UL — SIGNIFICANT CHANGE UP (ref 130–400)
PMV BLD: 10 FL — SIGNIFICANT CHANGE UP (ref 7.4–10.4)
POTASSIUM SERPL-MCNC: 4.2 MMOL/L — SIGNIFICANT CHANGE UP (ref 3.5–5)
POTASSIUM SERPL-SCNC: 4.2 MMOL/L — SIGNIFICANT CHANGE UP (ref 3.5–5)
RBC # BLD: 4.14 M/UL — LOW (ref 4.7–6.1)
RBC # FLD: 16.5 % — HIGH (ref 11.5–14.5)
SODIUM SERPL-SCNC: 137 MMOL/L — SIGNIFICANT CHANGE UP (ref 135–146)
WBC # BLD: 6.4 K/UL — SIGNIFICANT CHANGE UP (ref 4.8–10.8)
WBC # FLD AUTO: 6.4 K/UL — SIGNIFICANT CHANGE UP (ref 4.8–10.8)

## 2024-10-21 PROCEDURE — C1887: CPT

## 2024-10-21 PROCEDURE — 93458 L HRT ARTERY/VENTRICLE ANGIO: CPT | Mod: 26,XU

## 2024-10-21 PROCEDURE — C1894: CPT

## 2024-10-21 PROCEDURE — C1769: CPT

## 2024-10-21 PROCEDURE — 85027 COMPLETE CBC AUTOMATED: CPT

## 2024-10-21 PROCEDURE — 82962 GLUCOSE BLOOD TEST: CPT

## 2024-10-21 PROCEDURE — 0523T NTRAPX C FFR W/3D FUNCJL MAP: CPT

## 2024-10-21 PROCEDURE — 36415 COLL VENOUS BLD VENIPUNCTURE: CPT

## 2024-10-21 PROCEDURE — 93458 L HRT ARTERY/VENTRICLE ANGIO: CPT

## 2024-10-21 PROCEDURE — 80048 BASIC METABOLIC PNL TOTAL CA: CPT

## 2024-10-21 RX ORDER — AMLODIPINE BESYLATE 5 MG
2.5 TABLET ORAL ONCE
Refills: 0 | Status: COMPLETED | OUTPATIENT
Start: 2024-10-21 | End: 2024-10-21

## 2024-10-21 RX ORDER — SODIUM CHLORIDE 0.9 % (FLUSH) 0.9 %
1000 SYRINGE (ML) INJECTION
Refills: 0 | Status: DISCONTINUED | OUTPATIENT
Start: 2024-10-21 | End: 2024-10-21

## 2024-10-21 RX ORDER — TIRZEPATIDE 12.5 MG/.5ML
0 INJECTION, SOLUTION SUBCUTANEOUS
Refills: 0 | DISCHARGE

## 2024-10-21 RX ORDER — HYDROCORTISONE 5 MG/1
200 TABLET ORAL ONCE
Refills: 0 | Status: COMPLETED | OUTPATIENT
Start: 2024-10-21 | End: 2024-10-21

## 2024-10-21 RX ORDER — GLIPIZIDE 5 MG/1
0 TABLET ORAL
Refills: 0 | DISCHARGE

## 2024-10-21 RX ORDER — SODIUM CHLORIDE 0.9 % (FLUSH) 0.9 %
250 SYRINGE (ML) INJECTION ONCE
Refills: 0 | Status: COMPLETED | OUTPATIENT
Start: 2024-10-21 | End: 2024-10-21

## 2024-10-21 RX ORDER — METOPROLOL TARTRATE 50 MG
0.5 TABLET ORAL
Refills: 0 | DISCHARGE

## 2024-10-21 RX ORDER — ATORVASTATIN CALCIUM 10 MG/1
1 TABLET, FILM COATED ORAL
Refills: 0 | DISCHARGE

## 2024-10-21 RX ADMIN — HYDROCORTISONE 200 MILLIGRAM(S): 5 TABLET ORAL at 10:08

## 2024-10-21 RX ADMIN — Medication 50 MILLILITER(S): at 10:40

## 2024-10-21 RX ADMIN — Medication 250 MILLILITER(S): at 10:40

## 2024-10-21 RX ADMIN — Medication 2.5 MILLIGRAM(S): at 11:06

## 2024-10-21 NOTE — ASU DISCHARGE PLAN (ADULT/PEDIATRIC) - FINANCIAL ASSISTANCE
[facility] provides services at a reduced cost to those who are determined to be eligible through [facility]’s financial assistance program. Information regarding [facility]’s financial assistance program can be found by going to [email or web address] or by calling [phone].

## 2024-10-21 NOTE — CHART NOTE - NSCHARTNOTEFT_GEN_A_CORE
PRE-OP DIAGNOSIS:    Stable Angiina    PROCEDURE:     [x] Coronary Angiogram     [x] LHC     [] LVG     [] RHC     [] Intervention (see below)         PHYSICIAN:  Dr Ojeda    ASSISTANT: Dr BREE Soni     PROCEDURE DESCRIPTION:     Consent:      [x] Patient     [] Family Member     []  Used        Anesthesia:     [] General     [x] Sedation     [x] Local        Access & Closure:     [x] 6 Fr Right Radial Artery (D stat closure)     [] Fr Femoral Artery     [] Fr Femoral Vein     [] Fr Brachial Vein       IV Contrast: 45 mL        Intervention: None      Implants: None       FINDINGS:     Coronary Dominance: Right      LM: Minor luminal irregularities    LAD: Mid LAD 60% stenosis hemodynamically not significant by Cathworks FFR value of 0.84  D1: Mild disease    CX: Mild disease, patent stents  OM1: Mild disease    RCA: Mild disease, calcified vessel.  RPDA: Moderate disease  RPL: Moderate disease.     LVEDP: 19 mmHg      ESTIMATED BLOOD LOSS: < 10 mL        CONDITION:     [x] Good     [] Fair     [] Critical        SPECIMEN REMOVED: N/A       POST-OP DIAGNOSIS:      [x] Non Obstructive Coronary Artery Disease. Patent stents. Mid LAD 60% stenosis  hemodynamically not significant by Cathworks FFR value of 0.84.          PLAN OF CARE:     [x] D/C Home Today     [x] Medications: Aspirin 81 mg qd, Brilinta 90mg BID, Lipitor 80mg qd, Toprol 25mg qd.    [x] IV Fluids: NS @ 150 cc/hr for 2 hours PRE-OP DIAGNOSIS:    Stable Angiina    PROCEDURE:     [x] Coronary Angiogram     [x] LHC     [] LVG     [] RHC     [] Intervention (see below)         PHYSICIAN:  Dr Ojeda    ASSISTANT: Dr BREE Soni     PROCEDURE DESCRIPTION:     Consent:      [x] Patient     [] Family Member     []  Used        Anesthesia:     [] General     [x] Sedation     [x] Local        Access & Closure:     [x] 6 Fr Right Radial Artery (D stat closure)     [] Fr Femoral Artery     [] Fr Femoral Vein     [] Fr Brachial Vein       IV Contrast: 45 mL        Intervention: None      Implants: None       FINDINGS:     Coronary Dominance: Right      LM: Minor luminal irregularities    LAD: Mid LAD 60% stenosis hemodynamically not significant by Cathworks FFR value of 0.84  D1: Mild disease    CX: Mild disease, patent stents  OM1: Mild disease    RCA: Mod disease, calcified vessel.  RPDA: Moderate disease  RPL: Moderate disease.     LVEDP: 19 mmHg      ESTIMATED BLOOD LOSS: < 10 mL        CONDITION:     [x] Good     [] Fair     [] Critical        SPECIMEN REMOVED: N/A       POST-OP DIAGNOSIS:      [x] Non Obstructive Coronary Artery Disease. Patent stents. Mid LAD 60% stenosis  hemodynamically not significant by Cathworks FFR value of 0.84.          PLAN OF CARE:     [x] D/C Home Today     [x] Medications: Aspirin 81 mg qd, Brilinta 90mg BID, Lipitor 80mg qd, Toprol 25mg qd.    [x] IV Fluids: NS @ 150 cc/hr for 2 hours

## 2024-10-21 NOTE — ASU DISCHARGE PLAN (ADULT/PEDIATRIC) - CARE PROVIDER_API CALL
Ifeanyi Garcia  Cardiovascular Disease  [address] [place] [address], [facility] [address]  [place] [address]  Phone: [phone]  Fax: [phone]  Established Patient  Follow Up Time: 2 weeks

## 2024-10-21 NOTE — ASU PATIENT PROFILE, ADULT - NSICDXPASTMEDICALHX_GEN_ALL_CORE_FT
PAST MEDICAL HISTORY:  Anemia     Asthma     CAD (coronary artery disease)     DM (diabetes mellitus)     H/O myocardial ischemia     High cholesterol     HTN (hypertension)     Neuropathy     Spinal stenosis     Stage 3 chronic kidney disease

## 2024-10-21 NOTE — ASU DISCHARGE PLAN (ADULT/PEDIATRIC) - ASU DC SPECIAL INSTRUCTIONSFT
Discharge Instructions as follows:  - Continue medical regimen as prescribed to prevent chest pain.  - If you are diabetic and taking medication containing Metformin, do not take them for 48 hours after the procedure  - Instructed to call 911 if chest pain, shortness of breath or bleeding from access site.  - No heavy lifting > 10lbs x 1 week.  - No driving x 24 hours.  - No baths, swimming pools x 1 week, may shower  - Low sodium low fat low cholesterol diet  - Follow-up with Cardiologist in 1-2 weeks after discharge  - Soreness or tenderness at the site is possible, it will diminish over time. You may take Tylenol every 4-6 hours as needed. Nothing stronger is needed. NO Motrin/Ibuprofen  - Any questions call cardiac cath lab [phone]

## 2024-10-22 ENCOUNTER — EMERGENCY (EMERGENCY)
Facility: HOSPITAL | Age: 79
LOS: 0 days | Discharge: HOME | End: 2024-10-22
Attending: EMERGENCY MEDICINE
Payer: MEDICARE

## 2024-10-22 ENCOUNTER — APPOINTMENT (OUTPATIENT)
Dept: CARDIOLOGY | Facility: CLINIC | Age: 79
End: 2024-10-22

## 2024-10-22 VITALS
TEMPERATURE: 98 F | HEART RATE: 76 BPM | RESPIRATION RATE: 18 BRPM | SYSTOLIC BLOOD PRESSURE: 156 MMHG | HEIGHT: 64 IN | WEIGHT: 164.91 LBS | OXYGEN SATURATION: 100 % | DIASTOLIC BLOOD PRESSURE: 72 MMHG

## 2024-10-22 DIAGNOSIS — Z98.890 OTHER SPECIFIED POSTPROCEDURAL STATES: Chronic | ICD-10-CM

## 2024-10-22 PROBLEM — N18.30 CHRONIC KIDNEY DISEASE, STAGE 3 UNSPECIFIED: Chronic | Status: ACTIVE | Noted: 2024-10-21

## 2024-10-22 PROCEDURE — 99284 EMERGENCY DEPT VISIT MOD MDM: CPT

## 2024-10-22 PROCEDURE — 99284 EMERGENCY DEPT VISIT MOD MDM: CPT | Mod: 25

## 2024-10-22 PROCEDURE — 90715 TDAP VACCINE 7 YRS/> IM: CPT

## 2024-10-22 PROCEDURE — 71046 X-RAY EXAM CHEST 2 VIEWS: CPT

## 2024-10-22 PROCEDURE — 93296 REM INTERROG EVL PM/IDS: CPT

## 2024-10-22 PROCEDURE — 73030 X-RAY EXAM OF SHOULDER: CPT | Mod: RT

## 2024-10-22 PROCEDURE — 73030 X-RAY EXAM OF SHOULDER: CPT | Mod: 26,RT

## 2024-10-22 PROCEDURE — 71046 X-RAY EXAM CHEST 2 VIEWS: CPT | Mod: 26

## 2024-10-22 PROCEDURE — 93294 REM INTERROG EVL PM/LDLS PM: CPT

## 2024-10-22 RX ORDER — TETANUS TOXOID, REDUCED DIPHTHERIA TOXOID AND ACELLULAR PERTUSSIS VACCINE, ADSORBED 5; 2.5; 8; 8; 2.5 [IU]/.5ML; [IU]/.5ML; UG/.5ML; UG/.5ML; UG/.5ML
0.5 SUSPENSION INTRAMUSCULAR ONCE
Refills: 0 | Status: COMPLETED | OUTPATIENT
Start: 2024-10-22 | End: 2024-10-22

## 2024-10-22 RX ORDER — ACETAMINOPHEN 325 MG
975 TABLET ORAL ONCE
Refills: 0 | Status: COMPLETED | OUTPATIENT
Start: 2024-10-22 | End: 2024-10-22

## 2024-10-22 RX ADMIN — Medication 975 MILLIGRAM(S): at 21:34

## 2024-10-22 RX ADMIN — TETANUS TOXOID, REDUCED DIPHTHERIA TOXOID AND ACELLULAR PERTUSSIS VACCINE, ADSORBED 0.5 MILLILITER(S): 5; 2.5; 8; 8; 2.5 SUSPENSION INTRAMUSCULAR at 21:36

## 2024-10-22 NOTE — ED PROVIDER NOTE - PATIENT PORTAL LINK FT
You can access the FollowMyHealth Patient Portal offered by [facility] by registering at the following website: [email or web address]. By joining [facility]’s FollowMyHealth portal, you will also be able to view your health information using other applications (apps) compatible with our system.

## 2024-10-22 NOTE — ED ADULT TRIAGE NOTE - ARRIVAL FROM
Home
Assistance with ambulation/Assistance OOB with selected safe patient handling equipment/Communicate Risk of Fall with Harm to all staff/Discuss with provider need for PT consult/Monitor gait and stability/Provide patient with walking aids - walker, cane, crutches/Reinforce activity limits and safety measures with patient and family/Tailored Fall Risk Interventions/Visual Cue: Yellow wristband and red socks/Bed in lowest position, wheels locked, appropriate side rails in place/Call bell, personal items and telephone in reach/Instruct patient to call for assistance before getting out of bed or chair/Non-slip footwear when patient is out of bed/[place] to call system/Physically safe environment - no spills, clutter or unnecessary equipment/Purposeful Proactive Rounding/Room/bathroom lighting operational, light cord in reach

## 2024-10-22 NOTE — ED PROVIDER NOTE - PHYSICAL EXAMINATION
VITAL SIGNS: I have reviewed nursing notes and confirm.  CONSTITUTIONAL: Well-developed; well-nourished; in moderate painful acute distress.  SKIN: Small abrasion to knee;  otherwise, skin exam is warm and dry, no acute rash.  HEAD: Normocephalic; atraumatic.  EYES: PERRL, EOM intact; conjunctiva and sclera clear.  ENT: No nasal discharge; airway clear. TMs clear.  NECK: Supple; non tender.  CARD: S1, S2 normal; no murmurs, gallops, or rubs. Regular rate and rhythm.  RESP: No wheezes, rales or rhonchi.  ABD: Normal bowel sounds; soft; non-distended; non-tender; no hepatosplenomegaly.  EXT: Right shoulder swelling with limitation in ROM. Normal pulses bilaterally; normal sensation;  LYMPH: No acute cervical adenopathy.  NEURO: Alert, oriented. Grossly unremarkable. No focal deficits.  PSYCH: Cooperative, appropriate.

## 2024-10-22 NOTE — ED PROVIDER NOTE - CLINICAL SUMMARY MEDICAL DECISION MAKING FREE TEXT BOX
Mechanical fall with majority of impact onto right shoulder.  Proximal humerus fracture noted.  Analgesia.  Sling applied.  Ambulating.  Stable for discharge and close outpatient follow up with orthopedics.

## 2024-10-22 NOTE — ED PROVIDER NOTE - CARE PROVIDER_API CALL
Vasu Coppola  Orthopaedic Surgery  [address] [place] [address]  Phone: [phone]  Fax: [phone]  Follow Up Time:

## 2024-10-22 NOTE — ED ADULT TRIAGE NOTE - CHIEF COMPLAINT QUOTE
Pt slipped while walking,  fell to R side, has difficulty lifting his R arm, and abrasion to R knee.

## 2024-10-22 NOTE — ED PROVIDER NOTE - OBJECTIVE STATEMENT
78 yo man with mechanical fall onto his right side.  No head trauma.  No neck pain.  NO LOC.  No chest or abdominal pains.  The brunt of the impact was onto his right shoulder which is now swollen and in pain.  He cannot move the right shoulder due to discomfort.  He also has a small abrasion to his knee.

## 2024-10-23 ENCOUNTER — APPOINTMENT (OUTPATIENT)
Dept: ORTHOPEDIC SURGERY | Facility: CLINIC | Age: 79
End: 2024-10-23
Payer: MEDICARE

## 2024-10-23 DIAGNOSIS — S80.01XA CONTUSION OF RIGHT KNEE, INITIAL ENCOUNTER: ICD-10-CM

## 2024-10-23 DIAGNOSIS — S42.201A UNSPECIFIED FRACTURE OF UPPER END OF RIGHT HUMERUS, INITIAL ENCOUNTER FOR CLOSED FRACTURE: ICD-10-CM

## 2024-10-23 PROCEDURE — 99203 OFFICE O/P NEW LOW 30 MIN: CPT | Mod: 25

## 2024-10-23 PROCEDURE — 73562 X-RAY EXAM OF KNEE 3: CPT | Mod: RT

## 2024-10-24 DIAGNOSIS — I25.118 ATHEROSCLEROTIC HEART DISEASE OF NATIVE CORONARY ARTERY WITH OTHER FORMS OF ANGINA PECTORIS: ICD-10-CM

## 2024-10-24 DIAGNOSIS — I10 ESSENTIAL (PRIMARY) HYPERTENSION: ICD-10-CM

## 2024-10-24 DIAGNOSIS — E78.5 HYPERLIPIDEMIA, UNSPECIFIED: ICD-10-CM

## 2024-10-24 DIAGNOSIS — Z95.5 PRESENCE OF CORONARY ANGIOPLASTY IMPLANT AND GRAFT: ICD-10-CM

## 2024-10-24 DIAGNOSIS — R94.39 ABNORMAL RESULT OF OTHER CARDIOVASCULAR FUNCTION STUDY: ICD-10-CM

## 2024-10-30 ENCOUNTER — APPOINTMENT (OUTPATIENT)
Dept: CARDIOLOGY | Facility: CLINIC | Age: 79
End: 2024-10-30
Payer: MEDICARE

## 2024-10-30 VITALS
HEIGHT: 64 IN | HEART RATE: 78 BPM | SYSTOLIC BLOOD PRESSURE: 118 MMHG | WEIGHT: 165 LBS | BODY MASS INDEX: 28.17 KG/M2 | DIASTOLIC BLOOD PRESSURE: 70 MMHG

## 2024-10-30 DIAGNOSIS — I44.0 ATRIOVENTRICULAR BLOCK, FIRST DEGREE: ICD-10-CM

## 2024-10-30 DIAGNOSIS — J31.0 CHRONIC RHINITIS: ICD-10-CM

## 2024-10-30 DIAGNOSIS — D64.9 ANEMIA, UNSPECIFIED: ICD-10-CM

## 2024-10-30 DIAGNOSIS — N40.0 BENIGN PROSTATIC HYPERPLASIA WITHOUT LOWER URINARY TRACT SYMPMS: ICD-10-CM

## 2024-10-30 PROCEDURE — 99214 OFFICE O/P EST MOD 30 MIN: CPT | Mod: 25

## 2024-10-30 PROCEDURE — 93000 ELECTROCARDIOGRAM COMPLETE: CPT

## 2024-10-31 RX ORDER — OXYCODONE AND ACETAMINOPHEN 5; 325 MG/1; MG/1
5-325 TABLET ORAL
Qty: 24 | Refills: 0 | Status: ACTIVE | COMMUNITY
Start: 2024-10-23 | End: 1900-01-01

## 2024-11-05 ENCOUNTER — APPOINTMENT (OUTPATIENT)
Dept: ORTHOPEDIC SURGERY | Facility: CLINIC | Age: 79
End: 2024-11-05
Payer: MEDICARE

## 2024-11-05 DIAGNOSIS — S42.201A UNSPECIFIED FRACTURE OF UPPER END OF RIGHT HUMERUS, INITIAL ENCOUNTER FOR CLOSED FRACTURE: ICD-10-CM

## 2024-11-05 PROCEDURE — 73060 X-RAY EXAM OF HUMERUS: CPT | Mod: RT

## 2024-11-05 PROCEDURE — 99203 OFFICE O/P NEW LOW 30 MIN: CPT | Mod: 25

## 2024-11-05 PROCEDURE — 23600 CLTX PROX HUMRL FX W/O MNPJ: CPT | Mod: RT

## 2024-11-15 ENCOUNTER — NON-APPOINTMENT (OUTPATIENT)
Age: 79
End: 2024-11-15

## 2024-11-15 ENCOUNTER — APPOINTMENT (OUTPATIENT)
Dept: ELECTROPHYSIOLOGY | Facility: CLINIC | Age: 79
End: 2024-11-15
Payer: MEDICARE

## 2024-11-15 VITALS
SYSTOLIC BLOOD PRESSURE: 146 MMHG | HEART RATE: 68 BPM | HEIGHT: 64 IN | TEMPERATURE: 97.1 F | BODY MASS INDEX: 27.83 KG/M2 | DIASTOLIC BLOOD PRESSURE: 80 MMHG | WEIGHT: 163 LBS

## 2024-11-15 DIAGNOSIS — I44.2 ATRIOVENTRICULAR BLOCK, COMPLETE: ICD-10-CM

## 2024-11-15 DIAGNOSIS — Z45.018 ENCOUNTER FOR ADJUSTMENT AND MANAGEMENT OF OTHER PART OF CARDIAC PACEMAKER: ICD-10-CM

## 2024-11-15 DIAGNOSIS — R42 DIZZINESS AND GIDDINESS: ICD-10-CM

## 2024-11-15 PROCEDURE — 93280 PM DEVICE PROGR EVAL DUAL: CPT

## 2024-11-15 PROCEDURE — 99214 OFFICE O/P EST MOD 30 MIN: CPT | Mod: 25

## 2024-12-03 ENCOUNTER — APPOINTMENT (OUTPATIENT)
Dept: ORTHOPEDIC SURGERY | Facility: CLINIC | Age: 79
End: 2024-12-03
Payer: MEDICARE

## 2024-12-03 DIAGNOSIS — S42.201A UNSPECIFIED FRACTURE OF UPPER END OF RIGHT HUMERUS, INITIAL ENCOUNTER FOR CLOSED FRACTURE: ICD-10-CM

## 2024-12-03 PROCEDURE — 99024 POSTOP FOLLOW-UP VISIT: CPT

## 2024-12-03 PROCEDURE — 73060 X-RAY EXAM OF HUMERUS: CPT | Mod: RT

## 2024-12-30 ENCOUNTER — APPOINTMENT (OUTPATIENT)
Dept: CARDIOLOGY | Facility: CLINIC | Age: 79
End: 2024-12-30
Payer: MEDICARE

## 2024-12-30 VITALS — DIASTOLIC BLOOD PRESSURE: 80 MMHG | SYSTOLIC BLOOD PRESSURE: 124 MMHG | HEART RATE: 84 BPM

## 2024-12-30 VITALS — WEIGHT: 162 LBS | BODY MASS INDEX: 27.66 KG/M2 | HEIGHT: 64 IN

## 2024-12-30 PROCEDURE — 99214 OFFICE O/P EST MOD 30 MIN: CPT | Mod: 25

## 2024-12-30 PROCEDURE — 93000 ELECTROCARDIOGRAM COMPLETE: CPT

## 2025-01-21 ENCOUNTER — APPOINTMENT (OUTPATIENT)
Dept: CARDIOLOGY | Facility: CLINIC | Age: 80
End: 2025-01-21

## 2025-01-21 ENCOUNTER — NON-APPOINTMENT (OUTPATIENT)
Age: 80
End: 2025-01-21

## 2025-01-21 ENCOUNTER — APPOINTMENT (OUTPATIENT)
Dept: ORTHOPEDIC SURGERY | Facility: CLINIC | Age: 80
End: 2025-01-21
Payer: MEDICARE

## 2025-01-21 DIAGNOSIS — S42.201A UNSPECIFIED FRACTURE OF UPPER END OF RIGHT HUMERUS, INITIAL ENCOUNTER FOR CLOSED FRACTURE: ICD-10-CM

## 2025-01-21 PROCEDURE — 99024 POSTOP FOLLOW-UP VISIT: CPT

## 2025-01-21 PROCEDURE — 93296 REM INTERROG EVL PM/IDS: CPT

## 2025-01-21 PROCEDURE — 73030 X-RAY EXAM OF SHOULDER: CPT | Mod: RT

## 2025-01-21 PROCEDURE — 93294 REM INTERROG EVL PM/LDLS PM: CPT

## 2025-02-05 ENCOUNTER — APPOINTMENT (OUTPATIENT)
Dept: OTOLARYNGOLOGY | Facility: CLINIC | Age: 80
End: 2025-02-05
Payer: MEDICARE

## 2025-02-05 DIAGNOSIS — L29.9 PRURITUS, UNSPECIFIED: ICD-10-CM

## 2025-02-05 DIAGNOSIS — H81.10 BENIGN PAROXYSMAL VERTIGO, UNSPECIFIED EAR: ICD-10-CM

## 2025-02-05 DIAGNOSIS — J30.0 VASOMOTOR RHINITIS: ICD-10-CM

## 2025-02-05 PROCEDURE — 99214 OFFICE O/P EST MOD 30 MIN: CPT | Mod: 25

## 2025-02-05 PROCEDURE — 31231 NASAL ENDOSCOPY DX: CPT

## 2025-02-05 RX ORDER — MOMETASONE FUROATE 1 MG/G
0.1 CREAM TOPICAL TWICE DAILY
Qty: 1 | Refills: 3 | Status: ACTIVE | COMMUNITY
Start: 2025-02-05 | End: 1900-01-01

## 2025-02-11 RX ORDER — FLUOCINOLONE ACETONIDE 0.11 MG/ML
0.01 OIL AURICULAR (OTIC)
Qty: 20 | Refills: 3 | Status: ACTIVE | COMMUNITY
Start: 2025-02-11 | End: 1900-01-01

## 2025-03-03 ENCOUNTER — APPOINTMENT (OUTPATIENT)
Dept: CARDIOLOGY | Facility: CLINIC | Age: 80
End: 2025-03-03

## 2025-03-08 ENCOUNTER — NON-APPOINTMENT (OUTPATIENT)
Age: 80
End: 2025-03-08

## 2025-04-04 ENCOUNTER — RX RENEWAL (OUTPATIENT)
Age: 80
End: 2025-04-04

## 2025-04-28 ENCOUNTER — NON-APPOINTMENT (OUTPATIENT)
Age: 80
End: 2025-04-28

## 2025-04-28 ENCOUNTER — APPOINTMENT (OUTPATIENT)
Dept: CARDIOLOGY | Facility: CLINIC | Age: 80
End: 2025-04-28
Payer: MEDICARE

## 2025-04-28 VITALS
HEART RATE: 77 BPM | WEIGHT: 159 LBS | HEIGHT: 64 IN | BODY MASS INDEX: 27.14 KG/M2 | DIASTOLIC BLOOD PRESSURE: 70 MMHG | SYSTOLIC BLOOD PRESSURE: 130 MMHG

## 2025-04-28 DIAGNOSIS — I35.1 NONRHEUMATIC AORTIC (VALVE) INSUFFICIENCY: ICD-10-CM

## 2025-04-28 DIAGNOSIS — E78.5 HYPERLIPIDEMIA, UNSPECIFIED: ICD-10-CM

## 2025-04-28 DIAGNOSIS — E11.9 TYPE 2 DIABETES MELLITUS W/OUT COMPLICATIONS: ICD-10-CM

## 2025-04-28 DIAGNOSIS — J30.0 VASOMOTOR RHINITIS: ICD-10-CM

## 2025-04-28 DIAGNOSIS — J31.0 CHRONIC RHINITIS: ICD-10-CM

## 2025-04-28 DIAGNOSIS — I77.9 DISORDER OF ARTERIES AND ARTERIOLES, UNSPECIFIED: ICD-10-CM

## 2025-04-28 PROCEDURE — 99214 OFFICE O/P EST MOD 30 MIN: CPT | Mod: 25

## 2025-04-28 PROCEDURE — 93000 ELECTROCARDIOGRAM COMPLETE: CPT

## 2025-04-29 ENCOUNTER — APPOINTMENT (OUTPATIENT)
Dept: ORTHOPEDIC SURGERY | Facility: CLINIC | Age: 80
End: 2025-04-29
Payer: MEDICARE

## 2025-04-29 DIAGNOSIS — S42.201A UNSPECIFIED FRACTURE OF UPPER END OF RIGHT HUMERUS, INITIAL ENCOUNTER FOR CLOSED FRACTURE: ICD-10-CM

## 2025-04-29 PROCEDURE — 99213 OFFICE O/P EST LOW 20 MIN: CPT

## 2025-04-29 PROCEDURE — 73030 X-RAY EXAM OF SHOULDER: CPT | Mod: RT

## 2025-05-08 ENCOUNTER — NON-APPOINTMENT (OUTPATIENT)
Age: 80
End: 2025-05-08

## 2025-05-08 ENCOUNTER — APPOINTMENT (OUTPATIENT)
Dept: ELECTROPHYSIOLOGY | Facility: CLINIC | Age: 80
End: 2025-05-08
Payer: MEDICARE

## 2025-05-08 VITALS
HEART RATE: 68 BPM | WEIGHT: 156 LBS | DIASTOLIC BLOOD PRESSURE: 70 MMHG | HEIGHT: 64 IN | SYSTOLIC BLOOD PRESSURE: 120 MMHG | BODY MASS INDEX: 26.63 KG/M2

## 2025-05-08 DIAGNOSIS — R06.09 OTHER FORMS OF DYSPNEA: ICD-10-CM

## 2025-05-08 DIAGNOSIS — I44.0 ATRIOVENTRICULAR BLOCK, FIRST DEGREE: ICD-10-CM

## 2025-05-08 DIAGNOSIS — I44.2 ATRIOVENTRICULAR BLOCK, COMPLETE: ICD-10-CM

## 2025-05-08 DIAGNOSIS — R42 DIZZINESS AND GIDDINESS: ICD-10-CM

## 2025-05-08 DIAGNOSIS — R06.02 SHORTNESS OF BREATH: ICD-10-CM

## 2025-05-08 DIAGNOSIS — Z45.018 ENCOUNTER FOR ADJUSTMENT AND MANAGEMENT OF OTHER PART OF CARDIAC PACEMAKER: ICD-10-CM

## 2025-05-08 PROCEDURE — 99214 OFFICE O/P EST MOD 30 MIN: CPT | Mod: 25

## 2025-05-08 PROCEDURE — 93280 PM DEVICE PROGR EVAL DUAL: CPT

## 2025-05-08 RX ORDER — AZELASTINE HYDROCHLORIDE 137 UG/1
0.1 SPRAY, METERED NASAL
Refills: 0 | Status: ACTIVE | COMMUNITY

## 2025-05-08 RX ORDER — IPRATROPIUM BROMIDE 42 UG/1
0.06 SPRAY, METERED NASAL
Refills: 0 | Status: ACTIVE | COMMUNITY

## 2025-05-08 RX ORDER — MULTIVIT WITH MIN/MFOLATE/K2 340-15/3 G
POWDER (GRAM) ORAL
Refills: 0 | Status: ACTIVE | COMMUNITY

## 2025-05-08 RX ORDER — EMPAGLIFLOZIN 10 MG/1
10 TABLET, FILM COATED ORAL DAILY
Refills: 0 | Status: ACTIVE | COMMUNITY

## 2025-05-08 RX ORDER — GLIPIZIDE 10 MG/1
10 TABLET ORAL TWICE DAILY
Refills: 0 | Status: ACTIVE | COMMUNITY

## 2025-08-07 ENCOUNTER — APPOINTMENT (OUTPATIENT)
Dept: CARDIOLOGY | Facility: CLINIC | Age: 80
End: 2025-08-07

## 2025-08-07 ENCOUNTER — NON-APPOINTMENT (OUTPATIENT)
Age: 80
End: 2025-08-07

## 2025-08-07 PROCEDURE — 93296 REM INTERROG EVL PM/IDS: CPT

## 2025-08-07 PROCEDURE — 93294 REM INTERROG EVL PM/LDLS PM: CPT

## 2025-08-11 ENCOUNTER — APPOINTMENT (OUTPATIENT)
Dept: OTOLARYNGOLOGY | Facility: CLINIC | Age: 80
End: 2025-08-11
Payer: MEDICARE

## 2025-08-11 DIAGNOSIS — R42 DIZZINESS AND GIDDINESS: ICD-10-CM

## 2025-08-11 DIAGNOSIS — J30.0 VASOMOTOR RHINITIS: ICD-10-CM

## 2025-08-11 PROCEDURE — 31231 NASAL ENDOSCOPY DX: CPT

## 2025-08-11 PROCEDURE — 99214 OFFICE O/P EST MOD 30 MIN: CPT | Mod: 25

## 2025-08-11 RX ORDER — STANDARDIZED SENNA CONCENTRATE 17.2 MG/1
17.2 TABLET ORAL
Refills: 0 | Status: ACTIVE | COMMUNITY

## 2025-08-11 RX ORDER — TIRZEPATIDE 2.5 MG/.5ML
2.5 INJECTION, SOLUTION SUBCUTANEOUS
Refills: 0 | Status: ACTIVE | COMMUNITY

## 2025-08-11 RX ORDER — EMPAGLIFLOZIN 25 MG/1
25 TABLET, FILM COATED ORAL
Refills: 0 | Status: ACTIVE | COMMUNITY

## 2025-08-25 ENCOUNTER — EMERGENCY (EMERGENCY)
Facility: HOSPITAL | Age: 80
LOS: 0 days | Discharge: HOME | End: 2025-08-25
Attending: EMERGENCY MEDICINE
Payer: MEDICARE

## 2025-08-25 VITALS
WEIGHT: 149.91 LBS | TEMPERATURE: 98 F | DIASTOLIC BLOOD PRESSURE: 70 MMHG | RESPIRATION RATE: 18 BRPM | HEART RATE: 71 BPM | OXYGEN SATURATION: 100 % | SYSTOLIC BLOOD PRESSURE: 167 MMHG | HEIGHT: 62 IN

## 2025-08-25 DIAGNOSIS — S71.112A LACERATION WITHOUT FOREIGN BODY, LEFT THIGH, INITIAL ENCOUNTER: ICD-10-CM

## 2025-08-25 DIAGNOSIS — Z98.890 OTHER SPECIFIED POSTPROCEDURAL STATES: Chronic | ICD-10-CM

## 2025-08-25 DIAGNOSIS — Z95.0 PRESENCE OF CARDIAC PACEMAKER: Chronic | ICD-10-CM

## 2025-08-25 DIAGNOSIS — Z23 ENCOUNTER FOR IMMUNIZATION: ICD-10-CM

## 2025-08-25 DIAGNOSIS — Z91.041 RADIOGRAPHIC DYE ALLERGY STATUS: ICD-10-CM

## 2025-08-25 DIAGNOSIS — Z95.0 PRESENCE OF CARDIAC PACEMAKER: ICD-10-CM
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
## 2025-08-25 PROCEDURE — 82962 GLUCOSE BLOOD TEST: CPT

## 2025-08-25 PROCEDURE — 12001 RPR S/N/AX/GEN/TRNK 2.5CM/<: CPT

## 2025-08-25 PROCEDURE — 90715 TDAP VACCINE 7 YRS/> IM: CPT

## 2025-08-25 PROCEDURE — 99283 EMERGENCY DEPT VISIT LOW MDM: CPT | Mod: 25

## 2025-08-25 PROCEDURE — 90471 IMMUNIZATION ADMIN: CPT

## 2025-08-25 PROCEDURE — 99284 EMERGENCY DEPT VISIT MOD MDM: CPT | Mod: 25

## 2025-09-17 ENCOUNTER — APPOINTMENT (OUTPATIENT)
Dept: OTOLARYNGOLOGY | Facility: CLINIC | Age: 80
End: 2025-09-17
Payer: MEDICARE

## 2025-09-17 PROCEDURE — 92537 CALORIC VSTBLR TEST W/REC: CPT

## 2025-09-17 PROCEDURE — 92557 COMPREHENSIVE HEARING TEST: CPT

## 2025-09-17 PROCEDURE — 92540 BASIC VESTIBULAR EVALUATION: CPT

## 2025-09-17 PROCEDURE — 92550 TYMPANOMETRY & REFLEX THRESH: CPT | Mod: 52

## 2025-09-17 PROCEDURE — 92547 SUPPLEMENTAL ELECTRICAL TEST: CPT

## 2025-09-22 PROBLEM — H90.5 SNHL (SENSORINEURAL HEARING LOSS): Status: ACTIVE | Noted: 2025-09-22

## 2025-09-22 PROBLEM — H73.899 RETRACTED EAR DRUM: Status: ACTIVE | Noted: 2025-09-22
